# Patient Record
Sex: FEMALE | Race: WHITE | NOT HISPANIC OR LATINO
[De-identification: names, ages, dates, MRNs, and addresses within clinical notes are randomized per-mention and may not be internally consistent; named-entity substitution may affect disease eponyms.]

---

## 2017-04-25 ENCOUNTER — RECORDS - HEALTHEAST (OUTPATIENT)
Dept: ADMINISTRATIVE | Facility: OTHER | Age: 22
End: 2017-04-25

## 2017-05-11 ENCOUNTER — HOSPITAL ENCOUNTER (EMERGENCY)
Facility: CLINIC | Age: 22
Discharge: HOME | End: 2017-05-11
Attending: INTERNAL MEDICINE | Admitting: INTERNAL MEDICINE
Payer: COMMERCIAL

## 2017-05-11 VITALS
SYSTOLIC BLOOD PRESSURE: 104 MMHG | OXYGEN SATURATION: 99 % | TEMPERATURE: 98.3 F | RESPIRATION RATE: 16 BRPM | WEIGHT: 160 LBS | DIASTOLIC BLOOD PRESSURE: 64 MMHG | HEART RATE: 77 BPM

## 2017-05-11 DIAGNOSIS — O21.0 HYPEREMESIS GRAVIDARUM: ICD-10-CM

## 2017-05-11 LAB
ALBUMIN SERPL-MCNC: 3.5 G/DL (ref 3.4–5)
ALBUMIN UR-MCNC: NEGATIVE MG/DL
ALP SERPL-CCNC: 42 U/L (ref 40–150)
ALT SERPL W P-5'-P-CCNC: 18 U/L (ref 0–50)
ANION GAP SERPL CALCULATED.3IONS-SCNC: 7 MMOL/L (ref 3–14)
APPEARANCE UR: ABNORMAL
AST SERPL W P-5'-P-CCNC: 12 U/L (ref 0–45)
BACTERIA #/AREA URNS HPF: ABNORMAL /HPF
BASOPHILS # BLD AUTO: 0 10E9/L (ref 0–0.2)
BASOPHILS NFR BLD AUTO: 0.2 %
BILIRUB SERPL-MCNC: 0.4 MG/DL (ref 0.2–1.3)
BILIRUB UR QL STRIP: NEGATIVE
BUN SERPL-MCNC: 9 MG/DL (ref 7–30)
CALCIUM SERPL-MCNC: 9 MG/DL (ref 8.5–10.1)
CHLORIDE SERPL-SCNC: 105 MMOL/L (ref 94–109)
CO2 SERPL-SCNC: 25 MMOL/L (ref 20–32)
COLOR UR AUTO: YELLOW
CREAT SERPL-MCNC: 0.49 MG/DL (ref 0.52–1.04)
DIFFERENTIAL METHOD BLD: NORMAL
EOSINOPHIL # BLD AUTO: 0.1 10E9/L (ref 0–0.7)
EOSINOPHIL NFR BLD AUTO: 0.7 %
ERYTHROCYTE [DISTWIDTH] IN BLOOD BY AUTOMATED COUNT: 13 % (ref 10–15)
GFR SERPL CREATININE-BSD FRML MDRD: ABNORMAL ML/MIN/1.7M2
GLUCOSE SERPL-MCNC: 75 MG/DL (ref 70–99)
GLUCOSE UR STRIP-MCNC: NEGATIVE MG/DL
HCT VFR BLD AUTO: 37.9 % (ref 35–47)
HGB BLD-MCNC: 13.4 G/DL (ref 11.7–15.7)
HGB UR QL STRIP: NEGATIVE
HYALINE CASTS #/AREA URNS LPF: 1 /LPF (ref 0–2)
IMM GRANULOCYTES # BLD: 0 10E9/L (ref 0–0.4)
IMM GRANULOCYTES NFR BLD: 0.3 %
KETONES UR STRIP-MCNC: 20 MG/DL
LEUKOCYTE ESTERASE UR QL STRIP: NEGATIVE
LYMPHOCYTES # BLD AUTO: 1.5 10E9/L (ref 0.8–5.3)
LYMPHOCYTES NFR BLD AUTO: 16.5 %
MCH RBC QN AUTO: 32 PG (ref 26.5–33)
MCHC RBC AUTO-ENTMCNC: 35.4 G/DL (ref 31.5–36.5)
MCV RBC AUTO: 91 FL (ref 78–100)
MONOCYTES # BLD AUTO: 0.5 10E9/L (ref 0–1.3)
MONOCYTES NFR BLD AUTO: 5.2 %
MUCOUS THREADS #/AREA URNS LPF: PRESENT /LPF
NEUTROPHILS # BLD AUTO: 7 10E9/L (ref 1.6–8.3)
NEUTROPHILS NFR BLD AUTO: 77.1 %
NITRATE UR QL: NEGATIVE
NRBC # BLD AUTO: 0 10*3/UL
NRBC BLD AUTO-RTO: 0 /100
PH UR STRIP: 6 PH (ref 5–7)
PLATELET # BLD AUTO: 287 10E9/L (ref 150–450)
POTASSIUM SERPL-SCNC: 3.7 MMOL/L (ref 3.4–5.3)
PROT SERPL-MCNC: 6.9 G/DL (ref 6.8–8.8)
RBC # BLD AUTO: 4.19 10E12/L (ref 3.8–5.2)
RBC #/AREA URNS AUTO: 1 /HPF (ref 0–2)
SODIUM SERPL-SCNC: 137 MMOL/L (ref 133–144)
SP GR UR STRIP: 1.02 (ref 1–1.03)
SQUAMOUS #/AREA URNS AUTO: 1 /HPF (ref 0–1)
URN SPEC COLLECT METH UR: ABNORMAL
UROBILINOGEN UR STRIP-MCNC: 0 MG/DL (ref 0–2)
WBC # BLD AUTO: 9.1 10E9/L (ref 4–11)
WBC #/AREA URNS AUTO: 1 /HPF (ref 0–2)

## 2017-05-11 PROCEDURE — 81001 URINALYSIS AUTO W/SCOPE: CPT | Performed by: INTERNAL MEDICINE

## 2017-05-11 PROCEDURE — 85025 COMPLETE CBC W/AUTO DIFF WBC: CPT | Performed by: INTERNAL MEDICINE

## 2017-05-11 PROCEDURE — 99284 EMERGENCY DEPT VISIT MOD MDM: CPT | Mod: 25

## 2017-05-11 PROCEDURE — 80053 COMPREHEN METABOLIC PANEL: CPT | Performed by: INTERNAL MEDICINE

## 2017-05-11 PROCEDURE — 96361 HYDRATE IV INFUSION ADD-ON: CPT

## 2017-05-11 PROCEDURE — 25000128 H RX IP 250 OP 636: Performed by: INTERNAL MEDICINE

## 2017-05-11 PROCEDURE — 96374 THER/PROPH/DIAG INJ IV PUSH: CPT

## 2017-05-11 RX ORDER — PRENATAL VIT/IRON FUM/FOLIC AC 27MG-0.8MG
1 TABLET ORAL DAILY
COMMUNITY

## 2017-05-11 RX ORDER — METOCLOPRAMIDE HYDROCHLORIDE 5 MG/ML
5 INJECTION INTRAMUSCULAR; INTRAVENOUS ONCE
Status: COMPLETED | OUTPATIENT
Start: 2017-05-11 | End: 2017-05-11

## 2017-05-11 RX ORDER — SODIUM CHLORIDE 9 MG/ML
1000 INJECTION, SOLUTION INTRAVENOUS CONTINUOUS
Status: DISCONTINUED | OUTPATIENT
Start: 2017-05-11 | End: 2017-05-11 | Stop reason: HOSPADM

## 2017-05-11 RX ORDER — ONDANSETRON 4 MG/1
4 TABLET, ORALLY DISINTEGRATING ORAL EVERY 8 HOURS PRN
Qty: 10 TABLET | Refills: 0 | Status: SHIPPED | OUTPATIENT
Start: 2017-05-11 | End: 2017-05-14

## 2017-05-11 RX ADMIN — SODIUM CHLORIDE 1000 ML: 9 INJECTION, SOLUTION INTRAVENOUS at 08:39

## 2017-05-11 RX ADMIN — METOCLOPRAMIDE 5 MG: 5 INJECTION, SOLUTION INTRAMUSCULAR; INTRAVENOUS at 08:39

## 2017-05-11 ASSESSMENT — ENCOUNTER SYMPTOMS
NAUSEA: 1
DYSURIA: 0
CHILLS: 0
DIFFICULTY URINATING: 0
FEVER: 0
ABDOMINAL PAIN: 0
VOMITING: 1
DIARRHEA: 0

## 2017-05-11 NOTE — ED NOTES
Pt is 11 weeks pregnant and c/o nausea and vomiting for 24 hours. Pt unable to keep anything down. Does have reglan at home and has not taking it for a couple days.

## 2017-05-11 NOTE — DISCHARGE INSTRUCTIONS
Discharge Instructions  Hyperemesis Gravidarum    You were seen today for hyperemesis gravidarum. It is very common to have some nausea and vomiting in early pregnancy (sometimes called  morning sickness,  although it happens at all times of day.) In hyperemesis gravidarum, however, the vomiting is much more severe, so you may become dehydrated and lose weight. We have treated you today and improved your symptoms, but they usually come back. You need to follow-up with your regular doctor or obstetrician within 1-2 days.    Return to the Emergency Department if:    You feel dizzy or light headed when standing up.    You are vomiting and can t keep fluids or medications down.    You urinate less often than usual and have dark yellow urine.     You feel much more ill or develop new symptoms.    What can I do to help myself?    Be sure to drink plenty of cold clear fluids (sports drinks and ginger ale) and suck on popsicles between meals.    Eat as soon as you feel hungry, or even before you feel hungry. Try to keep something on your stomach.     Avoid strong smells, or other things that make you feel nauseated.    Snack often and eat small meals high in protein or carbohydrates such as crackers, bread, nuts, and low-fat yogurt. Avoid spicy, greasy, or acid foods.    Avoid lying down right after you eat.    Take your prenatal vitamins at bedtime with a snack instead of taking them in the morning.    Ginger may make you feel better. Try eating a small piece of ginger, or having ginger-flavored hard candies.    Acupressure wrist bands are also helpful to some people.    Treatment:    Nausea medication.  Your doctor may send you home with a prescription medicine, like Zofran  (ondansetron), Compazine  (prochlorperazine), or Reglan  (metoclopramide).       Your doctor may recommend that you take non-prescription nausea medicines, like Dramamine  (dimenhydrinate).      Vitamins. Make sure your prenatal vitamins have 400  micrograms of Folic acid and have vitamin B6, since this may help your nausea and vomiting.   If you were given a prescription for medicine here today, be sure to read all of the information (including the package insert) that comes with your prescription.  This will include important information about the medicine, its side effects, and any warnings that you need to know about.  The pharmacist who fills the prescription can provide more information and answer questions you may have about the medicine.  If you have questions or concerns that the pharmacist cannot address, please call or return to the Emergency Department.           Remember that you can always come back to the Emergency Department if you are not able to see your regular doctor in the amount of time listed above, if you get any new symptoms, or if there is anything that worries you.

## 2017-05-11 NOTE — ED PROVIDER NOTES
History     Chief Complaint:  Nausea and vomiting    HPI   Tami Malik is a 22 year old female who presents with nausea, vomiting. The patient is currently 11 weeks pregnant by date, , and has had some nausea and vomiting associated early with her pregnancy and has Reglan at home. Over the past two days, she reports that she has had persistent nausea with vomiting, totaling about 11 bouts since onset, and has been unable to tolerate much PO intake. Given her pregnant status and uncontrolled symptoms she presents here now for evaluation. She denies any recent sick contacts, strange/uncooked food. She has not had any fevers, diarrhea, significant or notable abdominal pain, or urinary symptoms.    Allergies:  No known drug allergies     Medications:    Reglan  Prenatal vitamins     Past Medical History:    The patient does not have any past pertinent medical history.     Past Surgical History:    ENT surgery     Family History:    History reviewed. No pertinent family history.      Social History:  Smoking status: Never smoker  Alcohol use: No   Marital Status:       Review of Systems   Constitutional: Negative for chills and fever.   Gastrointestinal: Positive for nausea and vomiting. Negative for abdominal pain and diarrhea.   Genitourinary: Positive for decreased urine volume. Negative for difficulty urinating, dysuria, vaginal bleeding and vaginal pain.   All other systems reviewed and are negative.      Physical Exam   First Vitals:  BP: 123/75  Pulse: 77  Temp: 98.3  F (36.8  C)  Resp: 16  Weight: 72.6 kg (160 lb)  SpO2: 98 %      Physical Exam   Constitutional: She is cooperative.   HENT:   Right Ear: Tympanic membrane normal.   Left Ear: Tympanic membrane normal.   Mouth/Throat: Oropharynx is clear and moist and mucous membranes are normal.   Eyes: Conjunctivae are normal.   Neck: Normal range of motion.   Cardiovascular: Regular rhythm and normal heart sounds.    Pulmonary/Chest: Effort normal  and breath sounds normal.   Abdominal: Soft. Normal appearance and bowel sounds are normal. There is tenderness. There is no rebound and no guarding.   Mild lower abd tenderness   Musculoskeletal: Normal range of motion.   Lymphadenopathy:     She has no cervical adenopathy.   Neurological: She is alert.   Skin: Skin is warm and dry.   Psychiatric: She has a normal mood and affect.       Emergency Department Course     Laboratory:  CBC: WNL (WBC 9.1, HGB 13.4, )    CMP: Creatinine 0.49 (L), o/w WNL   UA: Ketone 20, few bacteria, mucous present, o/w negative    Interventions:  0839 - Reglan 5 mg IV  0839 - NS 1L IV Bolus      Emergency Department Course:  Past medical records, nursing notes, and vitals reviewed.  0820: I performed an exam of the patient and obtained history, as documented above.   An IV was inserted to administer the above interventions.    1105: I rechecked the patient. Findings and plan explained to the Patient. Patient discharged home with instructions regarding supportive care, medications, and reasons to return. The importance of close follow-up was reviewed.      Impression & Plan      Medical Decision Making:  Tami Malik is a 22 year old female about 11 weeks pregnant who presents complaining of intolerable vomiting. She has been struggling with nausea but has been generally been able to control it with Reglan but today was unable to keep her medicine down. She did not have any significant tenderness in the abdomen but given her progressive symptoms this late in pregnancy, I did order laboratory tests and these are reassuring. She has small ketonuria but no evidence of urinary tract infection. After IV fluids and IV Reglan she has been able to take clear liquids without difficulty. I will discharge the patient with Zofran ODT to use as a second line agent in case she is unable to keep Reglan down. Return if again unable to tolerate oral fluids or other problems.    Diagnosis:     ICD-10-CM    1. Hyperemesis gravidarum O21.0        Discharge Medications:   Details   ondansetron (ZOFRAN ODT) 4 MG ODT tab Take 1 tablet (4 mg) by mouth every 8 hours as needed, Disp-10 tablet, R-0, Local Print          Jarad Moreira  5/11/2017   [facility] EMERGENCY DEPARTMENT  I, Jarad Moreira, lisa serving as a scribe at 8:20 AM on 5/11/2017 to document services personally performed by Luna House MD based on my observations and the provider's statements to me.       Luna House MD  05/11/17 2471

## 2017-05-11 NOTE — ED AVS SNAPSHOT
[facility] Emergency Department    [address]    [facility] [address]    Phone:  [phone]    Fax:  [phone]                                       Tami Malik   MRN: [id number]    Department:  [facility] Emergency Department   Date of Visit:  5/11/2017           After Visit Summary Signature Page     I have received my discharge instructions, and my questions have been answered. I have discussed any challenges I see with this plan with the nurse or doctor.    ..........................................................................................................................................  Patient/Patient Representative Signature      ..........................................................................................................................................  Patient Representative Print Name and Relationship to Patient    ..................................................               ................................................  Date                                            Time    ..........................................................................................................................................  Reviewed by Signature/Title    ...................................................              ..............................................  Date                                                            Time

## 2017-05-11 NOTE — ED AVS SNAPSHOT
[facility] Emergency Department    [address] [place] [address]    Phone:  [phone]    Fax:  [phone]                                       Tami Malik   MRN: [id number]    Department:  [facility] Emergency Department   Date of Visit:  5/11/2017           Patient Information     Date Of Birth          1995        Your diagnoses for this visit were:     Hyperemesis gravidarum        You were seen by Luna House MD.        Discharge Instructions       Discharge Instructions  Hyperemesis Gravidarum    You were seen today for hyperemesis gravidarum. It is very common to have some nausea and vomiting in early pregnancy (sometimes called  morning sickness,  although it happens at all times of day.) In hyperemesis gravidarum, however, the vomiting is much more severe, so you may become dehydrated and lose weight. We have treated you today and improved your symptoms, but they usually come back. You need to follow-up with your regular doctor or obstetrician within 1-2 days.    Return to the Emergency Department if:    You feel dizzy or light headed when standing up.    You are vomiting and can t keep fluids or medications down.    You urinate less often than usual and have dark yellow urine.     You feel much more ill or develop new symptoms.    What can I do to help myself?    Be sure to drink plenty of cold clear fluids (sports drinks and ginger ale) and suck on popsicles between meals.    Eat as soon as you feel hungry, or even before you feel hungry. Try to keep something on your stomach.     Avoid strong smells, or other things that make you feel nauseated.    Snack often and eat small meals high in protein or carbohydrates such as crackers, bread, nuts, and low-fat yogurt. Avoid spicy, greasy, or acid foods.    Avoid lying down right after you eat.    Take your prenatal vitamins at bedtime with a snack instead of taking them in the  morning.    Ginger may make you feel better. Try eating a small piece of ginger, or having ginger-flavored hard candies.    Acupressure wrist bands are also helpful to some people.    Treatment:    Nausea medication.  Your doctor may send you home with a prescription medicine, like Zofran  (ondansetron), Compazine  (prochlorperazine), or Reglan  (metoclopramide).       Your doctor may recommend that you take non-prescription nausea medicines, like Dramamine  (dimenhydrinate).      Vitamins. Make sure your prenatal vitamins have 400 micrograms of Folic acid and have vitamin B6, since this may help your nausea and vomiting.   If you were given a prescription for medicine here today, be sure to read all of the information (including the package insert) that comes with your prescription.  This will include important information about the medicine, its side effects, and any warnings that you need to know about.  The pharmacist who fills the prescription can provide more information and answer questions you may have about the medicine.  If you have questions or concerns that the pharmacist cannot address, please call or return to the Emergency Department.           Remember that you can always come back to the Emergency Department if you are not able to see your regular doctor in the amount of time listed above, if you get any new symptoms, or if there is anything that worries you.        24 Hour Appointment Hotline       To make an appointment at any [facility], call [phone] ([phone]). If you don't have a family doctor or clinic, we will help you find one. [facility] are conveniently located to serve the needs of you and your family.             Review of your medicines      START taking        Dose / Directions Last dose taken    ondansetron 4 MG ODT tab   Commonly known as:  ZOFRAN ODT   Dose:  4 mg   Quantity:  10 tablet        Take 1 tablet (4 mg) by mouth every 8 hours as needed   Refills:   0          Our records show that you are taking the medicines listed below. If these are incorrect, please call your family doctor or clinic.        Dose / Directions Last dose taken    prenatal multivitamin  plus iron 27-0.8 MG Tabs per tablet   Dose:  1 tablet        Take 1 tablet by mouth daily   Refills:  0        REGLAN PO        Refills:  0                Prescriptions were sent or printed at these locations (1 Prescription)                   Other Prescriptions                Printed at Department/Unit printer (1 of 1)         ondansetron (ZOFRAN ODT) 4 MG ODT tab                Procedures and tests performed during your visit     CBC with platelets differential    Comprehensive metabolic panel    UA with Microscopic      Orders Needing Specimen Collection     None      Pending Results     No orders found from 5/9/2017 to 5/12/2017.            Pending Culture Results     No orders found from 5/9/2017 to 5/12/2017.            Pending Results Instructions     If you had any lab results that were not finalized at the time of your Discharge, you can call the ED Lab Result RN at [phone]. You will be contacted by this team for any positive Lab results or changes in treatment. The nurses are available 7 days a week from 10A to 6:30P.  You can leave a message 24 hours per day and they will return your call.        Test Results From Your Hospital Stay        5/11/2017  8:51 AM      Component Results     Component Value Ref Range & Units Status    WBC 9.1 4.0 - 11.0 10e9/L Final    RBC Count 4.19 3.8 - 5.2 10e12/L Final    Hemoglobin 13.4 11.7 - 15.7 g/dL Final    Hematocrit 37.9 35.0 - 47.0 % Final    MCV 91 78 - 100 fl Final    MCH 32.0 26.5 - 33.0 pg Final    MCHC 35.4 31.5 - 36.5 g/dL Final    RDW 13.0 10.0 - 15.0 % Final    Platelet Count 287 150 - 450 10e9/L Final    Diff Method Automated Method  Final    % Neutrophils 77.1 % Final    % Lymphocytes 16.5 % Final    % Monocytes 5.2 % Final    % Eosinophils 0.7  % Final    % Basophils 0.2 % Final    % Immature Granulocytes 0.3 % Final    Nucleated RBCs 0 0 /100 Final    Absolute Neutrophil 7.0 1.6 - 8.3 10e9/L Final    Absolute Lymphocytes 1.5 0.8 - 5.3 10e9/L Final    Absolute Monocytes 0.5 0.0 - 1.3 10e9/L Final    Absolute Eosinophils 0.1 0.0 - 0.7 10e9/L Final    Absolute Basophils 0.0 0.0 - 0.2 10e9/L Final    Abs Immature Granulocytes 0.0 0 - 0.4 10e9/L Final    Absolute Nucleated RBC 0.0  Final         5/11/2017  9:08 AM      Component Results     Component Value Ref Range & Units Status    Sodium 137 133 - 144 mmol/L Final    Potassium 3.7 3.4 - 5.3 mmol/L Final    Chloride 105 94 - 109 mmol/L Final    Carbon Dioxide 25 20 - 32 mmol/L Final    Anion Gap 7 3 - 14 mmol/L Final    Glucose 75 70 - 99 mg/dL Final    Urea Nitrogen 9 7 - 30 mg/dL Final    Creatinine 0.49 (L) 0.52 - 1.04 mg/dL Final    GFR Estimate >90  Non  GFR Calc   >60 mL/min/1.7m2 Final    GFR Estimate If Black >90   GFR Calc   >60 mL/min/1.7m2 Final    Calcium 9.0 8.5 - 10.1 mg/dL Final    Bilirubin Total 0.4 0.2 - 1.3 mg/dL Final    Albumin 3.5 3.4 - 5.0 g/dL Final    Protein Total 6.9 6.8 - 8.8 g/dL Final    Alkaline Phosphatase 42 40 - 150 U/L Final    ALT 18 0 - 50 U/L Final    AST 12 0 - 45 U/L Final         5/11/2017 10:37 AM      Component Results     Component Value Ref Range & Units Status    Color Urine Yellow  Final    Appearance Urine Slightly Cloudy  Final    Glucose Urine Negative NEG mg/dL Final    Bilirubin Urine Negative NEG Final    Ketones Urine 20 (A) NEG mg/dL Final    Specific Gravity Urine 1.019 1.003 - 1.035 Final    Blood Urine Negative NEG Final    pH Urine 6.0 5.0 - 7.0 pH Final    Protein Albumin Urine Negative NEG mg/dL Final    Urobilinogen mg/dL 0.0 0.0 - 2.0 mg/dL Final    Nitrite Urine Negative NEG Final    Leukocyte Esterase Urine Negative NEG Final    Source Midstream Urine  Final    WBC Urine 1 0 - 2 /HPF Final    RBC Urine 1 0 - 2  /HPF Final    Bacteria Urine Few (A) NEG /HPF Final    Squamous Epithelial /HPF Urine 1 0 - 1 /HPF Final    Mucous Urine Present (A) NEG /LPF Final    Hyaline Casts 1 0 - 2 /LPF Final                Clinical Quality Measure: Blood Pressure Screening     Your blood pressure was checked while you were in the emergency department today. The last reading we obtained was  BP: 104/64 . Please read the guidelines below about what these numbers mean and what you should do about them.  If your systolic blood pressure (the top number) is less than 120 and your diastolic blood pressure (the bottom number) is less than 80, then your blood pressure is normal. There is nothing more that you need to do about it.  If your systolic blood pressure (the top number) is 120-139 or your diastolic blood pressure (the bottom number) is 80-89, your blood pressure may be higher than it should be. You should have your blood pressure rechecked within a year by a primary care provider.  If your systolic blood pressure (the top number) is 140 or greater or your diastolic blood pressure (the bottom number) is 90 or greater, you may have high blood pressure. High blood pressure is treatable, but if left untreated over time it can put you at risk for heart attack, stroke, or kidney failure. You should have your blood pressure rechecked by a primary care provider within the next 4 weeks.  If your provider in the emergency department today gave you specific instructions to follow-up with your doctor or provider even sooner than that, you should follow that instruction and not wait for up to 4 weeks for your follow-up visit.        Thank you for choosing [facility]       Thank you for choosing [facility] for your care. Our goal is always to provide you with excellent care. Hearing back from our patients is one way we can continue to improve our services. Please take a few minutes to complete the written survey that you may receive in the mail after you  "visit with us. Thank you!        [employer] Information     [employer] lets you send messages to your doctor, view your test results, renew your prescriptions, schedule appointments and more. To sign up, go to [email or web address] . Click on \"Log in\" on the left side of the screen, which will take you to the Welcome page. Then click on \"Sign up Now\" on the right side of the page.     You will be asked to enter the access code listed below, as well as some personal information. Please follow the directions to create your username and password.     Your access code is: [email or web address]  Expires: 2017  8:57 AM     Your access code will  in 90 days. If you need help or a new code, please call your [facility] clinic or [phone].        Care EveryWhere ID     This is your Care EveryWhere ID. This could be used by other organizations to access your [facility] medical records  [id number]        After Visit Summary       This is your record. Keep this with you and show to your community pharmacist(s) and doctor(s) at your next visit.                  "

## 2017-05-17 ENCOUNTER — HOSPITAL ENCOUNTER (EMERGENCY)
Facility: CLINIC | Age: 22
Discharge: HOME | End: 2017-05-17
Attending: INTERNAL MEDICINE | Admitting: INTERNAL MEDICINE
Payer: COMMERCIAL

## 2017-05-17 VITALS
SYSTOLIC BLOOD PRESSURE: 131 MMHG | WEIGHT: 150 LBS | TEMPERATURE: 98 F | OXYGEN SATURATION: 99 % | RESPIRATION RATE: 18 BRPM | DIASTOLIC BLOOD PRESSURE: 88 MMHG | HEART RATE: 69 BPM

## 2017-05-17 DIAGNOSIS — K59.00 CONSTIPATION, UNSPECIFIED CONSTIPATION TYPE: ICD-10-CM

## 2017-05-17 PROCEDURE — 99282 EMERGENCY DEPT VISIT SF MDM: CPT

## 2017-05-17 ASSESSMENT — ENCOUNTER SYMPTOMS
ABDOMINAL PAIN: 1
NAUSEA: 1
VOMITING: 1
FEVER: 0
CONSTIPATION: 1

## 2017-05-17 NOTE — ED AVS SNAPSHOT
[facility]    [address]    [facility] [address]    Phone:  [phone]    Fax:  [phone]                                       Tami Malik   MRN: [id number]    Department:  [facility] Emergency [facility]   Date of Visit:  5/17/2017           Patient Information     Date Of Birth          1995        Your diagnoses for this visit were:     Constipation, unspecified constipation type        You were seen by Luna House MD.        Discharge Instructions       Discharge Instructions  Constipation  Your doctor has diagnosed you with constipation. Constipation can cause severe cramping pain and your physician thinks this is the cause of your abdominal pain today.  People usually recognize that they are constipated because they have difficulty having bowel movements, are not having bowel movements frequently enough, or are not having large enough bowel movements. Sometimes, especially in children or older people, you do not recognize that you are constipated until it becomes severe. The most common cause of constipation is a combination of lack of exercise and not eating enough fruits, vegetables and whole grains. Constipation can also be a side effect of medications, such as narcotics, or may be caused by a disease of the digestive system.    Return to the Emergency Department if:    Your abdominal pain worsens or does not improve after a bowel movement.    You become very weak.    You get an oral temperature above 102oF or as directed by your doctor.    You have blood in your stools (bright red or black, tarry stools).    You keep throwing up or can t drink liquids.    Your see blood when you throw up.    Your stomach gets bloated or bigger.    You have new symptoms or anything that worries you.    What can I do to help myself?    If your doctor gave you a cathartic medication, like magnesium citrate or GoLytely  (polyethylene glycol),  you can expect to have cramps and gas pains after taking it. You can expect to have a number of bowel movements and even diarrhea in the course of clearing your bowels.  You will know your bowels have been cleaned out after you pass clear liquid. The cramps and gas should let up after you have emptied your bowels. You may want to wait until morning to take this type of medication so you aren t up in the night.     Sometimes instead of cathartics, we recommend laxatives like milk of magnesia to move your bowels more slowly, or an enema to help the bowels to move. Read and follow the package directions, or follow your physician s instructions.    Once you have become very constipated, it takes time for your bowels to return to normal and you need to be very careful to prevent becoming constipated again. Take a laxative if you don t move your bowels at least every two days.       Eat foods that have a lot of fiber. Good choices are fruits, vegetables, prune juice, apple juice and high fiber cereal. Limit dairy products such as milk and cheese, since these can make constipation worse.     Drink plenty of water and other fluids.     When you feel the need to go to the bathroom, go to the bathroom. Don t hold it.    Miralax , Metamucil , Colace , Senna or fiber supplements can be used daily.  Miralax  daily is often the best choice for children.    FOLLOW UP WITH YOUR REGULAR DOCTOR IF YOUR CONSTIPATION IS NOT IMPROVING.  Sometimes, chronic constipation requires further testing to determine the cause. If you are over 50 years old, you may need a colonoscopy if you have not had one before.   If you were given a prescription for medicine here today, be sure to read all of the information (including the package insert) that comes with your prescription.  This will include important information about the medicine, its side effects, and any warnings that you need to know about.  The pharmacist who fills the prescription can  provide more information and answer questions you may have about the medicine.  If you have questions or concerns that the pharmacist cannot address, please call or return to the Emergency Department.     Remember that you can always come back to the Emergency Department if you are not able to see your regular doctor in the amount of time listed above, if you get any new symptoms, or if there is anything that worries you.      24 Hour Appointment Hotline       To make an appointment at any [facility], call [phone] ([phone]). If you don't have a family doctor or clinic, we will help you find one. [facility] are conveniently located to serve the needs of you and your family.             Review of your medicines      Our records show that you are taking the medicines listed below. If these are incorrect, please call your family doctor or clinic.        Dose / Directions Last dose taken    prenatal multivitamin  plus iron 27-0.8 MG Tabs per tablet   Dose:  1 tablet        Take 1 tablet by mouth daily   Refills:  0        REGLAN PO        Refills:  0                Orders Needing Specimen Collection     None      Pending Results     No orders found from 5/15/2017 to 5/18/2017.            Pending Culture Results     No orders found from 5/15/2017 to 5/18/2017.            Pending Results Instructions     If you had any lab results that were not finalized at the time of your Discharge, you can call the ED Lab Result RN at [phone]. You will be contacted by this team for any positive Lab results or changes in treatment. The nurses are available 7 days a week from 10A to 6:30P.  You can leave a message 24 hours per day and they will return your call.        Test Results From Your Hospital Stay               Clinical Quality Measure: Blood Pressure Screening     Your blood pressure was checked while you were in the emergency department today. The last reading we obtained was  BP: 131/88 . Please  "read the guidelines below about what these numbers mean and what you should do about them.  If your systolic blood pressure (the top number) is less than 120 and your diastolic blood pressure (the bottom number) is less than 80, then your blood pressure is normal. There is nothing more that you need to do about it.  If your systolic blood pressure (the top number) is 120-139 or your diastolic blood pressure (the bottom number) is 80-89, your blood pressure may be higher than it should be. You should have your blood pressure rechecked within a year by a primary care provider.  If your systolic blood pressure (the top number) is 140 or greater or your diastolic blood pressure (the bottom number) is 90 or greater, you may have high blood pressure. High blood pressure is treatable, but if left untreated over time it can put you at risk for heart attack, stroke, or kidney failure. You should have your blood pressure rechecked by a primary care provider within the next 4 weeks.  If your provider in the emergency department today gave you specific instructions to follow-up with your doctor or provider even sooner than that, you should follow that instruction and not wait for up to 4 weeks for your follow-up visit.        Thank you for choosing [facility]       Thank you for choosing [facility] for your care. Our goal is always to provide you with excellent care. Hearing back from our patients is one way we can continue to improve our services. Please take a few minutes to complete the written survey that you may receive in the mail after you visit with us. Thank you!        [employer] Information     [employer] lets you send messages to your doctor, view your test results, renew your prescriptions, schedule appointments and more. To sign up, go to [email or web address] . Click on \"Log in\" on the left side of the screen, which will take you to the Welcome page. Then click on \"Sign up Now\" on the right side of the page.     You " will be asked to enter the access code listed below, as well as some personal information. Please follow the directions to create your username and password.     Your access code is: [email or web address]  Expires: 2017  8:57 AM     Your access code will  in 90 days. If you need help or a new code, please call your [facility] clinic or [phone].        Care EveryWhere ID     This is your Care EveryWhere ID. This could be used by other organizations to access your [facility] medical records  [id number]        After Visit Summary       This is your record. Keep this with you and show to your community pharmacist(s) and doctor(s) at your next visit.

## 2017-05-17 NOTE — DISCHARGE INSTRUCTIONS
Discharge Instructions  Constipation  Your doctor has diagnosed you with constipation. Constipation can cause severe cramping pain and your physician thinks this is the cause of your abdominal pain today.  People usually recognize that they are constipated because they have difficulty having bowel movements, are not having bowel movements frequently enough, or are not having large enough bowel movements. Sometimes, especially in children or older people, you do not recognize that you are constipated until it becomes severe. The most common cause of constipation is a combination of lack of exercise and not eating enough fruits, vegetables and whole grains. Constipation can also be a side effect of medications, such as narcotics, or may be caused by a disease of the digestive system.    Return to the Emergency Department if:    Your abdominal pain worsens or does not improve after a bowel movement.    You become very weak.    You get an oral temperature above 102oF or as directed by your doctor.    You have blood in your stools (bright red or black, tarry stools).    You keep throwing up or can t drink liquids.    Your see blood when you throw up.    Your stomach gets bloated or bigger.    You have new symptoms or anything that worries you.    What can I do to help myself?    If your doctor gave you a cathartic medication, like magnesium citrate or GoLytely  (polyethylene glycol), you can expect to have cramps and gas pains after taking it. You can expect to have a number of bowel movements and even diarrhea in the course of clearing your bowels.  You will know your bowels have been cleaned out after you pass clear liquid. The cramps and gas should let up after you have emptied your bowels. You may want to wait until morning to take this type of medication so you aren t up in the night.     Sometimes instead of cathartics, we recommend laxatives like milk of magnesia to move your bowels more slowly, or an enema to  help the bowels to move. Read and follow the package directions, or follow your physician s instructions.    Once you have become very constipated, it takes time for your bowels to return to normal and you need to be very careful to prevent becoming constipated again. Take a laxative if you don t move your bowels at least every two days.       Eat foods that have a lot of fiber. Good choices are fruits, vegetables, prune juice, apple juice and high fiber cereal. Limit dairy products such as milk and cheese, since these can make constipation worse.     Drink plenty of water and other fluids.     When you feel the need to go to the bathroom, go to the bathroom. Don t hold it.    Miralax , Metamucil , Colace , Senna or fiber supplements can be used daily.  Miralax  daily is often the best choice for children.    FOLLOW UP WITH YOUR REGULAR DOCTOR IF YOUR CONSTIPATION IS NOT IMPROVING.  Sometimes, chronic constipation requires further testing to determine the cause. If you are over 50 years old, you may need a colonoscopy if you have not had one before.   If you were given a prescription for medicine here today, be sure to read all of the information (including the package insert) that comes with your prescription.  This will include important information about the medicine, its side effects, and any warnings that you need to know about.  The pharmacist who fills the prescription can provide more information and answer questions you may have about the medicine.  If you have questions or concerns that the pharmacist cannot address, please call or return to the Emergency Department.     Remember that you can always come back to the Emergency Department if you are not able to see your regular doctor in the amount of time listed above, if you get any new symptoms, or if there is anything that worries you.

## 2017-05-17 NOTE — ED AVS SNAPSHOT
[facility] Emergency Department    [address]    [facility] [address]    Phone:  [phone]    Fax:  [phone]                                       Tami Malik   MRN: [id number]    Department:  [facility] Emergency Department   Date of Visit:  5/17/2017           After Visit Summary Signature Page     I have received my discharge instructions, and my questions have been answered. I have discussed any challenges I see with this plan with the nurse or doctor.    ..........................................................................................................................................  Patient/Patient Representative Signature      ..........................................................................................................................................  Patient Representative Print Name and Relationship to Patient    ..................................................               ................................................  Date                                            Time    ..........................................................................................................................................  Reviewed by Signature/Title    ...................................................              ..............................................  Date                                                            Time

## 2017-05-17 NOTE — ED PROVIDER NOTES
History     Chief Complaint:  Abdominal Pain      HPI   Tami Malik is a 22 year old female  female approximately 12 weeks pregnant who presents with acute onset of cramping abdominal pain and vomiting at 0230 this morning.  Per chart review, the patient presented on  for evaluation of nausea and vomiting which improved after IV Reglan in the ED and she was sent home with Zofran.  She reports since that visit her nausea and vomiting have improved, though she notes her last bowel movement was about one week ago.  The patient reports yesterday night, , she realized she had not had a bowel movement in a week and therefore tried taking some Senna.  She notes she felt generally uncomfortable prior to going to bed but did not have any severe pain.  She states she woke up at 0230 this morning with severe abdominal pain and vomiting and therefore presents to the ED.  The patient reports her current pain feels like gas pain.  She denies history of constipation in the past.  She denies fevers.  The patient states she has had US for the current pregnancy showing viable IUP.    Allergies:  NKDA     Medications:    Reglan  Prenatal Vitamin    Past Medical History:    History reviewed.  No significant past medical history.     Past Surgical History:    ENT Surgery     Family History:  History reviewed.  No significant family history.     Social History:  Relationship status:   The patient denies smoking.   The patient denies alcohol use.   The patient presents with her .     Review of Systems   Constitutional: Negative for fever.   Gastrointestinal: Positive for abdominal pain, constipation, nausea and vomiting.   All other systems reviewed and are negative.      Physical Exam   First Vitals:  BP: 131/88  Pulse: 69  Heart Rate: 69  Temp: 98  F (36.7  C)  Resp: 18  Weight: 68 kg (150 lb)  SpO2: 99 %      Physical Exam   Constitutional: She is cooperative.   Cardiovascular: Regular rhythm and normal  heart sounds.    Pulmonary/Chest: Effort normal and breath sounds normal.   Abdominal: There is tenderness. There is no rebound and no guarding.   Mild lower abdominal tenderness   Neurological: She is alert.       Emergency Department Course     Interventions:  Fleet enema, 1 enema, rectally given    ED Course:  Nursing notes and past medical history reviewed.   I performed a physical examination of the patient as documented above.  I explained the plan with the patient who consents to this.   The patient underwent the workup as described above.   0416 Recheck.  Patient reports large bowel movement and resolution of her abdominal pain.     Findings and plan explained to the Patient. Patient discharged home with instructions regarding supportive care, medications, and reasons to return. The importance of close follow-up was reviewed.     Impression & Plan      Medical Decision Making:  Tami Malik is a 22 year old female in early pregnancy who presents to the emergency department today complaining of marked cramping abdominal pain and constipation.  Her abdominal exam did not show any findings suggestive of a surgical process and with her compelling history I proceeded to try an enema.  With this she had good results and prompt relief of her abdominal pain.  I do not think further testing is required.  I have discharged the patient with constipation instructions, return if recurrent symptoms or other problems.     Diagnosis:    ICD-10-CM   1. Constipation, unspecified constipation type K59.00         Disposition:   Discharge to home with primary care follow up.       Leonardo FRIAS, am serving as a scribe on 5/17/2017 at 3:50 AM to personally document services performed by Luna House MD, based on my observations and the provider's statements to me.       Luna House MD  05/17/17 0519

## 2017-06-26 ENCOUNTER — COMMUNICATION - HEALTHEAST (OUTPATIENT)
Dept: ADMINISTRATIVE | Facility: CLINIC | Age: 22
End: 2017-06-26

## 2017-07-07 ENCOUNTER — RECORDS - HEALTHEAST (OUTPATIENT)
Dept: ADMINISTRATIVE | Facility: OTHER | Age: 22
End: 2017-07-07

## 2017-07-26 ENCOUNTER — PRENATAL OFFICE VISIT - HEALTHEAST (OUTPATIENT)
Dept: MIDWIFE SERVICES | Facility: CLINIC | Age: 22
End: 2017-07-26

## 2017-07-26 ENCOUNTER — HOSPITAL ENCOUNTER (OUTPATIENT)
Dept: ULTRASOUND IMAGING | Facility: CLINIC | Age: 22
Discharge: HOME | End: 2017-07-26
Attending: ADVANCED PRACTICE MIDWIFE

## 2017-07-26 DIAGNOSIS — Z34.02 ENCOUNTER FOR SUPERVISION OF NORMAL FIRST PREGNANCY IN SECOND TRIMESTER: ICD-10-CM

## 2017-07-26 ASSESSMENT — MIFFLIN-ST. JEOR: SCORE: 1454.59

## 2017-07-27 ENCOUNTER — AMBULATORY - HEALTHEAST (OUTPATIENT)
Dept: OBGYN | Facility: CLINIC | Age: 22
End: 2017-07-27

## 2017-07-27 ENCOUNTER — RECORDS - HEALTHEAST (OUTPATIENT)
Dept: ADMINISTRATIVE | Facility: OTHER | Age: 22
End: 2017-07-27

## 2017-07-31 ENCOUNTER — COMMUNICATION - HEALTHEAST (OUTPATIENT)
Dept: MIDWIFE SERVICES | Facility: CLINIC | Age: 22
End: 2017-07-31

## 2017-08-23 ENCOUNTER — COMMUNICATION - HEALTHEAST (OUTPATIENT)
Dept: MIDWIFE SERVICES | Facility: CLINIC | Age: 22
End: 2017-08-23

## 2017-08-23 ENCOUNTER — PRENATAL OFFICE VISIT - HEALTHEAST (OUTPATIENT)
Dept: MIDWIFE SERVICES | Facility: CLINIC | Age: 22
End: 2017-08-23

## 2017-08-23 DIAGNOSIS — Z34.02 ENCOUNTER FOR SUPERVISION OF NORMAL FIRST PREGNANCY IN SECOND TRIMESTER: ICD-10-CM

## 2017-08-23 ASSESSMENT — MIFFLIN-ST. JEOR: SCORE: 1491.79

## 2017-08-24 ENCOUNTER — COMMUNICATION - HEALTHEAST (OUTPATIENT)
Dept: MIDWIFE SERVICES | Facility: CLINIC | Age: 22
End: 2017-08-24

## 2017-08-24 LAB
HCV AB SERPL QL IA: NEGATIVE
SYPHILIS RPR SCREEN - HISTORICAL: NORMAL

## 2017-09-20 ENCOUNTER — PRENATAL OFFICE VISIT - HEALTHEAST (OUTPATIENT)
Dept: MIDWIFE SERVICES | Facility: CLINIC | Age: 22
End: 2017-09-20

## 2017-09-20 DIAGNOSIS — Z34.02 ENCOUNTER FOR SUPERVISION OF NORMAL FIRST PREGNANCY IN SECOND TRIMESTER: ICD-10-CM

## 2017-09-20 ASSESSMENT — MIFFLIN-ST. JEOR: SCORE: 1534.88

## 2017-10-04 ENCOUNTER — PRENATAL OFFICE VISIT - HEALTHEAST (OUTPATIENT)
Dept: MIDWIFE SERVICES | Facility: CLINIC | Age: 22
End: 2017-10-04

## 2017-10-04 DIAGNOSIS — Z34.02 ENCOUNTER FOR SUPERVISION OF NORMAL FIRST PREGNANCY IN SECOND TRIMESTER: ICD-10-CM

## 2017-10-04 ASSESSMENT — MIFFLIN-ST. JEOR: SCORE: 1580.69

## 2017-10-18 ENCOUNTER — PRENATAL OFFICE VISIT - HEALTHEAST (OUTPATIENT)
Dept: MIDWIFE SERVICES | Facility: CLINIC | Age: 22
End: 2017-10-18

## 2017-10-18 DIAGNOSIS — O99.891 BACK PAIN AFFECTING PREGNANCY IN THIRD TRIMESTER: ICD-10-CM

## 2017-10-18 DIAGNOSIS — O12.03 SWELLING OF LOWER EXTREMITY DURING PREGNANCY IN THIRD TRIMESTER: ICD-10-CM

## 2017-10-18 DIAGNOSIS — M54.9 BACK PAIN AFFECTING PREGNANCY IN THIRD TRIMESTER: ICD-10-CM

## 2017-10-18 DIAGNOSIS — Z34.02 ENCOUNTER FOR SUPERVISION OF NORMAL FIRST PREGNANCY IN SECOND TRIMESTER: ICD-10-CM

## 2017-10-18 DIAGNOSIS — O46.90: ICD-10-CM

## 2017-10-18 ASSESSMENT — MIFFLIN-ST. JEOR: SCORE: 1611.99

## 2017-11-01 ENCOUNTER — PRENATAL OFFICE VISIT - HEALTHEAST (OUTPATIENT)
Dept: MIDWIFE SERVICES | Facility: CLINIC | Age: 22
End: 2017-11-01

## 2017-11-01 DIAGNOSIS — Z34.03 ENCOUNTER FOR SUPERVISION OF NORMAL FIRST PREGNANCY IN THIRD TRIMESTER: ICD-10-CM

## 2017-11-01 DIAGNOSIS — Z34.02 ENCOUNTER FOR SUPERVISION OF NORMAL FIRST PREGNANCY IN SECOND TRIMESTER: ICD-10-CM

## 2017-11-01 ASSESSMENT — MIFFLIN-ST. JEOR: SCORE: 1631.95

## 2017-11-08 ENCOUNTER — PRENATAL OFFICE VISIT - HEALTHEAST (OUTPATIENT)
Dept: MIDWIFE SERVICES | Facility: CLINIC | Age: 22
End: 2017-11-08

## 2017-11-08 DIAGNOSIS — Z34.02 ENCOUNTER FOR SUPERVISION OF NORMAL FIRST PREGNANCY IN SECOND TRIMESTER: ICD-10-CM

## 2017-11-08 ASSESSMENT — MIFFLIN-ST. JEOR: SCORE: 1640.57

## 2017-11-09 ENCOUNTER — AMBULATORY - HEALTHEAST (OUTPATIENT)
Dept: MIDWIFE SERVICES | Facility: CLINIC | Age: 22
End: 2017-11-09

## 2017-11-13 ENCOUNTER — PRENATAL OFFICE VISIT - HEALTHEAST (OUTPATIENT)
Dept: MIDWIFE SERVICES | Facility: CLINIC | Age: 22
End: 2017-11-13

## 2017-11-13 ENCOUNTER — COMMUNICATION - HEALTHEAST (OUTPATIENT)
Dept: TELEHEALTH | Facility: CLINIC | Age: 22
End: 2017-11-13

## 2017-11-13 ENCOUNTER — COMMUNICATION - HEALTHEAST (OUTPATIENT)
Dept: OBGYN | Facility: CLINIC | Age: 22
End: 2017-11-13

## 2017-11-13 DIAGNOSIS — Z34.03 ENCOUNTER FOR SUPERVISION OF NORMAL FIRST PREGNANCY IN THIRD TRIMESTER: ICD-10-CM

## 2017-11-13 DIAGNOSIS — O16.3 ELEVATED BLOOD PRESSURE AFFECTING PREGNANCY IN THIRD TRIMESTER, ANTEPARTUM: ICD-10-CM

## 2017-11-13 LAB
ALT SERPL W P-5'-P-CCNC: 15 U/L (ref 0–45)
AST SERPL W P-5'-P-CCNC: 19 U/L (ref 0–40)
CREAT SERPL-MCNC: 0.57 MG/DL (ref 0.6–1.1)
GFR SERPL CREATININE-BSD FRML MDRD: >60 ML/MIN/1.73M2

## 2017-11-13 ASSESSMENT — MIFFLIN-ST. JEOR: SCORE: 1652.81

## 2017-11-14 ENCOUNTER — COMMUNICATION - HEALTHEAST (OUTPATIENT)
Dept: OBGYN | Facility: CLINIC | Age: 22
End: 2017-11-14

## 2017-11-14 DIAGNOSIS — O13.9 GESTATIONAL HYPERTENSION: ICD-10-CM

## 2017-11-15 ENCOUNTER — AMBULATORY - HEALTHEAST (OUTPATIENT)
Dept: LAB | Facility: CLINIC | Age: 22
End: 2017-11-15

## 2017-11-15 ENCOUNTER — HOSPITAL ENCOUNTER (OUTPATIENT)
Dept: MEDSURG UNIT | Facility: CLINIC | Age: 22
Discharge: HOME | End: 2017-11-16
Attending: ADVANCED PRACTICE MIDWIFE | Admitting: ADVANCED PRACTICE MIDWIFE

## 2017-11-15 ENCOUNTER — PRENATAL OFFICE VISIT - HEALTHEAST (OUTPATIENT)
Dept: MIDWIFE SERVICES | Facility: CLINIC | Age: 22
End: 2017-11-15

## 2017-11-15 ENCOUNTER — HOSPITAL ENCOUNTER (OUTPATIENT)
Dept: ULTRASOUND IMAGING | Facility: CLINIC | Age: 22
Discharge: HOME | End: 2017-11-15
Attending: MIDWIFE

## 2017-11-15 DIAGNOSIS — O16.3 HYPERTENSION AFFECTING PREGNANCY IN THIRD TRIMESTER: ICD-10-CM

## 2017-11-15 DIAGNOSIS — O13.9 GESTATIONAL HYPERTENSION: ICD-10-CM

## 2017-11-15 DIAGNOSIS — Z34.03 ENCOUNTER FOR SUPERVISION OF NORMAL FIRST PREGNANCY IN THIRD TRIMESTER: ICD-10-CM

## 2017-11-15 LAB
ALT SERPL W P-5'-P-CCNC: 16 U/L (ref 0–45)
AST SERPL W P-5'-P-CCNC: 17 U/L (ref 0–40)
CREAT SERPL-MCNC: 0.6 MG/DL (ref 0.6–1.1)
GFR SERPL CREATININE-BSD FRML MDRD: >60 ML/MIN/1.73M2

## 2017-11-15 ASSESSMENT — MIFFLIN-ST. JEOR: SCORE: 1648.73

## 2017-11-20 ENCOUNTER — COMMUNICATION - HEALTHEAST (OUTPATIENT)
Dept: OBGYN | Facility: CLINIC | Age: 22
End: 2017-11-20

## 2017-11-22 ENCOUNTER — OFFICE VISIT - HEALTHEAST (OUTPATIENT)
Dept: MIDWIFE SERVICES | Facility: CLINIC | Age: 22
End: 2017-11-22

## 2017-11-22 DIAGNOSIS — O14.90 PREECLAMPSIA: ICD-10-CM

## 2017-11-22 ASSESSMENT — MIFFLIN-ST. JEOR: SCORE: 1594.3

## 2017-12-12 ENCOUNTER — COMMUNICATION - HEALTHEAST (OUTPATIENT)
Dept: MIDWIFE SERVICES | Facility: CLINIC | Age: 22
End: 2017-12-12

## 2017-12-20 ENCOUNTER — OFFICE VISIT - HEALTHEAST (OUTPATIENT)
Dept: MIDWIFE SERVICES | Facility: CLINIC | Age: 22
End: 2017-12-20

## 2017-12-20 ASSESSMENT — MIFFLIN-ST. JEOR: SCORE: 1563.91

## 2018-03-14 ENCOUNTER — OFFICE VISIT - HEALTHEAST (OUTPATIENT)
Dept: MIDWIFE SERVICES | Facility: CLINIC | Age: 23
End: 2018-03-14

## 2018-03-14 DIAGNOSIS — Z30.42 DEPO-PROVERA CONTRACEPTIVE STATUS: ICD-10-CM

## 2018-05-08 ENCOUNTER — OFFICE VISIT - HEALTHEAST (OUTPATIENT)
Dept: FAMILY MEDICINE | Facility: CLINIC | Age: 23
End: 2018-05-08

## 2018-05-08 DIAGNOSIS — Z00.00 ROUTINE HEALTH MAINTENANCE: ICD-10-CM

## 2018-05-08 DIAGNOSIS — Z12.4 SCREENING FOR MALIGNANT NEOPLASM OF CERVIX: ICD-10-CM

## 2018-05-08 DIAGNOSIS — R45.86 MOOD SWING: ICD-10-CM

## 2018-05-08 ASSESSMENT — MIFFLIN-ST. JEOR: SCORE: 1543.04

## 2018-05-11 LAB
BKR LAB AP ABNORMAL BLEEDING: NO
BKR LAB AP BIRTH CONTROL/HORMONES: ABNORMAL
BKR LAB AP CERVICAL APPEARANCE: ABNORMAL
BKR LAB AP GYN ADEQUACY: ABNORMAL
BKR LAB AP GYN INTERPRETATION: ABNORMAL
BKR LAB AP HPV REFLEX: ABNORMAL
BKR LAB AP LMP: ABNORMAL
BKR LAB AP PATIENT STATUS: ABNORMAL
BKR LAB AP PREVIOUS ABNORMAL: ABNORMAL
BKR LAB AP PREVIOUS NORMAL: ABNORMAL
HIGH RISK?: NO
HPV SOURCE: ABNORMAL
HUMAN PAPILLOMA VIRUS 16 DNA: NEGATIVE
HUMAN PAPILLOMA VIRUS 18 DNA: NEGATIVE
HUMAN PAPILLOMA VIRUS FINAL DIAGNOSIS: ABNORMAL
HUMAN PAPILLOMA VIRUS OTHER HR: POSITIVE
INTERPRETING LAB: ABNORMAL
PATH REPORT.COMMENTS IMP SPEC: ABNORMAL
RESULT FLAG (HE HISTORICAL CONVERSION): ABNORMAL
SPECIMEN DESCRIPTION: ABNORMAL

## 2018-05-15 ENCOUNTER — AMBULATORY - HEALTHEAST (OUTPATIENT)
Dept: FAMILY MEDICINE | Facility: CLINIC | Age: 23
End: 2018-05-15

## 2018-05-15 DIAGNOSIS — R87.610 ASCUS OF CERVIX WITH NEGATIVE HIGH RISK HPV: ICD-10-CM

## 2018-05-24 ENCOUNTER — RECORDS - HEALTHEAST (OUTPATIENT)
Dept: ADMINISTRATIVE | Facility: OTHER | Age: 23
End: 2018-05-24

## 2018-06-12 ENCOUNTER — OFFICE VISIT - HEALTHEAST (OUTPATIENT)
Dept: FAMILY MEDICINE | Facility: CLINIC | Age: 23
End: 2018-06-12

## 2018-06-12 DIAGNOSIS — R45.86 MOOD SWING: ICD-10-CM

## 2018-10-08 ENCOUNTER — OFFICE VISIT - HEALTHEAST (OUTPATIENT)
Dept: FAMILY MEDICINE | Facility: CLINIC | Age: 23
End: 2018-10-08

## 2018-10-08 DIAGNOSIS — J02.9 SORE THROAT: ICD-10-CM

## 2018-10-08 DIAGNOSIS — J02.9 VIRAL PHARYNGITIS: ICD-10-CM

## 2018-10-08 LAB — DEPRECATED S PYO AG THROAT QL EIA: NORMAL

## 2018-10-09 LAB — GROUP A STREP BY PCR: NORMAL

## 2018-10-25 ENCOUNTER — COMMUNICATION - HEALTHEAST (OUTPATIENT)
Dept: FAMILY MEDICINE | Facility: CLINIC | Age: 23
End: 2018-10-25

## 2018-12-04 ENCOUNTER — COMMUNICATION - HEALTHEAST (OUTPATIENT)
Dept: TELEHEALTH | Facility: CLINIC | Age: 23
End: 2018-12-04

## 2018-12-04 ENCOUNTER — COMMUNICATION - HEALTHEAST (OUTPATIENT)
Dept: HEALTH INFORMATION MANAGEMENT | Facility: CLINIC | Age: 23
End: 2018-12-04

## 2019-01-23 ENCOUNTER — AMBULATORY - HEALTHEAST (OUTPATIENT)
Dept: OBGYN | Facility: CLINIC | Age: 24
End: 2019-01-23

## 2019-01-23 ENCOUNTER — COMMUNICATION - HEALTHEAST (OUTPATIENT)
Dept: MIDWIFE SERVICES | Facility: CLINIC | Age: 24
End: 2019-01-23

## 2019-01-23 ENCOUNTER — HOSPITAL ENCOUNTER (OUTPATIENT)
Dept: ULTRASOUND IMAGING | Facility: CLINIC | Age: 24
Discharge: HOME | End: 2019-01-23
Attending: ADVANCED PRACTICE MIDWIFE

## 2019-01-23 ENCOUNTER — PRENATAL OFFICE VISIT - HEALTHEAST (OUTPATIENT)
Dept: MIDWIFE SERVICES | Facility: CLINIC | Age: 24
End: 2019-01-23

## 2019-01-23 DIAGNOSIS — Z34.81 ENCOUNTER FOR SUPERVISION OF OTHER NORMAL PREGNANCY IN FIRST TRIMESTER: ICD-10-CM

## 2019-01-23 DIAGNOSIS — Z32.00 POSSIBLE PREGNANCY, NOT CONFIRMED: ICD-10-CM

## 2019-01-23 DIAGNOSIS — R87.620 ASCUS WITH POSITIVE HIGH RISK HUMAN PAPILLOMAVIRUS OF VAGINA: ICD-10-CM

## 2019-01-23 DIAGNOSIS — R87.811 ASCUS WITH POSITIVE HIGH RISK HUMAN PAPILLOMAVIRUS OF VAGINA: ICD-10-CM

## 2019-01-23 DIAGNOSIS — O09.291 HX OF PREECLAMPSIA, PRIOR PREGNANCY, CURRENTLY PREGNANT, FIRST TRIMESTER: ICD-10-CM

## 2019-01-23 DIAGNOSIS — Z87.59 HISTORY OF POSTPARTUM HEMORRHAGE: ICD-10-CM

## 2019-01-23 LAB
BASOPHILS # BLD AUTO: 0 THOU/UL (ref 0–0.2)
BASOPHILS NFR BLD AUTO: 0 % (ref 0–2)
EOSINOPHIL # BLD AUTO: 0.1 THOU/UL (ref 0–0.4)
EOSINOPHIL NFR BLD AUTO: 1 % (ref 0–6)
ERYTHROCYTE [DISTWIDTH] IN BLOOD BY AUTOMATED COUNT: 13.2 % (ref 11–14.5)
HCT VFR BLD AUTO: 39.1 % (ref 35–47)
HGB BLD-MCNC: 13.4 G/DL (ref 12–16)
HIV 1+2 AB+HIV1 P24 AG SERPL QL IA: NEGATIVE
LYMPHOCYTES # BLD AUTO: 2.1 THOU/UL (ref 0.8–4.4)
LYMPHOCYTES NFR BLD AUTO: 22 % (ref 20–40)
MCH RBC QN AUTO: 30 PG (ref 27–34)
MCHC RBC AUTO-ENTMCNC: 34.3 G/DL (ref 32–36)
MCV RBC AUTO: 88 FL (ref 80–100)
MONOCYTES # BLD AUTO: 0.6 THOU/UL (ref 0–0.9)
MONOCYTES NFR BLD AUTO: 6 % (ref 2–10)
NEUTROPHILS # BLD AUTO: 6.8 THOU/UL (ref 2–7.7)
NEUTROPHILS NFR BLD AUTO: 72 % (ref 50–70)
PLATELET # BLD AUTO: 315 THOU/UL (ref 140–440)
PMV BLD AUTO: 10.1 FL (ref 8.5–12.5)
RBC # BLD AUTO: 4.46 MILL/UL (ref 3.8–5.4)
WBC: 9.5 THOU/UL (ref 4–11)

## 2019-01-23 ASSESSMENT — MIFFLIN-ST. JEOR: SCORE: 1508.12

## 2019-01-24 LAB
ABO/RH(D): NORMAL
ABORH REPEAT: NORMAL
ANTIBODY SCREEN: NEGATIVE
HBV SURFACE AG SERPL QL IA: NEGATIVE
HCV AB SERPL QL IA: NEGATIVE
RUBV IGG SERPL QL IA: POSITIVE
T PALLIDUM AB SER QL: NEGATIVE

## 2019-02-20 ENCOUNTER — PRENATAL OFFICE VISIT - HEALTHEAST (OUTPATIENT)
Dept: MIDWIFE SERVICES | Facility: CLINIC | Age: 24
End: 2019-02-20

## 2019-02-20 DIAGNOSIS — O09.291 HX OF PREECLAMPSIA, PRIOR PREGNANCY, CURRENTLY PREGNANT, FIRST TRIMESTER: ICD-10-CM

## 2019-02-20 DIAGNOSIS — Z34.80 SUPERVISION OF OTHER NORMAL PREGNANCY, ANTEPARTUM: ICD-10-CM

## 2019-02-20 DIAGNOSIS — Z34.82 ENCOUNTER FOR SUPERVISION OF OTHER NORMAL PREGNANCY IN SECOND TRIMESTER: ICD-10-CM

## 2019-02-20 ASSESSMENT — MIFFLIN-ST. JEOR: SCORE: 1521.27

## 2019-02-21 LAB — BACTERIA SPEC CULT: NO GROWTH

## 2019-03-20 ENCOUNTER — AMBULATORY - HEALTHEAST (OUTPATIENT)
Dept: OBGYN | Facility: CLINIC | Age: 24
End: 2019-03-20

## 2019-03-20 ENCOUNTER — HOSPITAL ENCOUNTER (OUTPATIENT)
Dept: ULTRASOUND IMAGING | Facility: CLINIC | Age: 24
Discharge: HOME | End: 2019-03-20
Attending: ADVANCED PRACTICE MIDWIFE

## 2019-03-20 ENCOUNTER — PRENATAL OFFICE VISIT - HEALTHEAST (OUTPATIENT)
Dept: MIDWIFE SERVICES | Facility: CLINIC | Age: 24
End: 2019-03-20

## 2019-03-20 DIAGNOSIS — O21.9 NAUSEA/VOMITING IN PREGNANCY: ICD-10-CM

## 2019-03-20 DIAGNOSIS — Z34.80 SUPERVISION OF OTHER NORMAL PREGNANCY, ANTEPARTUM: ICD-10-CM

## 2019-03-20 DIAGNOSIS — Z34.82 ENCOUNTER FOR SUPERVISION OF OTHER NORMAL PREGNANCY IN SECOND TRIMESTER: ICD-10-CM

## 2019-03-20 ASSESSMENT — MIFFLIN-ST. JEOR: SCORE: 1523.09

## 2019-04-08 ENCOUNTER — HOSPITAL ENCOUNTER (OUTPATIENT)
Dept: ULTRASOUND IMAGING | Facility: CLINIC | Age: 24
Discharge: HOME | End: 2019-04-08
Attending: ADVANCED PRACTICE MIDWIFE

## 2019-04-09 ENCOUNTER — AMBULATORY - HEALTHEAST (OUTPATIENT)
Dept: MIDWIFE SERVICES | Facility: CLINIC | Age: 24
End: 2019-04-09

## 2019-04-17 ENCOUNTER — PRENATAL OFFICE VISIT - HEALTHEAST (OUTPATIENT)
Dept: MIDWIFE SERVICES | Facility: CLINIC | Age: 24
End: 2019-04-17

## 2019-04-17 ENCOUNTER — COMMUNICATION - HEALTHEAST (OUTPATIENT)
Dept: MIDWIFE SERVICES | Facility: CLINIC | Age: 24
End: 2019-04-17

## 2019-04-17 DIAGNOSIS — O09.291 HX OF PREECLAMPSIA, PRIOR PREGNANCY, CURRENTLY PREGNANT, FIRST TRIMESTER: ICD-10-CM

## 2019-04-17 DIAGNOSIS — Z34.80 SUPERVISION OF OTHER NORMAL PREGNANCY, ANTEPARTUM: ICD-10-CM

## 2019-04-17 ASSESSMENT — MIFFLIN-ST. JEOR: SCORE: 1568.9

## 2019-05-15 ENCOUNTER — PRENATAL OFFICE VISIT - HEALTHEAST (OUTPATIENT)
Dept: MIDWIFE SERVICES | Facility: CLINIC | Age: 24
End: 2019-05-15

## 2019-05-15 DIAGNOSIS — Z34.80 SUPERVISION OF OTHER NORMAL PREGNANCY, ANTEPARTUM: ICD-10-CM

## 2019-05-15 DIAGNOSIS — Z34.82 ENCOUNTER FOR SUPERVISION OF OTHER NORMAL PREGNANCY IN SECOND TRIMESTER: ICD-10-CM

## 2019-05-15 DIAGNOSIS — O09.291 HX OF PREECLAMPSIA, PRIOR PREGNANCY, CURRENTLY PREGNANT, FIRST TRIMESTER: ICD-10-CM

## 2019-05-15 LAB
FASTING STATUS PATIENT QL REPORTED: NO
GLUCOSE 1H P 50 G GLC PO SERPL-MCNC: 83 MG/DL (ref 70–139)
HGB BLD-MCNC: 11.6 G/DL (ref 12–16)

## 2019-05-15 ASSESSMENT — MIFFLIN-ST. JEOR: SCORE: 1590.67

## 2019-05-16 LAB — T PALLIDUM AB SER QL: NEGATIVE

## 2019-06-12 ENCOUNTER — PRENATAL OFFICE VISIT - HEALTHEAST (OUTPATIENT)
Dept: MIDWIFE SERVICES | Facility: CLINIC | Age: 24
End: 2019-06-12

## 2019-06-12 DIAGNOSIS — O09.291 HX OF PREECLAMPSIA, PRIOR PREGNANCY, CURRENTLY PREGNANT, FIRST TRIMESTER: ICD-10-CM

## 2019-06-12 DIAGNOSIS — Z34.80 SUPERVISION OF OTHER NORMAL PREGNANCY, ANTEPARTUM: ICD-10-CM

## 2019-06-12 ASSESSMENT — MIFFLIN-ST. JEOR: SCORE: 1629.68

## 2019-06-26 ENCOUNTER — PRENATAL OFFICE VISIT - HEALTHEAST (OUTPATIENT)
Dept: MIDWIFE SERVICES | Facility: CLINIC | Age: 24
End: 2019-06-26

## 2019-06-26 DIAGNOSIS — Z34.80 SUPERVISION OF OTHER NORMAL PREGNANCY, ANTEPARTUM: ICD-10-CM

## 2019-06-26 DIAGNOSIS — O09.291 HX OF PREECLAMPSIA, PRIOR PREGNANCY, CURRENTLY PREGNANT, FIRST TRIMESTER: ICD-10-CM

## 2019-06-26 ASSESSMENT — MIFFLIN-ST. JEOR: SCORE: 1670.96

## 2019-07-10 ENCOUNTER — PRENATAL OFFICE VISIT - HEALTHEAST (OUTPATIENT)
Dept: MIDWIFE SERVICES | Facility: CLINIC | Age: 24
End: 2019-07-10

## 2019-07-10 DIAGNOSIS — Z34.80 SUPERVISION OF OTHER NORMAL PREGNANCY, ANTEPARTUM: ICD-10-CM

## 2019-07-10 DIAGNOSIS — O09.291 HX OF PREECLAMPSIA, PRIOR PREGNANCY, CURRENTLY PREGNANT, FIRST TRIMESTER: ICD-10-CM

## 2019-07-10 DIAGNOSIS — O26.00 EXCESSIVE WEIGHT GAIN AFFECTING PREGNANCY: ICD-10-CM

## 2019-07-10 ASSESSMENT — MIFFLIN-ST. JEOR: SCORE: 1698.63

## 2019-07-24 ENCOUNTER — PRENATAL OFFICE VISIT - HEALTHEAST (OUTPATIENT)
Dept: MIDWIFE SERVICES | Facility: CLINIC | Age: 24
End: 2019-07-24

## 2019-07-24 DIAGNOSIS — O09.291 HX OF PREECLAMPSIA, PRIOR PREGNANCY, CURRENTLY PREGNANT, FIRST TRIMESTER: ICD-10-CM

## 2019-07-24 DIAGNOSIS — Z34.80 SUPERVISION OF OTHER NORMAL PREGNANCY, ANTEPARTUM: ICD-10-CM

## 2019-07-24 DIAGNOSIS — Z34.83 ENCOUNTER FOR SUPERVISION OF OTHER NORMAL PREGNANCY IN THIRD TRIMESTER: ICD-10-CM

## 2019-07-24 DIAGNOSIS — O26.00 EXCESSIVE WEIGHT GAIN AFFECTING PREGNANCY: ICD-10-CM

## 2019-07-24 LAB — HGB BLD-MCNC: 12.6 G/DL (ref 12–16)

## 2019-07-24 ASSESSMENT — MIFFLIN-ST. JEOR: SCORE: 1730.38

## 2019-07-26 LAB
ALLERGIC TO PENICILLIN: NO
GP B STREP DNA SPEC QL NAA+PROBE: NEGATIVE

## 2019-07-31 ENCOUNTER — PRENATAL OFFICE VISIT - HEALTHEAST (OUTPATIENT)
Dept: MIDWIFE SERVICES | Facility: CLINIC | Age: 24
End: 2019-07-31

## 2019-07-31 DIAGNOSIS — O26.00 EXCESSIVE WEIGHT GAIN AFFECTING PREGNANCY: ICD-10-CM

## 2019-07-31 DIAGNOSIS — O09.291 HX OF PREECLAMPSIA, PRIOR PREGNANCY, CURRENTLY PREGNANT, FIRST TRIMESTER: ICD-10-CM

## 2019-07-31 DIAGNOSIS — Z34.80 SUPERVISION OF OTHER NORMAL PREGNANCY, ANTEPARTUM: ICD-10-CM

## 2019-07-31 ASSESSMENT — MIFFLIN-ST. JEOR: SCORE: 1733.55

## 2019-08-07 ENCOUNTER — PRENATAL OFFICE VISIT - HEALTHEAST (OUTPATIENT)
Dept: MIDWIFE SERVICES | Facility: CLINIC | Age: 24
End: 2019-08-07

## 2019-08-07 DIAGNOSIS — Z34.80 SUPERVISION OF OTHER NORMAL PREGNANCY, ANTEPARTUM: ICD-10-CM

## 2019-08-07 DIAGNOSIS — O09.291 HX OF PREECLAMPSIA, PRIOR PREGNANCY, CURRENTLY PREGNANT, FIRST TRIMESTER: ICD-10-CM

## 2019-08-07 DIAGNOSIS — O26.00 EXCESSIVE WEIGHT GAIN AFFECTING PREGNANCY: ICD-10-CM

## 2019-08-07 ASSESSMENT — MIFFLIN-ST. JEOR: SCORE: 1746.71

## 2019-08-14 ENCOUNTER — PRENATAL OFFICE VISIT - HEALTHEAST (OUTPATIENT)
Dept: MIDWIFE SERVICES | Facility: CLINIC | Age: 24
End: 2019-08-14

## 2019-08-14 ENCOUNTER — COMMUNICATION - HEALTHEAST (OUTPATIENT)
Dept: OBGYN | Facility: CLINIC | Age: 24
End: 2019-08-14

## 2019-08-14 DIAGNOSIS — O26.00 EXCESSIVE WEIGHT GAIN AFFECTING PREGNANCY: ICD-10-CM

## 2019-08-14 DIAGNOSIS — O09.291 HX OF PREECLAMPSIA, PRIOR PREGNANCY, CURRENTLY PREGNANT, FIRST TRIMESTER: ICD-10-CM

## 2019-08-14 DIAGNOSIS — R03.0 ELEVATED BP WITHOUT DIAGNOSIS OF HYPERTENSION: ICD-10-CM

## 2019-08-14 DIAGNOSIS — Z34.80 SUPERVISION OF OTHER NORMAL PREGNANCY, ANTEPARTUM: ICD-10-CM

## 2019-08-14 LAB
ALT SERPL W P-5'-P-CCNC: 13 U/L (ref 0–45)
AST SERPL W P-5'-P-CCNC: 17 U/L (ref 0–40)
BASOPHILS # BLD AUTO: 0 THOU/UL (ref 0–0.2)
BASOPHILS NFR BLD AUTO: 0 % (ref 0–2)
CREAT SERPL-MCNC: 0.6 MG/DL (ref 0.6–1.1)
CREAT UR-MCNC: 68.5 MG/DL
EOSINOPHIL # BLD AUTO: 0.1 THOU/UL (ref 0–0.4)
EOSINOPHIL NFR BLD AUTO: 1 % (ref 0–6)
ERYTHROCYTE [DISTWIDTH] IN BLOOD BY AUTOMATED COUNT: 14.6 % (ref 11–14.5)
GFR SERPL CREATININE-BSD FRML MDRD: >60 ML/MIN/1.73M2
HCT VFR BLD AUTO: 40 % (ref 35–47)
HGB BLD-MCNC: 13.2 G/DL (ref 12–16)
LYMPHOCYTES # BLD AUTO: 2.9 THOU/UL (ref 0.8–4.4)
LYMPHOCYTES NFR BLD AUTO: 24 % (ref 20–40)
MCH RBC QN AUTO: 32.1 PG (ref 27–34)
MCHC RBC AUTO-ENTMCNC: 33 G/DL (ref 32–36)
MCV RBC AUTO: 97 FL (ref 80–100)
MONOCYTES # BLD AUTO: 1.2 THOU/UL (ref 0–0.9)
MONOCYTES NFR BLD AUTO: 10 % (ref 2–10)
NEUTROPHILS # BLD AUTO: 7.7 THOU/UL (ref 2–7.7)
NEUTROPHILS NFR BLD AUTO: 64 % (ref 50–70)
PLATELET # BLD AUTO: 200 THOU/UL (ref 140–440)
PMV BLD AUTO: 12 FL (ref 8.5–12.5)
PROTEIN, RANDOM URINE - HISTORICAL: 189 MG/DL
PROTEIN/CREAT RATIO, RANDOM UR: 2.76
RBC # BLD AUTO: 4.11 MILL/UL (ref 3.8–5.4)
URATE SERPL-MCNC: 6 MG/DL (ref 2–7.5)
WBC: 12.1 THOU/UL (ref 4–11)

## 2019-08-14 ASSESSMENT — MIFFLIN-ST. JEOR: SCORE: 1755.78

## 2019-08-15 ENCOUNTER — COMMUNICATION - HEALTHEAST (OUTPATIENT)
Dept: OBGYN | Facility: HOSPITAL | Age: 24
End: 2019-08-15

## 2019-08-18 ENCOUNTER — HOME CARE/HOSPICE - HEALTHEAST (OUTPATIENT)
Dept: HOME HEALTH SERVICES | Facility: HOME HEALTH | Age: 24
End: 2019-08-18

## 2019-08-19 ENCOUNTER — HOME CARE/HOSPICE - HEALTHEAST (OUTPATIENT)
Dept: HOME HEALTH SERVICES | Facility: HOME HEALTH | Age: 24
End: 2019-08-19

## 2019-08-19 ENCOUNTER — RECORDS - HEALTHEAST (OUTPATIENT)
Dept: ADMINISTRATIVE | Facility: OTHER | Age: 24
End: 2019-08-19

## 2019-08-23 ENCOUNTER — COMMUNICATION - HEALTHEAST (OUTPATIENT)
Dept: OBGYN | Facility: CLINIC | Age: 24
End: 2019-08-23

## 2019-08-30 ENCOUNTER — OFFICE VISIT - HEALTHEAST (OUTPATIENT)
Dept: MIDWIFE SERVICES | Facility: CLINIC | Age: 24
End: 2019-08-30

## 2019-08-30 LAB — HGB BLD-MCNC: 11.3 G/DL (ref 12–16)

## 2019-08-30 ASSESSMENT — MIFFLIN-ST. JEOR: SCORE: 1617.66

## 2019-09-09 ENCOUNTER — COMMUNICATION - HEALTHEAST (OUTPATIENT)
Dept: OBGYN | Facility: CLINIC | Age: 24
End: 2019-09-09

## 2019-09-25 ENCOUNTER — COMMUNICATION - HEALTHEAST (OUTPATIENT)
Dept: MIDWIFE SERVICES | Facility: CLINIC | Age: 24
End: 2019-09-25

## 2019-09-25 ENCOUNTER — OFFICE VISIT - HEALTHEAST (OUTPATIENT)
Dept: MIDWIFE SERVICES | Facility: CLINIC | Age: 24
End: 2019-09-25

## 2019-09-25 DIAGNOSIS — O09.291 HX OF PREECLAMPSIA, PRIOR PREGNANCY, CURRENTLY PREGNANT, FIRST TRIMESTER: ICD-10-CM

## 2019-09-25 DIAGNOSIS — Z87.59 HISTORY OF POSTPARTUM HEMORRHAGE: ICD-10-CM

## 2019-09-25 DIAGNOSIS — Z30.09 GENERAL COUNSELING FOR PRESCRIPTION OF ORAL CONTRACEPTIVES: ICD-10-CM

## 2019-09-25 ASSESSMENT — EDINBURGH POSTNATAL DEPRESSION SCALE (EPDS): TOTAL SCORE: 2

## 2019-09-25 ASSESSMENT — MIFFLIN-ST. JEOR: SCORE: 1585.45

## 2020-02-21 ENCOUNTER — OFFICE VISIT - HEALTHEAST (OUTPATIENT)
Dept: FAMILY MEDICINE | Facility: CLINIC | Age: 25
End: 2020-02-21

## 2020-02-21 DIAGNOSIS — Z00.00 ROUTINE HEALTH MAINTENANCE: ICD-10-CM

## 2020-02-21 DIAGNOSIS — F39 UNSPECIFIED MOOD (AFFECTIVE) DISORDER (H): ICD-10-CM

## 2020-02-21 ASSESSMENT — MIFFLIN-ST. JEOR: SCORE: 1663.24

## 2020-04-03 ENCOUNTER — AMBULATORY - HEALTHEAST (OUTPATIENT)
Dept: NURSING | Facility: CLINIC | Age: 25
End: 2020-04-03

## 2020-09-13 ENCOUNTER — VIRTUAL VISIT (OUTPATIENT)
Dept: FAMILY MEDICINE | Facility: OTHER | Age: 25
End: 2020-09-13
Payer: COMMERCIAL

## 2020-09-13 PROCEDURE — 99421 OL DIG E/M SVC 5-10 MIN: CPT | Performed by: PHYSICIAN ASSISTANT

## 2020-09-14 ENCOUNTER — AMBULATORY - HEALTHEAST (OUTPATIENT)
Dept: INTERNAL MEDICINE | Facility: CLINIC | Age: 25
End: 2020-09-14

## 2020-09-14 ENCOUNTER — AMBULATORY - HEALTHEAST (OUTPATIENT)
Dept: FAMILY MEDICINE | Facility: CLINIC | Age: 25
End: 2020-09-14

## 2020-09-14 DIAGNOSIS — Z20.822 SUSPECTED 2019 NOVEL CORONAVIRUS INFECTION: ICD-10-CM

## 2020-09-14 NOTE — PROGRESS NOTES
"Date: 2020 19:49:24  Clinician: Arnav Boles  Clinician NPI: [id number]  Patient: Tami Malik  Patient : 1995  Patient Address: [address] [place] [address]  Patient Phone: [phone]  Visit Protocol: URI  Patient Summary:  Tami is a 25 year old ( : 1995 ) female who initiated a [facility] Visit for COVID-19 (Coronavirus) evaluation and screening. When asked the question \"Please sign me up to receive news, health information and promotions from [facility].\", Tami responded \"No\".    Tami states her symptoms started gradually 3-4 days ago.   Her symptoms consist of facial pain or pressure, rhinitis, a sore throat, a cough, nasal congestion, and a headache.   Symptom details     Nasal secretions: The color of her mucus is clear, green, and yellow.    Cough: Tami coughs a few times an hour and her cough is not more bothersome at night. Phlegm does not come into her throat when she coughs. She does not believe her cough is caused by post-nasal drip.     Sore throat: Tami reports having mild throat pain (1-3 on a 10 point pain scale), does not have exudate on her tonsils, and can swallow liquids. She is not sure if the lymph nodes in her neck are enlarged. A rash has not appeared on the skin since the sore throat started.     Facial pain or pressure: The facial pain or pressure does not feel worse when bending or leaning forward.     Headache: She states the headache is mild (1-3 on a 10 point pain scale).      Tami denies having ear pain, anosmia, fever, vomiting, nausea, wheezing, teeth pain, ageusia, diarrhea, chills, malaise, and myalgias. She also denies taking antibiotic medication in the past month, having recent facial or sinus surgery in the past 60 days, double sickening (worsening symptoms after initial improvement), and having a sinus infection within the past year. She is not experiencing dyspnea.   Precipitating events  Within the past week, Tami has not been exposed " to someone with strep throat. She has not recently been exposed to someone with influenza. Tami has been in close contact with the following high risk individuals: children under the age of 5, adults 65 or older, people with asthma, heart disease or diabetes, and immunocompromised people.   Pertinent COVID-19 (Coronavirus) information  In the past 14 days, Tami has worked in a congregate living setting.   She either works or volunteers as a healthcare worker or a , or works or volunteers in a healthcare facility. She provides direct patient care. 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   Tami has not lived in a congregate living setting in the past 14 days. She does not live with a healthcare worker.   Tami has had a close contact with a laboratory-confirmed COVID-19 patient within 14 days of symptom onset.   Since December 2019, Tami and has not had upper respiratory infection or influenza-like illness. Has not been diagnosed with lab-confirmed COVID-19 test   Pertinent medical history  Tami does not get yeast infections when she takes antibiotics.   Tami needs a return to work/school note.   Weight: 195 lbs   Tami does not smoke or use smokeless tobacco.   She denies pregnancy and denies breastfeeding. She has menstruated in the past month.   Weight: 195 lbs    MEDICATIONS: norethindrone-ethinyl estradiol oral, ALLERGIES: NKDA  Clinician Response:  Dear Tami,   Your symptoms show that you may have coronavirus (COVID-19). This illness can cause fever, cough and trouble breathing. Many people get a mild case and get better on their own. Some people can get very sick.  What should I do?  We would like to test you for this virus.   1. Please call [phone] to schedule your visit. Explain that you were referred by [facility] to have a COVID-19 test. Be ready to share your [facility] visit ID number.  The  "following will serve as your written order for this COVID Test, ordered by me, for the indication of suspected COVID [Z20.828]: The test will be ordered in [employer], our electronic health record, after you are scheduled. It will show as ordered and authorized by Tevin Youngblood MD.  Order: COVID-19 (Coronavirus) PCR [facility]OMATIC [facility].      2. When it's time for your COVID test:  Stay at least 6 feet away from others. (If someone will drive you to your test, stay in the backseat, as far away from the  as you can.)   Cover your mouth and nose with a mask, tissue or washcloth.  Go straight to the testing site. Don't make any stops on the way there or back.      3.Starting now: Stay home and away from others (self-isolate) until:   You've had no fever---and no medicine that reduces fever---for one full day (24 hours). And...   Your other symptoms have gotten better. For example, your cough or breathing has improved. And...   At least 10 days have passed since your symptoms started.       During this time, don't leave the house except for testing or medical care.   Stay in your own room, even for meals. Use your own bathroom if you can.   Stay away from others in your home. No hugging, kissing or shaking hands. No visitors.  Don't go to work, school or anywhere else.    Clean \"high touch\" surfaces often (doorknobs, counters, handles, etc.). Use a household cleaning spray or wipes. You'll find a full list of  on the EPA website: www.epa.gov/pesticide-registration/list-n-disinfectants-use-against-sars-cov-2.   Cover your mouth and nose with a mask, tissue or washcloth to avoid spreading germs.  Wash your hands and face often. Use soap and water.  Caregivers in these groups are at risk for severe illness due to COVID-19:  o People 65 years and older  o People who live in a nursing home or long-term care facility  o People with chronic disease (lung, heart, cancer, diabetes, kidney, liver, " immunologic)  o People who have a weakened immune system, including those who:   Are in cancer treatment  Take medicine that weakens the immune system, such as corticosteroids  Had a bone marrow or organ transplant  Have an immune deficiency  Have poorly controlled HIV or AIDS  Are obese (body mass index of 40 or higher)  Smoke regularly   o Caregivers should wear gloves while washing dishes, handling laundry and cleaning bedrooms and bathrooms.  o Use caution when washing and drying laundry: Don't shake dirty laundry, and use the warmest water setting that you can.  o For more tips, go to www.cdc.gov/coronavirus/2019-ncov/downloads/10Things.pdf.    How can I take care of myself?    Get lots of rest. Drink extra fluids (unless a doctor has told you not to).   Take Tylenol (acetaminophen) for fever or pain. If you have liver or kidney problems, ask your family doctor if it's okay to take Tylenol.   Adults can take either:    650 mg (two 325 mg pills) every 4 to 6 hours, or...   1,000 mg (two 500 mg pills) every 8 hours as needed.    Note: Don't take more than 3,000 mg in one day. Acetaminophen is found in many medicines (both prescribed and over-the-counter medicines). Read all labels to be sure you don't take too much.   For children, check the Tylenol bottle for the right dose. The dose is based on the child's age or weight.    If you have other health problems (like cancer, heart failure, an organ transplant or severe kidney disease): Call your specialty clinic if you don't feel better in the next 2 days.       Know when to call 911. Emergency warning signs include:    Trouble breathing or shortness of breath Pain or pressure in the chest that doesn't go away Feeling confused like you haven't felt before, or not being able to wake up Bluish-colored lips or face.  Where can I get more information?    [employer] -- About COVID-19: [email or web address]   CDC -- What to Do If You're Sick:  www.cdc.gov/coronavirus/2019-ncov/about/steps-when-sick.html   CDC -- Ending Home Isolation: www.cdc.gov/coronavirus/2019-ncov/hcp/disposition-in-home-patients.html   [facility] -- Caring for Someone: www.cdc.gov/coronavirus/2019-ncov/if-you-are-sick/care-for-someone.html   [facility] -- Interim Guidance for Hospital Discharge to Home: www.[email or web address]./diseases/coronavirus/hcp/hospdischarge.pdf   [facility] clinical trials (COVID-19 research studies): [email or web address].[email or web address]-clinical-trials    Below are the COVID-19 hotlines at the [place] Department of [facility] ([facility]). Interpreters are available.    For health questions: Call 651-536-6866 or 1-435.416.7319 (7 a.m. to 7 p.m.) For questions about schools and childcare: Call 857-102-1369 or 1-911.978.4788 (7 a.m. to 7 p.m.)    Diagnosis: Cough  Diagnosis ICD: R05

## 2020-09-17 ENCOUNTER — COMMUNICATION - HEALTHEAST (OUTPATIENT)
Dept: SCHEDULING | Facility: CLINIC | Age: 25
End: 2020-09-17

## 2020-11-03 ENCOUNTER — COMMUNICATION - HEALTHEAST (OUTPATIENT)
Dept: MIDWIFE SERVICES | Facility: CLINIC | Age: 25
End: 2020-11-03

## 2020-11-04 ENCOUNTER — OFFICE VISIT - HEALTHEAST (OUTPATIENT)
Dept: MIDWIFE SERVICES | Facility: CLINIC | Age: 25
End: 2020-11-04

## 2020-11-04 DIAGNOSIS — O21.9 NAUSEA AND VOMITING IN PREGNANCY: ICD-10-CM

## 2020-11-04 DIAGNOSIS — O36.80X0 PREGNANCY OF UNKNOWN ANATOMIC LOCATION: ICD-10-CM

## 2020-11-20 ENCOUNTER — PRENATAL OFFICE VISIT - HEALTHEAST (OUTPATIENT)
Dept: MIDWIFE SERVICES | Facility: CLINIC | Age: 25
End: 2020-11-20

## 2020-11-20 ENCOUNTER — HOSPITAL ENCOUNTER (OUTPATIENT)
Dept: ULTRASOUND IMAGING | Facility: CLINIC | Age: 25
Discharge: HOME | End: 2020-11-20

## 2020-11-20 ENCOUNTER — AMBULATORY - HEALTHEAST (OUTPATIENT)
Dept: MIDWIFE SERVICES | Facility: CLINIC | Age: 25
End: 2020-11-20

## 2020-11-20 DIAGNOSIS — Z34.81 ENCOUNTER FOR SUPERVISION OF OTHER NORMAL PREGNANCY IN FIRST TRIMESTER: ICD-10-CM

## 2020-11-20 DIAGNOSIS — O36.80X0 PREGNANCY OF UNKNOWN ANATOMIC LOCATION: ICD-10-CM

## 2020-11-20 DIAGNOSIS — O21.9 NAUSEA AND VOMITING IN PREGNANCY: ICD-10-CM

## 2020-11-20 LAB
BASOPHILS # BLD AUTO: 0 THOU/UL (ref 0–0.2)
BASOPHILS NFR BLD AUTO: 1 % (ref 0–2)
EOSINOPHIL # BLD AUTO: 0.2 THOU/UL (ref 0–0.4)
EOSINOPHIL NFR BLD AUTO: 2 % (ref 0–6)
ERYTHROCYTE [DISTWIDTH] IN BLOOD BY AUTOMATED COUNT: 13.1 % (ref 11–14.5)
HCT VFR BLD AUTO: 42.9 % (ref 35–47)
HGB BLD-MCNC: 14.3 G/DL (ref 12–16)
HIV 1+2 AB+HIV1 P24 AG SERPL QL IA: NEGATIVE
IMM GRANULOCYTES # BLD: 0 THOU/UL
IMM GRANULOCYTES NFR BLD: 0 %
LYMPHOCYTES # BLD AUTO: 2.3 THOU/UL (ref 0.8–4.4)
LYMPHOCYTES NFR BLD AUTO: 27 % (ref 20–40)
MCH RBC QN AUTO: 29.9 PG (ref 27–34)
MCHC RBC AUTO-ENTMCNC: 33.3 G/DL (ref 32–36)
MCV RBC AUTO: 90 FL (ref 80–100)
MONOCYTES # BLD AUTO: 0.5 THOU/UL (ref 0–0.9)
MONOCYTES NFR BLD AUTO: 6 % (ref 2–10)
NEUTROPHILS # BLD AUTO: 5.4 THOU/UL (ref 2–7.7)
NEUTROPHILS NFR BLD AUTO: 64 % (ref 50–70)
PLATELET # BLD AUTO: 395 THOU/UL (ref 140–440)
PMV BLD AUTO: 10.5 FL (ref 8.5–12.5)
RBC # BLD AUTO: 4.78 MILL/UL (ref 3.8–5.4)
WBC: 8.5 THOU/UL (ref 4–11)

## 2020-11-20 ASSESSMENT — EDINBURGH POSTNATAL DEPRESSION SCALE (EPDS): TOTAL SCORE: 3

## 2020-11-20 ASSESSMENT — MIFFLIN-ST. JEOR: SCORE: 1598.38

## 2020-11-21 LAB
ABO/RH(D): NORMAL
ABORH REPEAT: NORMAL
ANTIBODY SCREEN: NEGATIVE
BACTERIA SPEC CULT: NO GROWTH
HBV SURFACE AG SERPL QL IA: NEGATIVE
T PALLIDUM AB SER QL: NEGATIVE

## 2020-11-22 ENCOUNTER — COMMUNICATION - HEALTHEAST (OUTPATIENT)
Dept: OBGYN | Facility: CLINIC | Age: 25
End: 2020-11-22

## 2020-11-23 LAB
HCV AB SERPL QL IA: NEGATIVE
RUBV IGG SERPL QL IA: POSITIVE

## 2020-11-30 ENCOUNTER — COMMUNICATION - HEALTHEAST (OUTPATIENT)
Dept: MIDWIFE SERVICES | Facility: CLINIC | Age: 25
End: 2020-11-30

## 2020-12-18 ENCOUNTER — PRENATAL OFFICE VISIT - HEALTHEAST (OUTPATIENT)
Dept: MIDWIFE SERVICES | Facility: CLINIC | Age: 25
End: 2020-12-18

## 2020-12-18 DIAGNOSIS — O21.9 NAUSEA AND VOMITING IN PREGNANCY: ICD-10-CM

## 2020-12-18 DIAGNOSIS — Z34.81 ENCOUNTER FOR SUPERVISION OF OTHER NORMAL PREGNANCY IN FIRST TRIMESTER: ICD-10-CM

## 2020-12-18 DIAGNOSIS — Z34.82 ENCOUNTER FOR SUPERVISION OF OTHER NORMAL PREGNANCY IN SECOND TRIMESTER: ICD-10-CM

## 2020-12-18 ASSESSMENT — MIFFLIN-ST. JEOR: SCORE: 1600.64

## 2021-01-01 ENCOUNTER — OFFICE VISIT - HEALTHEAST (OUTPATIENT)
Dept: FAMILY MEDICINE | Facility: CLINIC | Age: 26
End: 2021-01-01

## 2021-01-01 DIAGNOSIS — Z20.822 CLOSE EXPOSURE TO 2019 NOVEL CORONAVIRUS: ICD-10-CM

## 2021-01-03 ENCOUNTER — AMBULATORY - HEALTHEAST (OUTPATIENT)
Dept: FAMILY MEDICINE | Facility: CLINIC | Age: 26
End: 2021-01-03

## 2021-01-03 DIAGNOSIS — Z20.822 CLOSE EXPOSURE TO 2019 NOVEL CORONAVIRUS: ICD-10-CM

## 2021-01-04 ENCOUNTER — COMMUNICATION - HEALTHEAST (OUTPATIENT)
Dept: SCHEDULING | Facility: CLINIC | Age: 26
End: 2021-01-04

## 2021-01-04 LAB
SARS-COV-2 PCR COMMENT: NORMAL
SARS-COV-2 RNA SPEC QL NAA+PROBE: NEGATIVE
SARS-COV-2 VIRUS SPECIMEN SOURCE: NORMAL

## 2021-01-14 ENCOUNTER — AMBULATORY - HEALTHEAST (OUTPATIENT)
Dept: OBGYN | Facility: CLINIC | Age: 26
End: 2021-01-14

## 2021-01-14 ENCOUNTER — HOSPITAL ENCOUNTER (OUTPATIENT)
Dept: ULTRASOUND IMAGING | Facility: CLINIC | Age: 26
Discharge: HOME | End: 2021-01-14
Attending: ADVANCED PRACTICE MIDWIFE

## 2021-01-14 DIAGNOSIS — Z34.81 ENCOUNTER FOR SUPERVISION OF OTHER NORMAL PREGNANCY IN FIRST TRIMESTER: ICD-10-CM

## 2021-01-19 ENCOUNTER — PRENATAL OFFICE VISIT - HEALTHEAST (OUTPATIENT)
Dept: MIDWIFE SERVICES | Facility: CLINIC | Age: 26
End: 2021-01-19

## 2021-01-19 ENCOUNTER — COMMUNICATION - HEALTHEAST (OUTPATIENT)
Dept: MIDWIFE SERVICES | Facility: CLINIC | Age: 26
End: 2021-01-19

## 2021-01-19 DIAGNOSIS — O21.9 NAUSEA AND VOMITING IN PREGNANCY: ICD-10-CM

## 2021-01-19 DIAGNOSIS — Z34.82 ENCOUNTER FOR SUPERVISION OF OTHER NORMAL PREGNANCY IN SECOND TRIMESTER: ICD-10-CM

## 2021-01-19 DIAGNOSIS — O09.291 HX OF PREECLAMPSIA, PRIOR PREGNANCY, CURRENTLY PREGNANT, FIRST TRIMESTER: ICD-10-CM

## 2021-01-19 DIAGNOSIS — Z87.59 HISTORY OF POSTPARTUM HEMORRHAGE: ICD-10-CM

## 2021-01-19 ASSESSMENT — MIFFLIN-ST. JEOR: SCORE: 1618.79

## 2021-01-25 ENCOUNTER — AMBULATORY - HEALTHEAST (OUTPATIENT)
Dept: FAMILY MEDICINE | Facility: CLINIC | Age: 26
End: 2021-01-25

## 2021-01-25 ENCOUNTER — OFFICE VISIT - HEALTHEAST (OUTPATIENT)
Dept: FAMILY MEDICINE | Facility: CLINIC | Age: 26
End: 2021-01-25

## 2021-01-25 DIAGNOSIS — Z20.822 SUSPECTED COVID-19 VIRUS INFECTION: ICD-10-CM

## 2021-01-26 ENCOUNTER — COMMUNICATION - HEALTHEAST (OUTPATIENT)
Dept: SCHEDULING | Facility: CLINIC | Age: 26
End: 2021-01-26

## 2021-02-16 ENCOUNTER — AMBULATORY - HEALTHEAST (OUTPATIENT)
Dept: LAB | Facility: CLINIC | Age: 26
End: 2021-02-16

## 2021-02-16 ENCOUNTER — PRENATAL OFFICE VISIT - HEALTHEAST (OUTPATIENT)
Dept: MIDWIFE SERVICES | Facility: CLINIC | Age: 26
End: 2021-02-16

## 2021-02-16 DIAGNOSIS — N83.201 CYST OF RIGHT OVARY: ICD-10-CM

## 2021-02-16 DIAGNOSIS — O09.291 HX OF PREECLAMPSIA, PRIOR PREGNANCY, CURRENTLY PREGNANT, FIRST TRIMESTER: ICD-10-CM

## 2021-02-16 LAB
ALT SERPL W P-5'-P-CCNC: 10 U/L (ref 0–45)
AST SERPL W P-5'-P-CCNC: 10 U/L (ref 0–40)
HGB BLD-MCNC: 11.4 G/DL (ref 12–16)
PLATELET # BLD AUTO: 359 THOU/UL (ref 140–440)

## 2021-02-16 ASSESSMENT — MIFFLIN-ST. JEOR: SCORE: 1640.11

## 2021-02-17 ENCOUNTER — COMMUNICATION - HEALTHEAST (OUTPATIENT)
Dept: MIDWIFE SERVICES | Facility: CLINIC | Age: 26
End: 2021-02-17

## 2021-02-18 ENCOUNTER — COMMUNICATION - HEALTHEAST (OUTPATIENT)
Dept: ADMINISTRATIVE | Facility: CLINIC | Age: 26
End: 2021-02-18

## 2021-03-16 ENCOUNTER — PRENATAL OFFICE VISIT - HEALTHEAST (OUTPATIENT)
Dept: MIDWIFE SERVICES | Facility: CLINIC | Age: 26
End: 2021-03-16

## 2021-03-16 DIAGNOSIS — O09.629 SUPERVISION OF HIGH-RISK PREGNANCY OF YOUNG MULTIGRAVIDA: ICD-10-CM

## 2021-03-16 DIAGNOSIS — O09.291 HX OF PREECLAMPSIA, PRIOR PREGNANCY, CURRENTLY PREGNANT, FIRST TRIMESTER: ICD-10-CM

## 2021-03-16 LAB
CREAT UR-MCNC: 10.5 MG/DL
FASTING STATUS PATIENT QL REPORTED: NORMAL
GLUCOSE 1H P 50 G GLC PO SERPL-MCNC: 114 MG/DL (ref 70–139)
HGB BLD-MCNC: 11.7 G/DL (ref 12–16)
PROTEIN, RANDOM URINE - HISTORICAL: <7 MG/DL
PROTEIN/CREAT RATIO, RANDOM UR: NORMAL

## 2021-03-16 ASSESSMENT — MIFFLIN-ST. JEOR: SCORE: 1679.57

## 2021-03-17 LAB — T PALLIDUM AB SER QL: NEGATIVE

## 2021-04-13 ENCOUNTER — PRENATAL OFFICE VISIT - HEALTHEAST (OUTPATIENT)
Dept: MIDWIFE SERVICES | Facility: CLINIC | Age: 26
End: 2021-04-13

## 2021-04-13 DIAGNOSIS — Z34.82 ENCOUNTER FOR SUPERVISION OF OTHER NORMAL PREGNANCY IN SECOND TRIMESTER: ICD-10-CM

## 2021-04-13 DIAGNOSIS — O09.291 HX OF PREECLAMPSIA, PRIOR PREGNANCY, CURRENTLY PREGNANT, FIRST TRIMESTER: ICD-10-CM

## 2021-04-13 ASSESSMENT — MIFFLIN-ST. JEOR: SCORE: 1681.38

## 2021-04-15 ENCOUNTER — COMMUNICATION - HEALTHEAST (OUTPATIENT)
Dept: MIDWIFE SERVICES | Facility: CLINIC | Age: 26
End: 2021-04-15

## 2021-04-27 ENCOUNTER — PRENATAL OFFICE VISIT - HEALTHEAST (OUTPATIENT)
Dept: MIDWIFE SERVICES | Facility: CLINIC | Age: 26
End: 2021-04-27

## 2021-04-27 DIAGNOSIS — Z87.59 HISTORY OF POSTPARTUM HEMORRHAGE: ICD-10-CM

## 2021-04-27 DIAGNOSIS — O26.03 EXCESSIVE WEIGHT GAIN DURING PREGNANCY IN THIRD TRIMESTER: ICD-10-CM

## 2021-04-27 DIAGNOSIS — O09.291 HX OF PREECLAMPSIA, PRIOR PREGNANCY, CURRENTLY PREGNANT, FIRST TRIMESTER: ICD-10-CM

## 2021-04-27 DIAGNOSIS — Z34.82 ENCOUNTER FOR SUPERVISION OF OTHER NORMAL PREGNANCY IN SECOND TRIMESTER: ICD-10-CM

## 2021-04-27 ASSESSMENT — EDINBURGH POSTNATAL DEPRESSION SCALE (EPDS): TOTAL SCORE: 3

## 2021-04-27 ASSESSMENT — MIFFLIN-ST. JEOR: SCORE: 1690.91

## 2021-05-11 ENCOUNTER — PRENATAL OFFICE VISIT - HEALTHEAST (OUTPATIENT)
Dept: MIDWIFE SERVICES | Facility: CLINIC | Age: 26
End: 2021-05-11

## 2021-05-11 DIAGNOSIS — Z34.82 ENCOUNTER FOR SUPERVISION OF OTHER NORMAL PREGNANCY IN SECOND TRIMESTER: ICD-10-CM

## 2021-05-11 LAB — HGB BLD-MCNC: 12 G/DL (ref 12–16)

## 2021-05-11 ASSESSMENT — MIFFLIN-ST. JEOR: SCORE: 1696.81

## 2021-05-12 LAB
ALLERGIC TO PENICILLIN: NO
GP B STREP DNA SPEC QL NAA+PROBE: NEGATIVE

## 2021-05-18 ENCOUNTER — PRENATAL OFFICE VISIT - HEALTHEAST (OUTPATIENT)
Dept: MIDWIFE SERVICES | Facility: CLINIC | Age: 26
End: 2021-05-18

## 2021-05-18 ASSESSMENT — MIFFLIN-ST. JEOR: SCORE: 1700.44

## 2021-05-25 ENCOUNTER — PRENATAL OFFICE VISIT - HEALTHEAST (OUTPATIENT)
Dept: MIDWIFE SERVICES | Facility: CLINIC | Age: 26
End: 2021-05-25

## 2021-05-25 DIAGNOSIS — O26.03 EXCESSIVE WEIGHT GAIN DURING PREGNANCY IN THIRD TRIMESTER: ICD-10-CM

## 2021-05-25 DIAGNOSIS — O09.291 HX OF PREECLAMPSIA, PRIOR PREGNANCY, CURRENTLY PREGNANT, FIRST TRIMESTER: ICD-10-CM

## 2021-05-25 DIAGNOSIS — Z34.83 ENCOUNTER FOR SUPERVISION OF OTHER NORMAL PREGNANCY IN THIRD TRIMESTER: ICD-10-CM

## 2021-05-25 ASSESSMENT — MIFFLIN-ST. JEOR: SCORE: 1709.51

## 2021-05-27 VITALS
HEIGHT: 66 IN | HEART RATE: 84 BPM | SYSTOLIC BLOOD PRESSURE: 110 MMHG | DIASTOLIC BLOOD PRESSURE: 70 MMHG | WEIGHT: 212 LBS | BODY MASS INDEX: 34.07 KG/M2

## 2021-05-27 VITALS
DIASTOLIC BLOOD PRESSURE: 66 MMHG | WEIGHT: 211.2 LBS | BODY MASS INDEX: 33.94 KG/M2 | HEART RATE: 68 BPM | SYSTOLIC BLOOD PRESSURE: 108 MMHG | HEIGHT: 66 IN

## 2021-05-27 NOTE — PATIENT INSTRUCTIONS - HE
[facility] Nurse Midwives - Contact information:  Appointment line and to get a hold of CNM in clinic Monday-Friday 8 am - 5 pm:  [phone].  There are some clinics with early start times (1st appointment 7:40 am) and others with evening hours (last appointment 6:20 pm).  Most are typically open from 8 am to 5 pm.    CNM on call answering service: [phone].  Specify your hospital of choice and leave a brief message for CNM;  will then page CNM who is on call at your specified hospital and you should receive a call back with 15 minutes.  Be sure that your ringer is audible and that you can accept blocked calls so that we can get back in touch with you! This number should be reserved for urgent needs if during the day, before 8 am, after 5 pm, weekends, holidays.    Contact the on-call CNM with warning signs, such as:    vaginal bleeding     Vaginal discharge and itching or pain and burning during urination    Leg/calf pain or swelling on one side    severe abdominal pain    nausea and vomiting more than 4-5 times a day, or if you are unable to keep anything down    fever more than 100.4 degrees F.          You are invited to  Meet the [facility] Nurse-Midwives  A way to tour the hospital Labor and Delivery unit and meet the midwives that attend births since you may not have the opportunity to meet them during your prenatal care.  Some sessions are informal meet and greet type social hours, others address a specific concern or topic.    2019 7-8pm  [facility]    2019 7-8pm  [facility]    Thursday, August 15, 2019 7-8pm  [facility]    2019 7-8 pm  [facility]    Please call [phone] to register      UNDERSTANDING  LABOR    Going into labor before your 37th week of pregnancy is called  labor.   labor can cause your baby to be born too soon. This can lead to a number of health problems that may affect your baby. From 28-35 weeks, Patients are advised to be evaluated at [facility] since they have a  Intensive Care Unit (NICU).  -Before labor, the cervix is thick and closed.  -In  labor, the cervix begins to efface (thin) and dilate (open) over a short period of time    Symptoms of  Labor  If you believe you re having  labor, contact the midwives right away. But contractions alone don t mean you re in  labor. What matters more are changes in your cervix (the lower end of the uterus). Symptoms of  labor include:    five or more contractions per hour    Strong & frequent contractions    Constant menstrual-like cramping    Low-back pain    Mucous or bloody vaginal discharge    Bleeding or spotting in the second or third trimester    Evaluating  Labor  Your midwife will try to find out whether you re in  labor or whether you re just having contractions.She may watch you for a few hours. The following tests may be done:    Pelvic exam to see if your cervix has effaced (thinned) and dilated (opened)    Uterine activity monitoring to detect contractions    Fetal monitoring to check the health of your baby    Ultrasound to check your baby s size and position    Caring for Yourself At Home  If you have contractions  but your cervix is still thick and closed, the midwife may ask you to do the following at home:    Drink plenty of water.    Do fewer activities.    Rest in bed on your side.    Avoid intercourse and nipple stimulation.    When to Call Your Midwife    Five or more contractions per hour    Bag of water breaks    Bleeding or spotting     If You Need Hospital Care   labor often requires that you have hospital care and complete bed rest. You may have an IV (intravenous) line to get fluids. And you may be given pills or  an injection to help prevent contractions. Finally, you may receive medication (corticosteroids) that helps your baby s lungs mature more quickly.    Are You At Risk?  Any pregnant woman can have  labor. It may start for no reason. But these risk factors can increase your chances:    Past  labor or past early birth    Smoking and drug or alcohol use during pregnancy    Multiple fetuses (twins or more)    Problems with the shape of the uterus    Bleeding during the pregnancy    The Dangers of  Birth  A baby born too soon may have health problems. This is because the baby didn t have enough time to mature. The baby then is at risk of:    Not breastfeeding well    Having immature lungs    Bleeding in the brain    Dying    Reaching Term  Your goal is to get as close to term as you can before giving birth. The closer you get to term, the higher your chance of having a healthy baby. Work with your healthcare provider. Together, you can take steps that may keep you from giving birth too early.        Testing for Gestational Diabetes in Pregnancy  [facility] are committed to providing safe care during your pregnancy. We follow the recommendations of the American Diabetes Association and the American College of Obstetricians and Gynecologists to test all pregnant women for gestational diabetes. Testing early in pregnancy (if you have risk factors) and testing all women between 26-28 weeks follows local and national guidelines for care during pregnancy. Clients who feel that they cannot consent to such testing, may choose to transfer their care to our consultant obstetricians.  What is the test?  Eat normally on the day of the test; a diet rich in protein, whole grains, and vegetables would be best. Avoid simple sugars, white flour products and juices prior to testing.  You will be asked to drink a 10 oz glucose beverage (50 gm, about the same as a can of root beer).  The product is not  carbonated as it has to be consumed within 5 minutes. During the next hour, you are seen for a prenatal visit, but are asked to limit your activity around the clinic. Feel free to bring a book or your computer.   Any level less than 140 for this  glucose challenge test  is considered normal. If measured at 140 to 185, the diagnostic test, a  3 hour glucose tolerance test  will be conducted. If 2 or more readings are abnormal, the diagnosis of gestational diabetes is confirmed and a referral to a diabetes educator will be made. If the level is 185 or above, this confirms the diagnosis and a referral will be made without administering the 3 hour test.  A fasting blood sugar may be performed sometime in the first trimester if you have risk factors for the development of gestational diabetes such as a previous diagnosis or birth of a large baby or a close family relative with diabetes.  What is gestational diabetes?  Your body converts what you eat into glucose. In response to rising blood sugar levels it secretes insulin in order to be able to utilize that glucose. During pregnancy as the placenta grows and matures, it secretes hormones that are necessary to assure baby s growth and development, however, they also reduce the action of insulin in the mother. In some cases too much insulin is blocked (this is called  insulin resistance ) and blood sugar in the mother rises above a normal level. Pregnant women who have never had diabetes before but who have high blood sugar (glucose) levels during pregnancy are said to have gestational diabetes. Gestational diabetes affects about 4-7% of all pregnancies.  What if I have gestational diabetes?  If either of the tests for gestational diabetes show that you have this condition, a referral will be made to visit with the diabetes educator. The educator will help you to make wise food choices, count carbohydrates, monitor your blood sugar and record your levels in a journal. We  ask that you bring this diary with you at each prenatal visit. You may meet with the educator several times during the remainder of your pregnancy.  How gestational diabetes can affect your baby  Some mothers may wonder why testing for GDM is delayed until the early part of the 3rd trimester for most women. Gestational diabetes has an impact on the baby at the time of rapid body growth. When the mother s blood has elevated sugar levels, extra glucose crosses the placenta causing the baby to have excess weight gain ( macrosomia ). Some babies are too big to be born vaginally so their mother must have  births. Babies of mothers with uncontrolled gestational diabetes may have difficult births that can cause trauma to the mother and in rare circumstances, babies may suffer fractures or oxygen deprivation during birth. They may have difficulty maintaining their own blood sugar after birth and they may also have trouble adapting to life outside. Recent research indicates that babies of mothers with uncontrolled or undiagnosed gestational diabetes are at risk for obesity and type 2 diabetes. Women who develop gestational diabetes are more likely to develop type 2 diabetes within 15 years after their pregnancy.  Additional Information  The American College of Nurse Midwives (ACNM) provides an information sheet describing diabetes screening in pregnancy: http://www.womensdocs.com/ramez/pdf/Second_Trimester/Gestational_Diabetes.pdf    You can visit the American Diabetes Association website http://www.diabetes.org for additional information and to purchase their book,  Gestational Diabetes: What to Expect .    A brochure from the American College of Obstetrics and Gynecology is available at:   http://www.acog.org/~/media/For%20Patients/lwc847.pdf?dmc=1&ns=[id number]  Testing for gestational diabetes is a critical part of your prenatal journey. Thank you for taking good care of yourself and your baby.    HEALTHY  PREGNANCY CARE: 22-26 WEEKS PREGNANT    You are finishing your second trimester. Your baby is developing rapidly. At this stage, babies have a sense of balance, can respond to touch, and are recognizing parent voices.  Your baby will be moving around more now.  You may notice Orondo-Parra contractions now, which are painless and prepare the uterus for the delivery.    Nutrition: During this time, you may find that your nausea and fatigue are gone. Overall, you may feel better and have more energy than you did in your first trimester. Be sure you are getting enough calcium and iron in your diet. Your prenatal vitamins cannot supply all of the nutrients you need, so continue to eat 3-4 servings of dairy foods and 2-3 servings of meat/fish/poultry/nuts every day. Foods high in iron include: red meats, eggs, dark green vegetables, dark yellow vegetables, nuts, kidney beans and chickpeas. Some cereals are fortified with iron, so look at the food labels for 100% of the daily requirement for iron.     Discuss your work situation with your midwife or physician as needed. If you stand for long periods of time, you may need to make changes and take breaks.    [place] for childbirth and parenting classes, including an infant CPR class. Breastfeeding classes are recommended too.    Childbirth and Parenting Education:   [place] parenting center: http://Social Media NetworksDavid Grant USAF Medical CenterYbrain/   [phone]  [place]: (education, yoga & wellness) www.[employer]  Enlightened Mama: www.CerRxenedSlide.[place] [employer]   Childbirth collective: (Parent topic nights)  www.childbirthcollective.org/  Hypnobabies:  www.hypnobabiestwincities.com/  Hypnobirthing:  Http://hypnobirthing.com/    Book Recommendations:   Makayla Xavi's Birthing From Within--first few chapters include a new-age tone, you may prefer to skip it and keep going, because there is good stuff later.  This book recommendation covers emotional preparation, but does cover coping with pain, and use of  "both pharmacological and nonpharmacological methods.    Dr. Garcia' The Pregnancy Book and The Birth Book--the pregnancy book goes month-by month    Womanly Art of Breastfeeding by La Leche League International   Bestfeeding by Lalita Gibbs--great pictures    Mothering Your Nursing Toddler, by Vidhi Enriquez.   Addresses dealing with so many of the challenging behaviors of a nursing toddler.  How Weaning Happens, by La Leche League.  Discusses weaning at all ages, from medically necessary weaning of an infant, all the way up to age 5 (or older), with why/why not, and strategies.  Very empowering book both for deciding to wean and deciding not to.    American College of Nurse-Midwives (ACNM) http://www.midwife.org/; look at the informational handouts at http://www.midwife.org/Share-With-Women     www.mymidwife.org    Mother to Baby (Medication and Herbal guidance in pregnancy): http://www.mothertobaby.org  Toll-Free Hotline: 347.706.5133  LactMed (Medication use while breastfeeding): http://toxnet.nlm.nih.gov/newtoxnet/lactmed.htm    Women's Health.gov:  http://www.womenshealth.gov/a-z-topics/index.html    American pregnancy association - http://americanpregnancy.org    Centering Pregnancy (group prenatal care option): http://centeringhealthcare.org    Information about doulas:  Childbirth collective: http://www.childbirthcollective.org/  Doulas of [place] (CATHERINE):  www.catherine.org  [facility]  project: http://twincitiesdoulaproject.com/     Early Childhood and Family Education (ECFE):  ECFE offers parents hands-on learning experiences that will nourish a lifetime of teachable moments.  http://ecfe.info/ecfe-home/    March of Dimes www.Cuponomia.Sc[employer]-X     FDA - Nutrition  www.mypyramid.gov  Under \"For Consumers,\" click on \"pregnant and breastfeeding women.\"      Centers for Disease Control and Prevention (CDC) - Vaccines : http://www.cdc.gov/vaccines/       When researching information on the web, " question the validity of websites.  The [employer] .gov, .edu and.org tend to be more reliable information.  If there are a lot of advertisements, be cautious of the information provided. Stay away from blogs and chat rooms please!    How can you care for yourself at home?   You can refer to the Starting Out Right book or find it online at http://www.[facility]altheast.org/images/stories/maternity/[facility]-Starting-Out-Right.pdf or http://www.healtheast.org/images/stories/flipbooks/[facility]-starting-out-right/[facility]-starting-out-right.html#p=8     You can sign up for a weekly parenting e-mail that gives support, tips and advice from health care professionals that starts with pregnancy and continues through the toddler years. To register, go to www.[employer].org/baby at any time during your pregnancy.      Baby Feeding in the Hospital: Information, Support and Resources    As you prepare for the birth of your child, you will want to consider options for feeding your baby including breast-feeding and/or baby formula. The American Academy of Pediatrics recommends exclusive breast-feeding for the first six months (although any amount of breast-feeding is beneficial).  However, we also understand that breast-feeding is a personal choice and not for everyone. Whether or not you choose to breast-feed, your decision will be respected by our staff.    There are numerous benefits of breast-feeding; here are a few to consider:    Provides antibodies to protect your baby from infections and diseases    The cost: formula can cost over $1,500 per year    Convenience, no warming up or sterilizing bottles and supplies    The physical contact with breastfeeding can make babies feel secure, warm and comforted    What ever my feeding choice, what can I expect after I deliver my baby?    Your baby will usually be placed skin-to-skin immediately following birth. The skin to skin contact between you and your baby will be a special and  memorable time. The bonding and attachment comforts your baby and has a positive effect on baby s brain development.     Having your baby  room in  with you also helps you start to learn your baby s body rhythms and sleep cycle.      You will also begin to learn your baby s cues (signals) that he or she is ready to feed.    When do I start to feed my baby?  As soon as possible after your baby s birth, you will be encouraged to begin feeding.  In the first couple of weeks, your baby will eat often.  Breastfeeding babies usually eat at least 8 times in 24 hours.  Babies fed formula usually eat at least 7 times in 24 hours.      Breast-feeding tips:    Get comfortable and use pillows for support.    Have your baby at the level of your breast, facing you,  tummy to tummy .      Touch your nipple to your baby s lips so you baby s mouth opens wide (rooting reflex).  Aim the nipple toward the roof of your baby s mouth. When your baby opens his or her mouth, pull your baby toward your breast to help your baby  latch on  to your nipple and much of the areola area.    Hand expressing your breast milk can assist with latching your baby to your breast, if needed.    Ask for help, breastfeeding may seem awkward or uncomfortable at first, this is normal. There are numerous resources available at [facility] and beyond.     If your goal is to exclusively breastfeed, avoid using any formula or artificial nipples (including bottles and pacifiers) while you are your baby are learning to breastfeed unless there is a medical reason.       Mixing breastfeeding and formula can interfere with how you begin building your milk supply.  It can impact how you and your baby  learn  to breastfeeding together and alter the natural growth of  good  bacteria in your baby s stomach.    Delay a pacifier or a bottle in the first few weeks until breastfeeding is well established. This is often around 3 weeks of age.    Ask your nurse  to show you how to hand express.   Breast milk can be kept in the refrigerator or freezer for later use.        Touring the Maternity Care Center  To schedule a tour at either [place] or [facility], please do so online using the following links:  [facility] - https://www.Biomedix vascular solution/registerlist.asp?s=6&m=303&vs=5&p=2&[email or web address]&ps=1&group=37&it=1&ttj=199  [facility] - https://www.Biomedix vascular solution/registerlist.asp?s=6&m=303&vs=5&p=2&rdluv=299&ps=1&group=38&it=1&bpp=727    Hospital and Clinic  Resources:  -Schedule an appointment with a [facility] CNM who is also a Lactation Consultant by calling [phone]     -Schedule a clinic appointment with a [facility] CNM with dedicated clinic hours for breastfeeding assistance by calling [phone]. Breastfeeding clinic visits are at [facility] on Wednesdays, [facility] on Tuesdays and [facility] on Thursdays.     [facility] Lactation Clinics located at [facility], [facility] and [facility]  Call: [phone].    Inpatient support    Outpatient appointments    Telephone consultation    Breast-feeding classes available through [employer]      -Attend a baby weigh in at [facility].  Lactation consultants are available to answer questions  [place]: Tuesdays 1:00 - 2:00  [facility]: Mondays 1:00 - 2:00  www.[employer]    -Attend one of the New Mama groups at [facility] in [facility].  [facility] also offers one-on-one in home and in office lactation consults.   www.[facility]    -Attend a LeLeche League meeting.  Multiple groups in several locations throughout the [facility]. The meetings are no-cost and always informative breastfeeding education session through [employer] La Leche League  Www.lllofmndas.org/  Medication use while breastfeeding: http://toxnet.nlm.nih.gov/newtoxnet/lactmed.htm     Online  Resources:    [facility].org/baby sign up for free online weekly e-mail    [facility]ast.org/maternity    Breastfeedingmadesimple.com    [employer]Mergeli.[employer] (La Leche League)    Normalfed.com    Womenshealth.gov/breastfeeding    Workandpump.com    Breast-feeding Supplies & Pumps:  Talk to your insurance provider or WIC (Women, Infants and Children) to learn more about options available to you. Recent health insurance changes may include additional coverage for supplies and pumps.    Public Health:  Women, Infants and Children Nutrition program (WIC): provides breast-feeding support and education in addition to formal feeding moms. 569-SGD-5995 or http://www.health.[facility].mn.us/divs/fh/wic    Family Health Home Visiting: [facility] Nurse home visits are available. Talk to your provider to see if you qualify. Most [facility] have a program available.    Additional Resources:  La Leche League is an international, nonprofit, nonsectarian organization offering information, education, and support to mothers who want to breast-feed their babies. Local groups offer phone help and monthly meetings. Visit [employer] or [employer] and us the  Find local support  drop down menu or click on the  Resources  tab.    [place] Breastfeeding Resources: 7-549-591-BABY (5404) toll free    National Breastfeeding Help Line trained breastfeeding peer counselors can help answer common breast-feeding questions by phone. Monday-Friday: English/Djiboutian  4-920- [phone] toll free, 1-610.784.7723 (TTY)    [facility] Connection: [phone] (0813)

## 2021-05-27 NOTE — PROGRESS NOTES
Tami is here with Donovan and her daughter Christiano today.  She is feeling much better and not having any more nausea and vomiting explain some low back pain which is improved with stretching, baths, and light exercise.  She does do yoga twice a week.  This pain generally occurs later in the day.  Recommended warm bath or shower and stretching prior to bed and massage from her partner.  Normal fetal anatomy screen and follow-up ultrasound for anatomy not seen on prior scan.  She is feeling active fetal movement.  Discussed signs and symptoms of  labor.  Will plan to do GCT, hemoglobin, and RPR at next visit.

## 2021-05-28 NOTE — PROGRESS NOTES
Tami is here alone today.  Feeling well.  Having some lower back pain.  Does yoga, stretches and uses massage to help lessen it.  Also going to the gym 3d/week.  Has pregnancy support belt but lost it!  Will look for it.  Recommended its use.  Feeling active FM.  Discussed recognizing patterns of FM over the next 4-6 weeks.  Will likely attempt natural birth or use NO.  Still taking ASA.  GDM, Hgb, and RPR today.

## 2021-05-28 NOTE — PATIENT INSTRUCTIONS - HE
[facility] Nurse Midwives - Contact information:  Appointment line and to get a hold of CNM in clinic Monday-Friday 8 am - 5 pm:  [phone].  There are some clinics with early start times (1st appointment 7:40 am) and others with evening hours (last appointment 6:20 pm).  Most are typically open from 8 am to 5 pm.    CNM on call answering service: [phone].  Specify your hospital of choice and leave a brief message for CNM;  will then page CNM who is on call at your specified hospital and you should receive a call back with 15 minutes.  Be sure that your ringer is audible and that you can accept blocked calls so that we can get back in touch with you! This number should be reserved for urgent needs if during the day, before 8 am, after 5 pm, weekends, holidays.    Contact the on-call CNM with warning signs, such as:    vaginal bleeding     Vaginal discharge and itching or pain and burning during urination    Leg/calf pain or swelling on one side    severe abdominal pain    nausea and vomiting more than 4-5 times a day, or if you are unable to keep anything down    fever more than 100.4 degrees F.      You are invited to  Meet the [facility] Nurse-Midwives  A way to tour the hospital Labor and Delivery unit and meet the midwives that attend births since you may not have the opportunity to meet them during your prenatal care.  Some sessions are informal meet and greet type social hours, others address a specific concern or topic.    Tuesday, Februrary 12, 2019 7-8pm  [facility]    Wednesday, April 17, 2019 7-8pm  [facility]    Thursday, August 15, 2019 7-8pm  [facility]    Wednesday November 13, 2019 7-8 pm  [facility]    Please call [phone] to register        Touring the Maternity Care Center  To schedule a tour at either [place] or [facility], please do so online using the  following links:  [place] - [email or web address]  [place] - [email or web address]       DAILY FETAL MOVEMENT COUNTING    One of the best ways to keep track of a healthy baby is to notice its movements. Healthy babies are very active. However, some perfectly normal babies may sleep quietly as long as 60 minutes without moving. Babies who are having problems are sluggish and move less. Counting these movements can provide your nurse-midwife with a warning of developing problems.    You should begin this counting at the around your 7th to 8th month of your pregnancy (28-32 weeks) if you are ever concerned that there is less movement than normal for your baby.     INSTRUCTIONS    1.  If able, drink 2 glasses of fluid and lie down on one side.  Put your hand on your abdomen.  2. Count 10 separate times that the baby moves. A movement may be a kick, turn, or flip of the baby.  3.  Record the time you feel the 10th movement. When you count the 10th movement, stop counting.  4.  If one hour passes with less than 10 movements, drink a large glass of fruit juice. Then count for the another hour. If you do not reach 10 movements, call the midwives    REMEMBER      The baby may move all 10 times in an hour or less.    The baby may take up to five hours to move 10 times.    The important thing is to know what is normal for your baby so you can tell your nurse-midwife when something different is happening.    CALL THE NURSE-MIDWIFE IF:      You do not feel 10 movements in five hours.    You have not felt the baby move all day.    It is taking longer and longer each day to get the 10th movement.      Pregnancy: Your Third Trimester Changes  How You Are Changing  Your body is preparing for the birth of your baby. Some of the most common changes  are listed below. If you have any questions or concerns, ask your midwife.    You ll gain more weight from fluids, extra blood, and fat deposits. Your baby will gain an average of an ounce per day (a half a pound a week)!    Your breasts will grow as your body gets ready to feed the baby. They may be more tender. You may also notice a slight yellow or white discharge from the nipples.    Discharge from your vagina may increase. This is normal.    You might see some skin color changes on your forehead, cheeks, or nose. Most of these will go away after you deliver.  How Your Baby Is Growing    Month 7  Your baby is about 14 inches long. If born prematurely (too early), your baby would likely survive with special care.   Month 8  Your baby is building up body fat. Baby kicks strongly, but is getting too big to move around much.   Month 9  Your baby weighs nearly 7 pounds and is about 18-20 inches long. In other words, any day now...       UNDERSTANDING  LABOR    Going into labor before your 37th week of pregnancy is called  labor.  labor can cause your baby to be born too soon. This can lead to a number of health problems that may affect your baby. From 28-35 weeks, Patients are advised to be evaluated at [facility] since they have a  Intensive Care Unit (NICU).  -Before labor, the cervix is thick and closed.  -In  labor, the cervix begins to efface (thin) and dilate (open) over a short period of time    Are You At Risk?  Any pregnant woman can have  labor. It may start for no reason. But these risk factors can increase your chances:    Past  labor or past early birth    Smoking and drug or alcohol use during pregnancy    Multiple fetuses (twins or more)    Problems with the shape of the uterus    Bleeding during the pregnancy    The Dangers of  Birth  A baby born too soon may have health problems. This is because the baby didn t have enough  time to mature. The baby then is at risk of:    Not breastfeeding well    Having immature lungs    Bleeding in the brain    Dying    Reaching Term  Your goal is to get as close to term as you can before giving birth. The closer you get to term, the higher your chance of having a healthy baby. Work with your healthcare provider. Together, you can take steps that may keep you from giving birth too early.    Symptoms of  Labor  If you believe you re having  labor, contact the midwives right away. But contractions alone don t mean you re in  labor. What matters more are changes in your cervix (the lower end of the uterus).   Symptoms of  labor include:    five or more contractions per hour    Strong & frequent contractions    Constant menstrual-like cramping    Low-back pain        Mucous or bloody vaginal discharge    Bleeding or spotting in the second or third trimester    Evaluating  Labor  Your midwife will try to find out whether you re in  labor or whether you re just having contractions.She may watch you for a few hours. The following tests may be done:    Pelvic exam to see if your cervix has effaced (thinned) and dilated (opened)    Uterine activity monitoring to detect contractions    Fetal monitoring to check the health of your baby    Ultrasound to check your baby s size and position    Caring for Yourself At Home  If you have contractions  but your cervix is still thick and closed, the midwife may ask you to do the following at home:    Drink plenty of water.    Do fewer activities.    Rest in bed on your side.    Avoid intercourse and nipple stimulation.    When to Call Your Midwife    Five or more contractions per hour    Bag of water breaks    Bleeding or spotting     If You Need Hospital Care   labor often requires that you have hospital care and complete bed rest. You may have an IV (intravenous) line to get fluids. And you may be given pills or  an injection to help prevent contractions. Finally, you may receive medication (corticosteroids) that helps your baby s lungs mature more quickly.    Syphilis Screening:   The [place] Department of Health ([facility]) provided new recommendations for screening during pregnancy. If you are pregnant, [facility] recommends testing three times during your pregnancy: at your first visit, 28 weeks, and after delivery.   Syphilis is:     Caused by a bacteria spread by sexual contact    Rising among [place] women of child-bearing age  Syphilis can:     Be passed on to infants during pregnancy or during delivery and can be life threatening    Be cured. There are ways to protect yourself and your babies.        HEALTHY PREGNANCY CARE: 26-30 WEEKS PREGNANT    You are now in your last trimester of pregnancy. Your baby is growing rapidly, can open and close her eyelids, sometimes get hiccups, and you'll probably feel her moving around more often. Your baby has breathing movements and is gaining about one ounce each day. You may notice heartburn and leg cramps. Your back may ache as your body gets used to your baby's size and length.    Discuss your work situation with your midwife or physician as needed. If you stand for long periods of time, you may need to make changes and take breaks.    Pre-register after 30 weeks online at the hospital where your baby will be born [email or web address]      Be aware of your baby's activity level. You may be asked to do daily fetal movement counts. Contact your midwife or physician about any decreased movement.    You may have been tested for gestational diabetes today. If you are RH negative, you may have had an additional test and a Rhogam injection.    Consider receiving a Tdap vaccination to protect your baby from Pertussis/whooping cough.    Baby Feeding in the Hospital: Information, Support and Resources    As you prepare for the birth of your child, you will want  to consider options for feeding your baby including breast-feeding and/or baby formula. The American Academy of Pediatrics recommends exclusive breast-feeding for the first six months (although any amount of breast-feeding is beneficial).  However, we also understand that breast-feeding is a personal choice and not for everyone. Whether or not you choose to breast-feed, your decision will be respected by our staff.    There are numerous benefits of breast-feeding; here are a few to consider:    Provides antibodies to protect your baby from infections and diseases    The cost: formula can cost over $1,500 per year    Convenience, no warming up or sterilizing bottles and supplies    The physical contact with breastfeeding can make babies feel secure, warm and comforted    What ever my feeding choice, what can I expect after I deliver my baby?    Your baby will usually be placed skin-to-skin immediately following birth. The skin to skin contact between you and your baby will be a special and memorable time. The bonding and attachment comforts your baby and has a positive effect on baby s brain development.     Having your baby  room in  with you also helps you start to learn your baby s body rhythms and sleep cycle.      You will also begin to learn your baby s cues (signals) that he or she is ready to feed.    When do I start to feed my baby?  As soon as possible after your baby s birth, you will be encouraged to begin feeding.  In the first couple of weeks, your baby will eat often.  Breastfeeding babies usually eat at least 8 times in 24 hours.  Babies fed formula usually eat at least 7 times in 24 hours.      Breast-feeding tips:    Get comfortable and use pillows for support.    Have your baby at the level of your breast, facing you,  tummy to tummy .      Touch your nipple to your baby s lips so you baby s mouth opens wide (rooting reflex).  Aim the nipple toward the roof of your baby s mouth. When your baby  opens his or her mouth, pull your baby toward your breast to help your baby  latch on  to your nipple and much of the areola area.    Hand expressing your breast milk can assist with latching your baby to your breast, if needed.    Ask for help, breastfeeding may seem awkward or uncomfortable at first, this is normal. There are numerous resources available at [facility] Clinics and beyond.     If your goal is to exclusively breastfeed, avoid using any formula or artificial nipples (including bottles and pacifiers) while you are your baby are learning to breastfeed unless there is a medical reason.       Mixing breastfeeding and formula can interfere with how you begin building your milk supply.  It can impact how you and your baby  learn  to breastfeeding together and alter the natural growth of  good  bacteria in your baby s stomach.    Delay a pacifier or a bottle in the first few weeks until breastfeeding is well established. This is often around 3 weeks of age.    Ask your nurse to show you how to hand express.   Breast milk can be kept in the refrigerator or freezer for later use.    Hospital and Clinic  Resources:  -Schedule an appointment with a [facility] CNM who is also a Lactation Consultant by calling [phone]     -Schedule a clinic appointment with a [facility] CNM with dedicated clinic hours for breastfeeding assistance by calling [phone]. Breastfeeding clinic visits are at [facility] on Wednesdays, [facility] on Tuesdays and [facility] on Thursdays.     [facility] Lactation Clinics located at [facility], [facility] and [facility]  Call: [phone].    Inpatient support    Outpatient appointments    Telephone consultation    Breast-feeding classes available through [employer].com        -Attend a baby weigh in at [facility].  Lactation consultants are available to answer questions  [place]: Tuesdays 1:00 - 2:00  [place]  [facility]: Mondays 1:00 - 2:00  www.[facility] CenterBroad[facility] Authority of [place](BAI)[employer]Metrics [employer].Affirmed Networks    -Attend one of the [facility] groups at [facility] in [facility].  [facility] also offers one-on-one in home and in office lactation consults.   www.[facility].[employer] Networks    -Attend a Brandon Lehman meeting.  Multiple groups in several locations throughout the [facility]. The meetings are no-cost and always informative breastfeeding education session through [employer] La Leche League  Www.madina.org/  Medication use while breastfeeding: http://toxnet.nlm.nih.gov/newtoxnet/lactmed.htm     Online Resources:    [facility].org/baby sign up for free online weekly e-mail    [facility]ast.org/maternity    Breastfeedingmadesimple.com    [employer].mobiDEOS (La Leche League)    Normalfed.com    Womenshealth.gov/breastfeeding    Workandpump.com    Breast-feeding Supplies & Pumps:  Talk to your insurance provider or WIC (Women, Infants and Children) to learn more about options available to you. Recent health insurance changes may include additional coverage for supplies and pumps.    Public Health:  Women, Infants and Children Nutrition program (WIC): provides breast-feeding support and education in addition to formal feeding moms. 643-TCB-5647 or http://www.health.[facility].[place].us/divs/fh/wic    Family Health Home Visiting: Public Health Nurse home visits are available. Talk to your provider to see if you qualify. Most [facility] have a program available.    Additional Resources:  La Leche League is an international, nonprofit, nonsectarian organization offering information, education, and support to mothers who want to breast-feed their babies. Local groups offer phone help and monthly meetings. Visit [employer] or [employer].mobiDEOS and us the  Find local support  drop down menu or click on the  Resources  tab.    [place] Breastfeeding Resources: 7-834-175-BABY (6933) toll free    National Breastfeeding Help Line trained breastfeeding peer counselors can help  answer common breast-feeding questions by phone. Monday-Friday: English/Greek  0-903- [phone] toll free, 1-272.572.6753 (TTY)    [facility] Connection: 130-171-[facility] ([phone])      Resources:    Childbirth and Parenting Education:   [place] parenting center: http://SellvanaJohn Douglas French CenterRepunch/   (391) 856-HCAC  [place]: (education, yoga & wellness) www.Vendor Registry  Enlightened Mama: www.enlightenedmama.Curious Hat   Childbirth collective: (Parent topic nights)  www.childbirthcollective.org/  Hypnobabies:  www.hypnobabiestAppsciocities.Curious Hat/  Hypnobirthing:  Http://hypnobirthing.com/    Book Recommendations:   Makayla Xavi's Birthing From Within--first few chapters include a new-age tone, you may prefer to skip it and keep going, because there is good stuff later.  This book recommendation covers emotional preparation, but does cover coping with pain, and use of both pharmacological and nonpharmacological methods.    Dr. Garcia' The Pregnancy Book and The Birth Book--the pregnancy book goes month-by month    Womanly Art of Breastfeeding by La Leche League International   Bestfeeding by Lalita Gibbs--great pictures    Mothering Your Nursing Toddler, by Vidhi Enriquez.   Addresses dealing with so many of the challenging behaviors of a nursing toddler.  How Weaning Happens, by La Leche League.  Discusses weaning at all ages, from medically necessary weaning of an infant, all the way up to age 5 (or older), with why/why not, and strategies.  Very empowering book both for deciding to wean and deciding not to.    American College of Nurse-Midwives (ACNM) http://www.midwife.org/; look at the informational handouts at http://www.midwife.org/Share-With-Women     www.mymidwife.org    Mother to Baby (Medication and Herbal guidance in pregnancy): http://www.mothertobaby.org  Toll-Free Hotline: 251.617.3700  LactMed (Medication use while breastfeeding): http://toxnet.nlm.nih.gov/newtoxnet/lactmed.htm    Women's  "Health.gov:  http://www.womenshealth.gov/a-z-topics/index.html    American pregnancy association - http://americanpregnancy.org    Centering Pregnancy (group prenatal care option): http://centeringhealthcare.org    Information about doulas:  Childbirth collective: http://www.childbirthcollective.org/  Doulas of [place] (CATHERINE):  www.catherine.org  [facility]  project: http://Talbot Holdingscitiesdoulaproject.LetsWombat/     Early Childhood and Family Education (ECFE):  ECFE offers parents hands-on learning experiences that will nourish a lifetime of teachable moments.  http://ecfe.info/ecfe-home/    March of Dimes www.Property Partner     FDA - Nutrition  www.mypyramid.gov  Under \"For Consumers,\" click on \"pregnant and breastfeeding women.\"      Centers for Disease Control and Prevention (CDC) - Vaccines : http://www.cdc.gov/vaccines/       When researching information on the web, question the validity of websites.  The domains .gov, .edu and.org tend to be more reliable information.  If there are a lot of advertisements, be cautious of the information provided. Stay away from blogs and chat rooms please!   "

## 2021-05-29 NOTE — PROGRESS NOTES
Tami is here with Colleen today.  Feeling fairly well.  Noticing pain in her right lower back that shoots down her leg, likely sciatic pain.  Has been using her pregnancy support belt and stretching which has been somewhat helpful.  Continues to go to the gym.  Questions on if she needs to limit any activities at the gym.  Explained that if it feels good and she is able to do it without discomfort, she should continue to do this.  Also maintain good hydration throughout workouts.  Occasionally she will take her blood pressure at home and at work and it has been normal.  Continues to take aspirin low-dose daily.  Completed 32-week checklist.  Feeling active fetal movement.

## 2021-05-29 NOTE — PATIENT INSTRUCTIONS - HE
[facility] Nurse Midwives - Contact information:  Appointment line and to get a hold of CNM in clinic Monday-Friday 8 am - 5 pm:  [phone].  There are some clinics with early start times (1st appointment 7:40 am) and others with evening hours (last appointment 6:20 pm).  Most are typically open from 8 am to 5 pm.    CNM on call answering service: [phone].  Specify your hospital of choice and leave a brief message for CNM;  will then page CNM who is on call at your specified hospital and you should receive a call back with 15 minutes.  Be sure that your ringer is audible and that you can accept blocked calls so that we can get back in touch with you! This number should be reserved for urgent needs if during the day, before 8 am, after 5 pm, weekends, holidays.    Contact the on-call CNM with warning signs, such as:    vaginal bleeding     Vaginal discharge and itching or pain and burning during urination    Leg/calf pain or swelling on one side    severe abdominal pain    nausea and vomiting more than 4-5 times a day, or if you are unable to keep anything down    fever more than 100.4 degrees F.        You are invited to  Meet the [facility] Nurse-Midwives  A way to tour the hospital Labor and Delivery unit and meet the midwives that attend births since you may not have the opportunity to meet them during your prenatal care.  Some sessions are informal meet and greet type social hours, others address a specific concern or topic.    Tuesday, Februrary 12, 2019 7-8pm  [facility]    Wednesday, April 17, 2019 7-8pm  [facility]    Thursday, August 15, 2019 7-8pm  [facility]    Wednesday November 13, 2019 7-8 pm  [facility]    Please call [phone] to register          Touring the Maternity Care Center  To schedule a tour at either [place] or [facility], please do so online using  "the following links:  [facility] - [email or web address]  [facility] - [email or web address]      Pre-registration for Hospital Stay:  Sometime betweeen 30-37 weeks, it is recommended that you \"pre-register\" for your upcoming hospital stay on our website:    [email or web address]    Breastfeeding and Birth Control  How do I decide what birth control method is best for me while I am breastfeeding?  Choosing a method of birth control is very personal. First, answer the following questions:      Do you want to have more children?    How much spacing between births do you want for your children?    Do you smoke or have you had any health problems, such as liver disease or a blood clot?  Talk about the answers to each of these questions with your health care provider to help you choose the best method for you.    Can I use breastfeeding as my birth control?  Using breastfeeding as your birth control (the lactational amenorrhea method) can be a good way to keep from getting pregnant in the first months after the baby is born. Each time your baby nurses, your body releases a hormone called prolactin, which stops your body from making the hormones that cause you to ovulate (release an egg). If you are not ovulating, you cannot get pregnant.    The lactational amenorrhea method works only if:    you have not started your period yet.    you are breastfeeding only and not giving your baby any other food or drink.    you are breastfeeding at least every 4 hours during the day and every 6 hours at night.    your baby is less than 6 months old.  When any 1 of these 4 things is not happening, you no longer have good protection from getting pregnant, and you should use another form of birth control.    What birth control " methods are safe for me to use while I breastfeed?    Methods without hormones  Methods without hormones do not affect you, your baby, or your breastfeeding.  Methods without hormones that are the most effective    The copper intrauterine contraceptive device (IUD) (ParaGard) is a small, T-shaped device that is in- serted into your uterus (womb) through the vagina and cervix. The copper IUD lasts for 10 years.    Sterilization (getting your tubes tied or your partner having a vasectomy) is very effective, but it is per- manent. You should choose sterilization only if you do not want to have more children.  A method without hormones that is effective    The lactational amenorrhea method described above is effective for the first 6 months.  Methods without hormones that are less effective    Natural family planning is monitoring your body for signs of ovulation and not having sex when you think you are ovulating. This method is reliable only if you are having regular periods every month.    Barrier methods (condoms, diaphragms, sponges, and spermicides) are used at the time you have sex. These methods are effective only if you use them correctly every time.    Methods with hormones  Birth control methods that use hormones can be used while you are breastfeeding. They may have a small effect on lowering the amount of milk you make. All hormones will get into your breast milk in very small amounts, but there is no known harm to your baby from this small amount of hormone in breast milk.only methodsmethods use only 1 hormone, called progestin. You can start them right after your baby is born or wait 4 to 6 weeks to make sure your milk supply is good.     Progestin-only pills ( minipills ): If you like to take pills every day, you can use the minipill. In order forpill to work well, you have to take 1 at the same time each day. When you stop breastfeeding, you should start pills that have both estrogen and progestin  because they are better at keeping you from get- ting pregnant.     Progestin IUD (Mirena): The progestin IUD is shaped and inserted into the uterus like the copper IUD. It works for up to 5 years. Both IUDs are usually inserted 4 to 6 weeks after the baby is born.    Progestin implant (Nexplanon): The progestin implant is a small matchstick-sized flexible gigi. It is placed into the fatty tissue in the back of your arm. It works for up to 3 years.    Progestin shot (Depo-Provera): The progestin shot is given every 3 months.    estrogen and progestin methods  These methods use 2 hormones, called estrogen and progestin.     These methods increase your risk of a blood clot, which is already higher than normal after you have a baby. You should not use them until your baby is at least 6 weeks old. The combined methods are not recommended as the first choice for women who are breastfeeding. If a combined method is the one that you feel will be best for you to prevent getting pregnant, these methods are okay to use while breastfeeding.     Combined birth control pills: You take a pill each day.    Vaginal ring (NuvaRing): The ring is worn in the vagina for 3 weeks then left out for 1 week before youin a new ring.    Patch (Ortho Evra): The patch is placed on your skin and changed every week for 3 weeks then left off forweek before putting a new patch on a different area of your skin.    Pediatric Care Providers at [facility]:    Choosing the right provider is one of the most important decisions you ll make about your health care. We can help you find the right one. Remember, you re looking for a provider you can trust and work with to improve your health and well-being, so take time to think about what you need. Depending on how complicated your health care needs are, you may need to see more than one type of provider.    Primary Care Providers: You ll see a primary care provider first for most health issues. They ll work  with you to get your recommended screenings, help you manage chronic conditions, and refer you to other types of providers if you need them. Your primary care provider may be called a family physician or doctor, internist, general practitioner, nurse practitioner, or physician s assistant. Your child or teenager s provider may be called a pediatrician.  Specialists: You ll see a specialist for certain services or to treat specific conditions. Specialists include cardiologists, oncologists, psychologists, allergists, podiatrists, and orthopedists. You may need a referral from your primary care provider before you go to a specialist in order for your health plan to pay for your visit.\pardHere are some tips for finding a provider where you live:  If you already have a provider you like and want to keep working with, call their office and ask if they accept your coverage.  Call your insurance company or state Medicaid and CHIP program. Look at their website or check your member handbook to find providers in your network who take your health coverage.  Ask your friends or family if they have providers they like and use these tools to compare health care providers in your area.    Family Medicine at Health system: https://www.[facility].org/clinics/family-medicine.html    Many of our families enjoy all seeing the same doctor, who comes to know the whole family very well. We base our practice on the knowledge of the patient in the context of family and community.  WHY CHOOSE A FAMILY MEDICINE PHYSICIAN?  Ability of the whole family to see the same doctor  Focus on the whole person, including physical and emotional health  A personal relationship with their doctor that is nurtured over time  Respect for individual and family beliefs and values  No need to change primary care providers when a certain age is reached  Coordination of care when other health care services are needed    Pediatrics at Health system:    Https://www.[email or web address]/[email or web address].html    Through a teaching affiliation with the [facility], [facility] staff keeps current on new developments in the field of pediatrics.     Everything we do centers around caring for children. We place special emphasis on wellness and prevention.pediatric care team includes a team of pediatricians and certified nurse practitioners who provide care to pediatric and adolescent patients ages 0 to 18, and some up to the age of 26. We offer preventive health maintenance for healthy children as well the diagnosis and treatment of common and chronic illnesses and injuries. In addition, we also offer several pediatric specialists who focus on adolescent health issues and developmental and behavioral issues.    Circumcision  What is circumcision?  At birth, baby boys have loose skin that covers the head of the penis. This skin is called the foreskin. When all or part of the foreskin of the penis is cut off, this is called circumcision.  Why is circumcision done?  Circumcision is done for many reasons including Pentecostal, cultural, looks, and health. Some Pentecostal groups circumcise all boys as a desmond-based practice. Many people in the [place] choose to circumcise their baby boys because they believe it is culturally normal. It is not a common practice in [place], [place], or [place].  Some parents choose circumcision so that their son will have a penis that looks like his father s if the father was also circumcised. Other people choose circumcision because they believe it is  or will protect the boy or man from infection or cancer later in life.  Does circumcision protect against infection or cancer?  Circumcision does seem to protect against some types of infection or cancer. Cancer of the penis is one type of cancer that circumcision may prevent. However, cancer of the penis is very rare. One hundred thousand circumcisions would need to  be done to prevent one case of cancer of the penis. Circumcision may also decrease the chance of some sexually transmitted infections, such as HIV and human papilloma virus (HPV). See the next page for more information on the risks and benefits of circumcision.  What happens during a circumcision?  Babies born in the hospital are usually circumcised before they go home. Health care providers also perform circumcisions in their offices and clinics within a few weeks after birth.  Latter day circumcisions are most often done at home or in a Anabaptism.  Before the circumcision is performed, some providers give an injection (shot) of a small amount of anesthetic (numbing medicine) at the base of the penis to block the pain or put an anesthetic cream on the penis to numb the area that will be cut.  There are 2 different ways to do a circumcision. In one type, a clamp placed around the head of the penis cuts off the blood supply to the foreskin, and the foreskin above the clamp is cut off. The clamp is left on the penis until the area heals and it falls off a few days later. In another type of circumcision, the foreskin is cut off with scissors or a scalpel.  After the circumcision, petroleum jelly and sometimes gauze may be put over the area of the penis where the skin was removed. This protects the end of the penis while it heals.  Can I keep my son s penis  if it is circumcised?  Regular washing with soap and water will keep any penis clean. Circumcision does not make the penis . Uncircumcised boys do need to be taught to clean beneath their foreskin, just like they need to be taught to wash their hands or brush their teeth.  How do I decide if I should have my son circumcised?  The American Academy of Pediatrics (AAP) says that  circumcision may have health benefits. They do not recommend circumcision for all boys as a routine procedure. The AAP recommends that you talk to your health care provider  to decide if circumcision is the right choice for your family. You may also wish to discuss the question with your family or .  What are the risks and benefits of circumcision?  We do not have a lot of good scientific information about the health risks or benefits of circumcision.  Possible Risks:  Very few baby boys (less than 1 in 100) will have a problem after circumcision, such as bleeding or mild infection of the penis. These problems are usually not serious and are easy to treat.  Less common problems are:    Removal of too much or too little foreskin  Some rare problems are:    Narrowing of the opening of the penis, which can cause problems with urination    Removal of part of the penis or death of some of the other skin on the penis   Infection that spreads to other parts of the body  People used to think babies did not really feel pain. Now we know that they do. Many baby boys appear to feel a lot of pain during circumcision if anesthesia is not used.  We do not know if circumcision affects sexual function or sensation.  Possible Benefits:    Less risk for some kinds of cancers, like cancer of the penis    Fewer urinary tract (bladder or kidney) infections for babies    Less risk for some sexually transmitted infections, such as HIV, herpes, and HPV     May protect female sexual partners from some sexually transmitted infections    For More Information  American Academy of Family Physicians: Circumcision  http://familydoctor.org/familydoctor/en/pregnancy-newborns/caring-for-newborns/infant- care/circumcision.html  MedlinePlus: Circumcision (includes a slide show on the procedure)  www.nlm.nih.gov/medlineplus/circumcision.html  American Academy of Pediatrics: Policy statement on circumcision  Http://pediatrics.aappublications.org/content/130/3/e756.abstract      Childbirth and Parenting Education:   [facility] center: http://[employer]/   [phone]  [place]: (education,  yoga & wellness) www.[employer]Evals  Enlightened Mama: www.enlightenedmama.com   Childbirth collective: (Parent topic nights)  www.childbirthcollective.org/  Hypnobabies:  www.hypnobabiestwincities.com/  Hypnobirthing:  Http://hypnobirthing.com/    Book Recommendations:   Makayla Limestone's Birthing From Within--first few chapters include a new-age tone, you may prefer to skip it and keep going, because there is good stuff later.  This book recommendation covers emotional preparation, but does cover coping with pain, and use of both pharmacological and nonpharmacological methods.    Dr. Garcia' The Pregnancy Book and The Birth Book--the pregnancy book goes month-by month    Womanly Art of Breastfeeding by La Leche League International   Bestfeeding by Lalita Gibbs--great pictures    Mothering Your Nursing Toddler, by Vidhi Enriquez.   Addresses dealing with so many of the challenging behaviors of a nursing toddler.  How Weaning Happens, by La Leche League.  Discusses weaning at all ages, from medically necessary weaning of an infant, all the way up to age 5 (or older), with why/why not, and strategies.  Very empowering book both for deciding to wean and deciding not to.    American College of Nurse-Midwives (ACNM) http://www.midwife.org/; look at the informational handouts at http://www.midwife.org/Share-With-Women     www.mymidwife.org    Mother to Baby (Medication and Herbal guidance in pregnancy): http://www.mothertobaby.org  Toll-Free Hotline: 419.847.7608  LactMed (Medication use while breastfeeding): http://toxnet.nlm.nih.gov/newtoxnet/lactmed.htm    Women's Health.gov:  http://www.womenshealth.gov/a-z-topics/index.html    American pregnancy association - http://americanpregnancy.org    Centering Pregnancy (group prenatal care option): http://centeringhealthcare.org    Information about doulas:  Childbirth collective: http://www.childbirthcollective.org/  Doulas of [place] (CATHERINE):  www.catherine.org  [facility]  " project: http://Zoodaktiesdoulaproject.com/     Early Childhood and Family Education (ECFE):  ECFE offers parents hands-on learning experiences that will nourish a lifetime of teachable moments.  http://ecfe.info/ecfe-home/    March of Dimes www.Grocery Shopping Network     FDA - Nutrition  www.mypyramid.gov  Under \"For Consumers,\" click on \"pregnant and breastfeeding women.\"      Centers for Disease Control and Prevention (CDC) - Vaccines : http://www.cdc.gov/vaccines/       When researching information on the web, question the validity of websites.  [employer] .gov, .edu and.org tend to be more reliable information.  If there are a lot of advertisements, be cautious of the information provided. Stay away from blogs and chat rooms please!   "

## 2021-05-30 NOTE — PATIENT INSTRUCTIONS - HE
[facility] Nurse Midwives - Contact information:  Appointment line and to get a hold of CNM in clinic Monday-Friday 8 am - 5 pm:  [phone].  There are some clinics with early start times (1st appointment 7:40 am) and others with evening hours (last appointment 6:20 pm).  Most are typically open from 8 am to 5 pm.    CNM on call answering service: [phone].  Specify your hospital of choice and leave a brief message for CNM;  will then page CNM who is on call at your specified hospital and you should receive a call back with 15 minutes.  Be sure that your ringer is audible and that you can accept blocked calls so that we can get back in touch with you! This number should be reserved for urgent needs if during the day, before 8 am, after 5 pm, weekends, holidays.    Contact the on-call CNM with warning signs, such as:    vaginal bleeding     Vaginal discharge and itching or pain and burning during urination    Leg/calf pain or swelling on one side    severe abdominal pain    nausea and vomiting more than 4-5 times a day, or if you are unable to keep anything down    fever more than 100.4 degrees F.          You are invited to  Meet the [facility] Nurse-Midwives  A way to tour the hospital Labor and Delivery unit and meet the midwives that attend births since you may not have the opportunity to meet them during your prenatal care.  Some sessions are informal meet and greet type social hours, others address a specific concern or topic.    Tuesday, Februrary 12, 2019 7-8pm  [facility]    Wednesday, April 17, 2019 7-8pm  [facility], [facility]    Thursday, August 15, 2019 7-8pm  [facility]    Wednesday November 13, 2019 7-8 pm  [facility], [facility]    Please call [phone] to register        Touring the Maternity Care Center  To schedule a tour at either [facility] or [facility], please do so online using  the following links:  [facility] - [email or web address]  [facility] - [email or web address]      Car Seat Clinics:  https://[place]ps.[place].gov/divisions/ots/child-passenger-safety/Pages/car-seat-checks.aspx  [facility]    Free Car Seat Distribution Facilities     By Appt. Address Contact Information (For appointment)      \Yes [employer]\[address]\[place],\cell Jerrica Hester)-\[email or web address]      Yes [facility]\ [facility]\[place],\cell Maddie De Dios)[phone]\[email or web address]      Yes [facility]\[address] [facility]\[place],\cell Kay Henning)[phone]\[email or web address]      Immunizations:  http://www.cdc.gov/vaccines/schedules/easy-to-read/child.html      Postpartum Depression  The first weeks of caring for a  baby are more than a full-time job. Although it is often a happy time, your feelings and moods may not be what you expected. Many women experience  baby blues.  Here are some tips to help you understand when feelings of sadness are normal, and when you should call your health care provider.    What are the baby blues?  As many as 3 of every 4 women will have short periods of mood swings, crying, or feeling cranky or restless during the first weeks after birth. These feelings can be worse when you are tired or anxious. Women who have the baby blues often say they feel like crying but don t know why. Baby blues usually happen in the first or second week postpartum (after you give birth) and last less than a week. If you are not sleeping, becoming more upset, don t feel like you can take care of your baby, or your sadness lasts 2 weeks or more, call your health care provider.    What is postpartum  depression?  About one in every 5 women will develop depression during the first few months postpartum that may be mild, moderate, or severe. Women who have postpartum depression may have some of these symptoms:    Feeling guilty     Not able to enjoy your baby and feeling like you are not bonding with your baby      Not able to sleep, even when the baby is sleeping    Sleeping too much and feeling too tired to get out of bed    Feeling overwhelmed and not able to do what you need to during the day    Not able to concentrate    Don t feel like eating    Feeling like you are not normal or not yourself anymore    Not able to make decisions    Feeling like a failure as a mother    Feeling lonely or all alone    Thinking your baby might be better off without you  If you have any of these symptoms, call your health care provider!    Which symptoms of postpartum depression are dangerous?  Sometimes a woman with postpartum depression will have thoughts of harming herself or her baby. If you find yourself thinking about hurting yourself or your baby, call your health care provider immediately.    MOTHERHOOD: THE EARLY DAYS  You prepare for the birth of your baby for many months during pregnancy, and then the first months at home after your baby is born can be a quiet, gentle time of getting to know this new person who has come to live in your home. But for most women it is not all quiet or sweet. And for some women it is a very hard time.  What Can I Expect in the First Few Months After the Baby Comes?  New mothers and their families face many challenges in the first few months:    Your body and your hormones have to get back to normal.    You and the baby will be learning to breastfeed.    Babies only sleep a few hours at a time. The entire family will have a hard time getting enough sleep.    You and your family need to learn how to parent this new family member.    If you have a partner, you have to figure out how to  stay together as a couple and maybe even start to have sex again.    You may have to figure out how to keep from getting pregnant again right away.    You may need to return to work and find day care.    How Long Will it Take for My Body to Get Back to Normal?  Some changes will occur quickly. Others will not occur as quickly.    Your uterus, cervix, and vagina will all shrink to their nonpregnant size in about 2 weeks. Your vagina may be tender and dry for a few months--especially if you are breastfeeding.    If you had stitches or hemorrhoids, your   bottom   will be sore for 2 weeks or more.    For some women who have problems urinating, it can take several months for you to be able to hold your urine when you cough or sneeze or suddenly  something heavy.    Your breast milk will   come in   2 to 3 days after the birth of your baby. It will take 6 to 8 weeks for you and the baby to get the hang of breastfeeding and find a pattern. During these first weeks, you can have engorged breasts at times and often leak milk.    Your stomach and intestines all have to fall back into place. You may have a lot of gas for a few weeks.    You may be constipated--especially if you are breastfeeding.    Your stretched stomach muscles can recover in a few weeks, but for some women it takes longer--6 months or a year--to recover.    If you had a  delivery, you may have pain or numbness around the incision for 6 months or more.    Losing the weight you gained during pregnancy will probably take 6 months to a year. Have patience! It took 40 weeks to get here. Give yourself 40 weeks to get back.    What Can I Expect When My Hormones Change?  About 75% of all women will get the   blues.   This usually starts about 3 days after the birth of your baby. You may cry easily and feel very, very tired. A few women become very depressed. If you had a  delivery or your new baby was sick, you are at a higher risk for  depression.  Call your health care provider right away if you cannot care for yourself or your baby, if you feel very nervous or worried, if you cannot stop crying, or if you are having thoughts of hurting yourself or your baby.    Taking Care of Yourself  While you are still pregnant:    Talk with your partner and your family about the time ahead. Arrange for someone to help you during the first weeks at home if you can.    Talk with your health care provider about birth control options and make a plan before the baby comes.    If you are worried about how to parent a , take parenting classes. You will learn a lot about how babies act and you will make some friends who are going through the same thing at the same time. Most [facility] have these classes.    Arrange for someone to help with baby care if you can.  After the baby comes:    Ask for help. Let other people do the cooking and cleaning and run the house. Focus on yourself and your baby.    Sleep whenever you can. Try not to be tempted to   get some things done   when the baby sleeps. This is your time to sleep, too.    Drink lots of water. You will need at least 6 big glasses of water everyday to avoid constipation and make enough breast milk. Every time you sit down to breastfeed, have a big glass of water with you to drink while you are nursing.    Eat lots of vegetables and fruit. You will need lots of vitamins and fiber to help your body get back to normal. This will also help you avoid constipation.    Go outside and walk. Babies can go outside even if it is very cold. Fresh air and sunshine will do you both good.    Take sitz baths. Put about 6 inches of warm water in your bathtub and sit in there for 15 minutes 2 to 3 times a day. This will help your   bottom   heal more quickly. It will also give you 15 minutes of private time!    Talk to other mothers. Join a new parents group. Call Sayra and go to [facility] meetings if you are  breastfeeding.     With your partner:    Keep talking. Share the experience.    Spend time alone. Even a 30-minute walk can be a date.    Start a birth control method. You can get pregnant before you even have a period. It is very important to use birth control if you do not want to get pregnant again right away.    When you have sex, use a lubricant. A lot of lubricant! Take it slow.  The first few months after a baby comes can be a lot like floating in a jar of honey--very sweet and munson, but very sticky, too. Take time to enjoy the good parts. Remind yourself that this time will pass. Bon voyage!    FOR MORE INFORMATION  For questions about depression during and after pregnancy:  http://www.womenshealth.gov/publications/our-publications/fact-sheet/depression-pregnancy.html   After birth: The first 6 weeks:  http://[email or web address]/Post-Birth-and-Recovery   Breastfeeding resources:  http://www.[employer].org/health-info/getting-breastfeeding-off-to-a-good-start/    HEALTHY PREGNANCY CARE: 37 to 41 WEEKS PREGNANT    Talk with your midwife or physician about when to call with signs of labor    Regular uterine contractions that are getting closer together and/or stronger    If you think your water has broken or is leaking    Bleeding from the vagina like a period (bloody vaginal discharge is normal)    If you are not feeling your baby move    Make plans for transportation and  as needed for when you are going to the hospital.    Your midwife or physician may offer to check your cervix for changes.     Ask your health care provider about vaccinations you may need following delivery. By now, you should have received a Tdap immunization to protect against pertussis or whooping cough. Fathers and family members who will be in close contact with the baby should also receive a Tdap shot at least two weeks before the expected birth of the baby if they have not had a Td (tetanus) shot for at least  two years.    If you are past your due date, discuss the next steps leading to delivery with your midwife or physician. If you don't start labor on your own by 41 or 42 weeks, your midwife or physician may recommend giving you medicines to ripen your cervix and start labor.    Preparing for your baby: Tell your midwife or physician how you plan to feed your baby (breast or bottle), who you have chosen to do pediatric care for your baby, and if you have a boy, whether you have chosen to have him circumcised. You will need a car seat correctly installed in your vehicle to bring your baby home. As you start to set up the nursery at home for your baby, make sure the crib is safe. The mattress needs to fit snugly against the edges of the crib. If you can fit a soda can between the bars, they are too far apart and can allow the baby's head to caught between them.    Learn about infant care and feeding, including information about infant CPR. We recommend that you put your baby to sleep on his or her back to reduce the chance of Sudden Infant Death Syndrome (SIDS). To maintain a healthy environment in which your child can grow, it's best to keep your home smoke-free. By preparing ahead, your transition into parenthood will go smoothly for you and your baby.    Your midwife or physician will want to see you for a checkup 2 to 6 weeks after delivery.    If you have questions about any symptoms you are experiencing or any other concerns, call your provider or their clinic staff at [facility] at Phone: [phone]. If it's after clinic hours, physician patients should call the [facility] at [phone] ([phone]); midwife patients should call their answering service at [phone].    How can you care for yourself at home?   You can refer to the Starting Out Right book or find it online at http://www.[facility].org/images/stories/maternity/[facility]-Starting-Out-Right.pdf or  http://www.[facility].org/images/stories/flipbooks/[facility]-starting-out-right/[facility]-starting-out-right.html#p=8     You can sign up for a weekly parenting e-mail that gives support, tips and advice from health care professionals that starts with pregnancy and continues through the toddler years. To register, go to [email or web address].[email or web address].[email or web address]/baby at any time during your pregnancy.        Making Plans for Feeding My Baby    By this point, you probably have read a lot about feeding your baby.  Breastfeed or formula? Each mother s decision is her own and [facility] respects you and your choices. We ve gathered information on both breastfeeding and formula feeding to help with your decision. Talking with your physician or nurse-midwife can also help in your decision.  However you plan to feed your baby, [facility] encourage rooming in with your baby, skin-to-skin contact and feeding your baby based on his or her cues.    Skin-to-skin contact  Being close to mom helps your baby adjust to life outside of the womb.  It helps your baby regulate their body temperature, heart rate, and breathing.  Your baby will usually be placed skin-to-skin immediately following birth or as soon as possible, if medical intervention is needed.    Rooming-In  Having your baby stay with you in your room is called  rooming-in .  Keeping your baby in your room helps you to learn how to care for your baby by getting to know your baby s cues, body rhythms and sleep cycle.       Cue-based feeding  Cues (signals) are baby s way of telling you what he or she wants.  When you learn your infant s cues, you know how to care for and feed your baby.   Feeding cues are the licking and smacking of lips, bringing their fist to their mouth, and a reflex called  rooting - where baby turns and opens his or her mouth, searching for the breast or bottle.  Crying is a late feeding cue.  Babies can feed frequently, often at least 8 times in 24  hours.  Breastfeeding facts  Breast milk is the best source of nutrition for your baby and is available at birth. In the first couple of days, your milk volume is already starting to increase, though it may not be noticeable. Breastfeed frequently to increase your milk supply. Within three to five days, you will begin to notice larger milk volumes. An increase in breast size, heaviness and firmness are often described as the milk  coming in.  Frequent breastfeeding can help breasts from getting overly firm and painful. You will know the baby is getting enough milk if your baby is having wet and dirty diapers and gaining weight.     If your goal is to exclusively breastfeed, it is important to not use any formula or artificial nipples (including bottles and pacifiers) while your baby is learning to breastfeed.  While it may seem like an  easy  option to give your baby a bottle, formula should only be given if there is a medical reason for your baby to have it.    Positioning and attachment   Get comfortable.  Use pillows as needed to support your arms and baby.  Hold baby close at the level of your breast, facing you in a tummy to tummy position.  Skin to skin helps with this.  Position the baby with his or her nose by the nipple.  There should be a straight line from baby s ear to shoulder to hips.  Tickle your baby s lips or wait for baby to open mouth wide, bring baby to breast by leading with the chin.  Aim the nipple at the roof of baby s mouth.  A rapid sucking pattern is followed by longer, drawing pattern with occasional swallows heard.  When baby is correctly latched, your nipple and much of the areola are pulled well into baby s mouth.      Returning to work or school  Focus on a good start to breastfeeding.  Many women continue to provide breastmilk for their baby when they return to work or school.  Making plans about where to pump and store milk can make the transition go well.  Talk with other mothers  who have also returned to work or school for tips and support.  Your employer s Human Resource department may be a resource as well.     Returning to work or school: (continued)     babies can mean fewer  sick  days for you.    A quality breast pump will also save time and add comfort.  Check with your insurance prior to giving birth for breast pump coverage.  Many insurance companies include a pump within your benefits.    Wait until your baby is at least three weeks old to introduce a bottle for the first time.  Have someone besides you give the bottle.    Breastfeed when you are with your baby. Reserve your bottles of breast milk for when you are away.     Your breasts will need to be  emptied  either by your baby or a pump.  Plan to pump at least twice in an eight hour day.    If you cannot pump at work, continue breastfeeding at home. Any amount of breast milk is worth giving to your baby.    Formula feeding facts  If you are planning to use formula to feed your baby, you will want to make some preparations ahead of time. Talk to your doctor or nurse-midwife about what type of formula to use. Some are iron-fortified, meaning they have extra iron in them. You will want to purchase formula and bottles before your baby is born to be sure you are ready after you return from the hospital. [facility] do not provide formula samples to take home.    Be sure to follow formula mixing directions closely. Regular milk in the dairy case at the grocery store should not be given to babies under 1 year old. Baby formula is sold in several forms including:    Ready-to-use. This is the most expensive, but no mixing is necessary.    Concentrated liquid. This is less expensive than ready-to-use and you mix with water.    Powder. This is the least expensive. You mix one level scoop of powdered formula with two ounces of water and stir well.    Most babies need 2.5 ounces of formula per pound of body weight  each day. This means an 8-pound baby may drink about 20 ounces of formula a day; however, this is just an estimate. The most important thing is to pay attention to your baby s cues.  If your baby is always fussy, needs more iron or has certain food allergies, your physician may suggest you change your baby s formula to a different kind.     How do I warm my baby s bottles?  You may feed your baby a bottle without warming it first. It is OK for the breast milk or formula to be cool or room temperature. If your baby seems to prefer it warmed, you can put the filled bottle in a container of warm water and let it stand for a few minutes. Check the temperature of the liquid on your skin before feeding it to your baby; to be sure it isn t too hot. Do not heat bottles in the microwave. Microwaves heat food and liquids unevenly, and this can cause hot spots that can burn your baby.    How do I clean and sterilize bottles?  Sterilize bottles and nipples before you use them for the first time. You can do this by putting them in boiling water for 5 minutes. After that first time, you can wash them in hot and soapy water. Rinse them carefully to be sure there is no soap left on them. You can also wash them in the .    [facility] [phone] ([phone])    Share with Women\Suzy I in Labor?  What is labor? Labor is the work that your body does to birth your baby. Your uterus (the womb) contracts(tightens). The contractions(labor pains) push your baby down onto your cervix(the opening ofyour uterus). Thispressure causesyour cervix to open. When your cervix iscompletely open (10 centimeters dilated), you will push your baby through your vagina and out into the world.  What do contractions feel like? When contractionsfirst start, theyusually feellike cramps duringyour period. Sometimesyoufeelpain in your back. Mostoften,contractions feel like muscles pulling painfully in your lower belly. At first, the contractions will  probably be 15 to 20 minutes apart.They maybe irregular and will not feel too painful. As labor goes on, the contractionsget stronger,closer together, more consistent, and more painful.  How do I time the contractions? When the contractionsseem to be coming regularly, youshould start to time them.You time your contractions by counting the number of minutes from the start of one contraction to the start of the next contraction.  What should I do during early labor when the contractions start? If it is night andyoucan sleep, do so. If it happensduring the day, there are some things you can do to take care of yourself at home: Walk. If the painsyou are having are reallabor, walking will makethecontractionscome closer together and they will be stronger,but you will be able to cope with them better if you are standing or moving around. If the contractions are early labor ones that come andgo (sometimes called false labor), walkingcan make them go away. Take a shower or bath. This will help you relax. Eat. Labor is a big event.Your body needs a lot of energy to be effective.Eat whatever you feel like eating. Drink water. Not drinking enough water can cause contractions to not be as effectiveas theyshould be.You need to be well hydrated (drinking enoughwater) to help your body work well during labor. Take a na p. If youfeel tired, lay down on your side and get all the rest you can. It helps to be rested when you go intoactive labor. Do something you enjoy. Spend time with family. Watch a movie. Distraction will help you relax. Get a massage. If your labor is in your back, a strong massage on your lower back may feel very good. Getting a foot massage or having a partner rub your feet can also be very relaxing. Don t panic. You can do this. Your body was made for this. You are strong!  When should I call my health care provider if I think I am in labor? Your contractions have been 5 minutes or less apart for at least an  hour. Your contractions are becoming so painful youcannot walk or talk during one. You think your amniotic sac (bag of fox) breaks. You may have a big gush of amniotic fluid (water) or just fluid that runs down your legs when you walk or move or change position.  Are there other reasons to call my health care provider? If you are concerned about anything, don t hesitate to call your health care provider.You should definitely call your health care provider or go to the hospital if:  It is 3 weeks or more before your due date, and you are having contractions.  You have vaginal bleeding that is more than your period, soaks your underwear, or runs down your legs.  You have sudden severe pain that does not go away with rest.  Your baby has not movedfor several hours.  You are leaking greenish fluid.    For More Information: http://onlinelibrary.lan.com/doi/10.1111/jmwh.63575/epdf     US Department of Health and Human Services: Signs oflabor,labor stages, and types of birth  http://womenshealth.gov/pregnancy/childbirth-beyond/labor-birth.html#a      Childbirth and Parenting Education:   [place] parenting center: http://ABBYY Language Services/   ([phone]-BABY  [place]: (education, yoga & wellness) www.Vee24  Enlightened Mama: www.enlightenedCoupz.SnackFeed   Childbirth collective: (Parent topic nights)  www.childbirthcollective.org/  Hypnobabies:  www.hypnobabiestwincities.com/  Hypnobirthing:  Http://hypnobirthing.com/    Book Recommendations:   Makayla [place]'s Birthing From Within--first few chapters include a new-age tone, you may prefer to skip it and keep going, because there is good stuff later.  This book recommendation covers emotional preparation, but does cover coping with pain, and use of both pharmacological and nonpharmacological methods.    Dr. Garcia' The Pregnancy Book and The Birth Book--the pregnancy book goes month-by month    Womanly Art of Breastfeeding by La Leche League International   Bestfeeding by  "Lalita Gibbs--great pictures    Mothering Your Nursing Toddler, by Vidhi Enriquez.   Addresses dealing with so many of the challenging behaviors of a nursing toddler.  How Weaning Happens, by La Leche League.  Discusses weaning at all ages, from medically necessary weaning of an infant, all the way up to age 5 (or older), with why/why not, and strategies.  Very empowering book both for deciding to wean and deciding not to.    American College of Nurse-Midwives (ACNM) http://www.midwife.org/; look at the informational handouts at http://www.midwife.org/Share-With-Women     www.mymidwife.org    Mother to Baby (Medication and Herbal guidance in pregnancy): http://www.mothertobaby.org  Toll-Free Hotline: 199.337.8522  LactMed (Medication use while breastfeeding): http://toxnet.nlm.nih.gov/newtoxnet/lactmed.htm    Women's Health.gov:  http://www.womenshealth.gov/a-z-topics/index.html    American pregnancy association - http://americanpregnancy.org    Centering Pregnancy (group prenatal care option): http://centeringhealthcare.org    Information about doulas:  Childbirth collective: http://www.childbirthcollective.org/  Doulas of [place] (CATHERINE):  www.catherine.org  [facility]  project: http://twincitiesdoulaproject.com/     Early Childhood and Family Education (ECFE):  ECFE offers parents hands-on learning experiences that will nourish a lifetime of teachable moments.  http://ecfe.info/ecfe-home/    March of Dimes www.marchGreenPoint PartnersdiAirway Therapeutics.com     FDA - Nutrition  www.mypyramid.gov  Under \"For Consumers,\" click on \"pregnant and breastfeeding women.\"      Centers for Disease Control and Prevention (CDC) - Vaccines : http://www.cdc.gov/vaccines/       When researching information on the web, question the validity of websites.  The domains .gov, .edu and.org tend to be more reliable information.  If there are a lot of advertisements, be cautious of the information provided. Stay away from blogs and chat rooms please!   "

## 2021-05-30 NOTE — PROGRESS NOTES
Tami is here with Colleen today.  Feeling fairly well.  Still having sciatic pain, but no better and no worse.  Continues to exercise almost daily at the gym.  Taking Tums for heartburn.  Notes some ankle swelling throughout the day, but wears compression stocking which is helpful.  Is feeling active fetal movement and no signs or symptoms of preeclampsia or  labor.

## 2021-05-30 NOTE — PROGRESS NOTES
Tami is here alone today.  She is feeling well, however fatigued.  She continues to go to the gym and swim.  She has had increased back pain over the weekend, but she attributes it to doing more activity.  Stretching and resting seems to help that.  Discussed when to call and come into the hospital in relation to labor.  Continues to take low-dose aspirin daily due to history of preeclampsia.  Denies any signs or symptoms of high blood pressure.  Occasionally takes her blood pressure at work, and it has never been above the threshold for hypertension.  Normal BP today.  Feeling active fetal movement.  GBS, hemoglobin, SVE today.

## 2021-05-30 NOTE — PATIENT INSTRUCTIONS - HE
[facility] Nurse Midwives - Contact information:  Appointment line and to get a hold of CNM in clinic Monday-Friday 8 am - 5 pm:  [phone].  There are some clinics with early start times (1st appointment 7:40 am) and others with evening hours (last appointment 6:20 pm).  Most are typically open from 8 am to 5 pm.    CNM on call answering service: [phone].  Specify your hospital of choice and leave a brief message for CNM;  will then page CNM who is on call at your specified hospital and you should receive a call back with 15 minutes.  Be sure that your ringer is audible and that you can accept blocked calls so that we can get back in touch with you! This number should be reserved for urgent needs if during the day, before 8 am, after 5 pm, weekends, holidays.    Contact the on-call CNM with warning signs, such as:    vaginal bleeding     Vaginal discharge and itching or pain and burning during urination    Leg/calf pain or swelling on one side    severe abdominal pain    nausea and vomiting more than 4-5 times a day, or if you are unable to keep anything down    fever more than 100.4 degrees F.        You are invited to  Meet the [facility] Nurse-Midwives  A way to tour the hospital Labor and Delivery unit and meet the midwives that attend births since you may not have the opportunity to meet them during your prenatal care.  Some sessions are informal meet and greet type social hours, others address a specific concern or topic.    Tuesday, Februrary 12, 2019 7-8pm  [facility]    Wednesday, April 17, 2019 7-8pm  [facility], [facility]    Thursday, August 15, 2019 7-8pm  [facility]    Wednesday November 13, 2019 7-8 pm  [facility], [facility]    Please call [phone] to register          Touring the Maternity Care Center  To schedule a tour at either [place] or [facility], please do so online using  "the following links:  [facility] - [email or web address]  [facility] - [email or web address]      Pre-registration for Hospital Stay:  Sometime betweeen 30-37 weeks, it is recommended that you \"pre-register\" for your upcoming hospital stay on our website:    [email or web address]    Breastfeeding and Birth Control  How do I decide what birth control method is best for me while I am breastfeeding?  Choosing a method of birth control is very personal. First, answer the following questions:      Do you want to have more children?    How much spacing between births do you want for your children?    Do you smoke or have you had any health problems, such as liver disease or a blood clot?  Talk about the answers to each of these questions with your health care provider to help you choose the best method for you.    Can I use breastfeeding as my birth control?  Using breastfeeding as your birth control (the lactational amenorrhea method) can be a good way to keep from getting pregnant in the first months after the baby is born. Each time your baby nurses, your body releases a hormone called prolactin, which stops your body from making the hormones that cause you to ovulate (release an egg). If you are not ovulating, you cannot get pregnant.    The lactational amenorrhea method works only if:    you have not started your period yet.    you are breastfeeding only and not giving your baby any other food or drink.    you are breastfeeding at least every 4 hours during the day and every 6 hours at night.    your baby is less than 6 months old.  When any 1 of these 4 things is not happening, you no longer have good protection from getting pregnant, and you should use another form of birth control.    What birth control " methods are safe for me to use while I breastfeed?    Methods without hormones  Methods without hormones do not affect you, your baby, or your breastfeeding.  Methods without hormones that are the most effective    The copper intrauterine contraceptive device (IUD) (ParaGard) is a small, T-shaped device that is in- serted into your uterus (womb) through the vagina and cervix. The copper IUD lasts for 10 years.    Sterilization (getting your tubes tied or your partner having a vasectomy) is very effective, but it is per- manent. You should choose sterilization only if you do not want to have more children.  A method without hormones that is effective    The lactational amenorrhea method described above is effective for the first 6 months.  Methods without hormones that are less effective    Natural family planning is monitoring your body for signs of ovulation and not having sex when you think you are ovulating. This method is reliable only if you are having regular periods every month.    Barrier methods (condoms, diaphragms, sponges, and spermicides) are used at the time you have sex. These methods are effective only if you use them correctly every time.    Methods with hormones  Birth control methods that use hormones can be used while you are breastfeeding. They may have a small effect on lowering the amount of milk you make. All hormones will get into your breast milk in very small amounts, but there is no known harm to your baby from this small amount of hormone in breast milk.only methodsmethods use only 1 hormone, called progestin. You can start them right after your baby is born or wait 4 to 6 weeks to make sure your milk supply is good.     Progestin-only pills ( minipills ): If you like to take pills every day, you can use the minipill. In order forpill to work well, you have to take 1 at the same time each day. When you stop breastfeeding, you should start pills that have both estrogen and progestin  because they are better at keeping you from get- ting pregnant.     Progestin IUD (Mirena): The progestin IUD is shaped and inserted into the uterus like the copper IUD. It works for up to 5 years. Both IUDs are usually inserted 4 to 6 weeks after the baby is born.    Progestin implant (Nexplanon): The progestin implant is a small matchstick-sized flexible gigi. It is placed into the fatty tissue in the back of your arm. It works for up to 3 years.    Progestin shot (Depo-Provera): The progestin shot is given every 3 months.    estrogen and progestin methods  These methods use 2 hormones, called estrogen and progestin.     These methods increase your risk of a blood clot, which is already higher than normal after you have a baby. You should not use them until your baby is at least 6 weeks old. The combined methods are not recommended as the first choice for women who are breastfeeding. If a combined method is the one that you feel will be best for you to prevent getting pregnant, these methods are okay to use while breastfeeding.     Combined birth control pills: You take a pill each day.    Vaginal ring (NuvaRing): The ring is worn in the vagina for 3 weeks then left out for 1 week before youin a new ring.    Patch (Ortho Evra): The patch is placed on your skin and changed every week for 3 weeks then left off forweek before putting a new patch on a different area of your skin.    Pediatric Care Providers at [facility]:    Choosing the right provider is one of the most important decisions you ll make about your health care. We can help you find the right one. Remember, you re looking for a provider you can trust and work with to improve your health and well-being, so take time to think about what you need. Depending on how complicated your health care needs are, you may need to see more than one type of provider.    Primary Care Providers: You ll see a primary care provider first for most health issues. They ll work  with you to get your recommended screenings, help you manage chronic conditions, and refer you to other types of providers if you need them. Your primary care provider may be called a family physician or doctor, internist, general practitioner, nurse practitioner, or physician s assistant. Your child or teenager s provider may be called a pediatrician.  Specialists: You ll see a specialist for certain services or to treat specific conditions. Specialists include cardiologists, oncologists, psychologists, allergists, podiatrists, and orthopedists. You may need a referral from your primary care provider before you go to a specialist in order for your health plan to pay for your visit.\pardHere are some tips for finding a provider where you live:  If you already have a provider you like and want to keep working with, call their office and ask if they accept your coverage.  Call your insurance company or state Medicaid and CHIP program. Look at their website or check your member handbook to find providers in your network who take your health coverage.  Ask your friends or family if they have providers they like and use these tools to compare health care providers in your area.    Family Medicine at [facility]: https://www.[facility].org/clinics/family-medicine.html    Many of our families enjoy all seeing the same doctor, who comes to know the whole family very well. We base our practice on the knowledge of the patient in the context of family and community.  WHY CHOOSE A FAMILY MEDICINE PHYSICIAN?  Ability of the whole family to see the same doctor  Focus on the whole person, including physical and emotional health  A personal relationship with their doctor that is nurtured over time  Respect for individual and family beliefs and values  No need to change primary care providers when a certain age is reached  Coordination of care when other health care services are needed    Pediatrics at [facility]:    [email or web address]/[email or web address]/[email or web address].html    Through a teaching affiliation with the [facility], [facility] staff keeps current on new developments in the field of pediatrics.     Everything we do centers around caring for children. We place special emphasis on wellness and prevention.pediatric care team includes a team of pediatricians and certified nurse practitioners who provide care to pediatric and adolescent patients ages 0 to 18, and some up to the age of 26. We offer preventive health maintenance for healthy children as well the diagnosis and treatment of common and chronic illnesses and injuries. In addition, we also offer several pediatric specialists who focus on adolescent health issues and developmental and behavioral issues.    Circumcision  What is circumcision?  At birth, baby boys have loose skin that covers the head of the penis. This skin is called the foreskin. When all or part of the foreskin of the penis is cut off, this is called circumcision.  Why is circumcision done?  Circumcision is done for many reasons including Advent, cultural, looks, and health. Some Advent groups circumcise all boys as a desmond-based practice. Many people in the [place] choose to circumcise their baby boys because they believe it is culturally normal. It is not a common practice in [place], [place], or [place].  Some parents choose circumcision so that their son will have a penis that looks like his father s if the father was also circumcised. Other people choose circumcision because they believe it is  or will protect the boy or man from infection or cancer later in life.  Does circumcision protect against infection or cancer?  Circumcision does seem to protect against some types of infection or cancer. Cancer of the penis is one type of cancer that circumcision may prevent. However, cancer of the penis is very rare. One hundred thousand circumcisions would need to  be done to prevent one case of cancer of the penis. Circumcision may also decrease the chance of some sexually transmitted infections, such as HIV and human papilloma virus (HPV). See the next page for more information on the risks and benefits of circumcision.  What happens during a circumcision?  Babies born in the hospital are usually circumcised before they go home. Health care providers also perform circumcisions in their offices and clinics within a few weeks after birth.  Adventism circumcisions are most often done at home or in a Evangelical.  Before the circumcision is performed, some providers give an injection (shot) of a small amount of anesthetic (numbing medicine) at the base of the penis to block the pain or put an anesthetic cream on the penis to numb the area that will be cut.  There are 2 different ways to do a circumcision. In one type, a clamp placed around the head of the penis cuts off the blood supply to the foreskin, and the foreskin above the clamp is cut off. The clamp is left on the penis until the area heals and it falls off a few days later. In another type of circumcision, the foreskin is cut off with scissors or a scalpel.  After the circumcision, petroleum jelly and sometimes gauze may be put over the area of the penis where the skin was removed. This protects the end of the penis while it heals.  Can I keep my son s penis  if it is circumcised?  Regular washing with soap and water will keep any penis clean. Circumcision does not make the penis . Uncircumcised boys do need to be taught to clean beneath their foreskin, just like they need to be taught to wash their hands or brush their teeth.  How do I decide if I should have my son circumcised?  The American Academy of Pediatrics (AAP) says that  circumcision may have health benefits. They do not recommend circumcision for all boys as a routine procedure. The AAP recommends that you talk to your health care provider  to decide if circumcision is the right choice for your family. You may also wish to discuss the question with your family or .  What are the risks and benefits of circumcision?  We do not have a lot of good scientific information about the health risks or benefits of circumcision.  Possible Risks:  Very few baby boys (less than 1 in 100) will have a problem after circumcision, such as bleeding or mild infection of the penis. These problems are usually not serious and are easy to treat.  Less common problems are:    Removal of too much or too little foreskin  Some rare problems are:    Narrowing of the opening of the penis, which can cause problems with urination    Removal of part of the penis or death of some of the other skin on the penis   Infection that spreads to other parts of the body  People used to think babies did not really feel pain. Now we know that they do. Many baby boys appear to feel a lot of pain during circumcision if anesthesia is not used.  We do not know if circumcision affects sexual function or sensation.  Possible Benefits:    Less risk for some kinds of cancers, like cancer of the penis    Fewer urinary tract (bladder or kidney) infections for babies    Less risk for some sexually transmitted infections, such as HIV, herpes, and HPV     May protect female sexual partners from some sexually transmitted infections    For More Information  American Academy of Family Physicians: Circumcision  http://familydoctor.org/familydoctor/en/pregnancy-newborns/caring-for-newborns/infant- care/circumcision.html  MedlinePlus: Circumcision (includes a slide show on the procedure)  www.nlm.nih.gov/medlineplus/circumcision.html  American Academy of Pediatrics: Policy statement on circumcision  Http://pediatrics.aappublications.org/content/130/3/e756.abstract      Childbirth and Parenting Education:   [facility] center: http://PearFunds/   [phone]  [place]: (education,  yoga & wellness) www.Wowsai  Enlightened Mama: www.enlightenedmama.com   Childbirth collective: (Parent topic nights)  www.childbirthcollective.org/  Hypnobabies:  www.hypnobabiestwincities.com/  Hypnobirthing:  Http://hypnobirthing.com/    Book Recommendations:   Makayla [place]'s Birthing From Within--first few chapters include a new-age tone, you may prefer to skip it and keep going, because there is good stuff later.  This book recommendation covers emotional preparation, but does cover coping with pain, and use of both pharmacological and nonpharmacological methods.    Dr. Garcia' The Pregnancy Book and The Birth Book--the pregnancy book goes month-by month    Womanly Art of Breastfeeding by La Leche League International   Bestfeeding by Lalita Gibbs--great pictures    Mothering Your Nursing Toddler, by Vidhi Enriquez.   Addresses dealing with so many of the challenging behaviors of a nursing toddler.  How Weaning Happens, by La Leche League.  Discusses weaning at all ages, from medically necessary weaning of an infant, all the way up to age 5 (or older), with why/why not, and strategies.  Very empowering book both for deciding to wean and deciding not to.    American College of Nurse-Midwives (ACNM) http://www.midwife.org/; look at the informational handouts at http://www.midwife.org/Share-With-Women     www.mymidwife.org    Mother to Baby (Medication and Herbal guidance in pregnancy): http://www.mothertobaby.org  Toll-Free Hotline: 993.272.2147  LactMed (Medication use while breastfeeding): http://toxnet.nlm.nih.gov/newtoxnet/lactmed.htm    Women's Health.gov:  http://www.womenshealth.gov/a-z-topics/index.html    American pregnancy association - http://americanpregnancy.org    Centering Pregnancy (group prenatal care option): http://centeringhealthcare.org    Information about doulas:  Childbirth collective: http://www.childbirthcollective.org/  Doulas of [place] (CATHERINE):  www.catherine.org  [facility]  " project: http://Peachtiesdoulaproject.com/     Early Childhood and Family Education (ECFE):  ECFE offers parents hands-on learning experiences that will nourish a lifetime of teachable moments.  http://ecfe.info/ecfe-home/    March of Dimes www.LightningBuy     FDA - Nutrition  www.mypyramid.gov  Under \"For Consumers,\" click on \"pregnant and breastfeeding women.\"      Centers for Disease Control and Prevention (CDC) - Vaccines : http://www.cdc.gov/vaccines/       When researching information on the web, question the validity of websites.  The Code Blue .gov, .edu and.org tend to be more reliable information.  If there are a lot of advertisements, be cautious of the information provided. Stay away from blogs and chat rooms please!   "

## 2021-05-31 VITALS — WEIGHT: 161.3 LBS | BODY MASS INDEX: 26.87 KG/M2 | HEIGHT: 65 IN

## 2021-05-31 VITALS — HEIGHT: 65 IN | WEIGHT: 169.5 LBS | BODY MASS INDEX: 28.24 KG/M2

## 2021-05-31 VITALS — WEIGHT: 202.3 LBS | HEIGHT: 65 IN | BODY MASS INDEX: 33.7 KG/M2

## 2021-05-31 VITALS — BODY MASS INDEX: 34.16 KG/M2 | WEIGHT: 205 LBS | HEIGHT: 65 IN

## 2021-05-31 VITALS — WEIGHT: 200.4 LBS | BODY MASS INDEX: 33.39 KG/M2 | HEIGHT: 65 IN

## 2021-05-31 VITALS — BODY MASS INDEX: 32.65 KG/M2 | WEIGHT: 196 LBS | HEIGHT: 65 IN

## 2021-05-31 VITALS — WEIGHT: 204.1 LBS | BODY MASS INDEX: 34.01 KG/M2 | HEIGHT: 65 IN

## 2021-05-31 VITALS — BODY MASS INDEX: 29.82 KG/M2 | WEIGHT: 179 LBS | HEIGHT: 65 IN

## 2021-05-31 VITALS — HEIGHT: 65 IN | WEIGHT: 185.4 LBS | BODY MASS INDEX: 30.89 KG/M2

## 2021-05-31 VITALS — HEIGHT: 65 IN | WEIGHT: 192.1 LBS | BODY MASS INDEX: 32.01 KG/M2

## 2021-05-31 VITALS — HEIGHT: 65 IN | BODY MASS INDEX: 31.5 KG/M2 | WEIGHT: 189.1 LBS

## 2021-05-31 NOTE — PROGRESS NOTES
Tami is here with Colleen today.  Feeling well, however fatigued.  This is the most pregnant she has ever been!  Noted that contractions are little bit more painful, but not consistent.  Did take blood pressure at work today and was elevated to 147/97.  Denies current headache, visual changes, or right upper quadrant pain.  Did have very brief headache yesterday and earlier today, however resolved without medication.  Blood pressure in clinic today is within normal limits.  Agrees to HELLP labs today given elevated pressure earlier today.  Would recommend blood pressure check either Friday or Monday again.  Reviewed signs and symptoms of preeclampsia in detail and advised to call if they should occur.  Feeling active fetal movement.

## 2021-05-31 NOTE — PROGRESS NOTES
Assessment:          Normal postpartum recovery  S/P normal spontaneous vaginal delivery  S/p 2nd degree  EPDS 4  Grief    Plan:        1.  Reviewed routine postpartum care  2.  Reviewed outpatient lactation resources as well as help schedule lactation visit with Rebeka Malloy for next week.  3.  Discussed postpartum contraception, recommended sexual abstinence and pelvic rest, patient interested in progestin only oral contraceptive pill.  4.  We discussed grief process as well as transitional process to having 2 children in the way in which the relationship may change as the family grows and I recommended that she provide space and Le for herself as she is learning how to be a mother to do parents as well as growing and changing her relationship with her oldest child.    RTC 4 weeks for 6 week postpartum visit.       Subjective:       Tami Malik is a 24 y.o. female who presents for a postpartum follow up visit. She is 2 weeks postpartum following a normal spontaneous vaginal delivery with 2nd degree. The amount and color of the lochia is appropriate for the duration of recovery. Tami feels well and postpartum course has been stable.  Patient reports that baby is breast-feeding well overall, however having some difficulty with latch on right breast, open to suggestions as well as visit with lactation, she has also been pumping to provide breast milk from the right breast.  Patient does share some grief as a friend recently  but feels well supported by family and friends.  Also reports that is noticing that the adjustment to having 2 children has been difficult.  Reports normal bowel and bladder, has been taking stool softener which is helping.  Reports that pain is well controlled with medications and is not bleeding heavily.  No history of depression or anxiety.  No emotional concerns today.  Open to rechecking hemoglobin today due to postpartum hemorrhage, level is appropriate at 11.3, is taking iron  "supplementation daily and feeling better however does notice if on her feet are very active throughout the day will feel a little bit drained.        Review of Systems  General:  Denies problem  Eyes: Denies problem  Ears/Nose/Throat: Denies problem  Cardiovascular: Denies problem  Respiratory:  Denies problem  Gastrointestinal:  Denies problem, Genitourinary: Denies problem  Musculoskeletal:  Denies problem  Skin: Denies problem  Neurologic: Denies problem  Psychiatric: Denies problem  Endocrine: Denies problem  Heme/Lymphatic: Denies problem   Allergic/Immunologic: Denies problem          Objective:         Vitals:    08/30/19 0901   BP: 122/66   Pulse: 76   Weight: 199 lb (90.3 kg)   Height: 5' 4\" (1.626 m)         Physical Exam:  General Appearance: Alert, cooperative, no distress, appears stated age  Respiratory: Normal respiratory effort  Cardiovascular: No peripheral edema. Regular rate and rhythm, no murmurs   Musculoskeletal: No digital cyanosis. Normal gait and station. Moves all limbs freely.    MARNIE Elliott, CNM  [facility]        "

## 2021-05-31 NOTE — TELEPHONE ENCOUNTER
Telephone encounter: I left a voicemail for patient letting her know that I was checking in to see how she is doing a week following her birth and to invite her to make a 2-week postpartum appointment.  I provided both the clinic and the on-call phone numbers and asked her to call the clinic number if she would like to make an appointment.    MARNIE Hilario,JAQUAN

## 2021-05-31 NOTE — TELEPHONE ENCOUNTER
Reviewed all HELLP lab results, cervical exam and vital signs from today's visit.  Discussed results with IHOB. Patient does not meet criteria for dx of pre-eclampsia because she had only one elevated blood pressure and repeat was normal.  AZ/CR was the only abnormal lab result at 2.76.  The IHOB did not recommend IOL at this time as her myers score today was 5. Recommended that patient have close follow-up on Friday for a blood pressure check and repeat labs.  Discussed lab results and recommendations with patient.  She continues to deny any sxs of preeclampsia.  Reviewed warning sxs in detail and when to call with the patient.  Patient agrees to call tomorrow to make a follow-up appointment for BP check and repeat labs for Friday.  Message sent to scheduling team to assure patient gets scheduled for follow-up.  All questions answered and patient is in agreement with plan.

## 2021-05-31 NOTE — TELEPHONE ENCOUNTER
PHONE CALL TO ON-CALL CNM:  Spouse Donovan call on Tami's behalf.  Reports she has been marielena for 2 hours (less than 10 min).  Contractions have now increased steeply in intensity and have moved from 6-7 min apart to 3 min apart.  The couple says they are already on their way to the hospital.    Notified [facility] of impending laboring patient arrival.

## 2021-05-31 NOTE — PROGRESS NOTES
Tami is here with Colleen.  Feeling fatigued today.  Has had a long day at work and did not sleep well last night.  Denies any signs or symptoms of preeclampsia.  Has been feeling some episodes of contractions, but nothing that is regular or progressing.  Asking about when we would recommend induction of labor.  Discussed eligibility for elective induction after 39 weeks with favorable cervix, or offered to anybody at 41 weeks of pregnancy.  Feeling active fetal movement.  Desires SVE today.

## 2021-05-31 NOTE — PATIENT INSTRUCTIONS - HE
[facility] - Contact information:  Appointment line and to get a hold of CNM in clinic Monday-Friday 8 am - 5 pm:  [phone].  There are some clinics with early start times (1st appointment 7:40 am) and others with evening hours (last appointment 6:20 pm).  Most are typically open from 8 am to 5 pm.    CNM on call answering service: [phone].  Specify your hospital of choice and leave a brief message for CNM;  will then page CNM who is on call at your specified hospital and you should receive a call back with 15 minutes.  Be sure that your ringer is audible and that you can accept blocked calls so that we can get back in touch with you! This number should be reserved for urgent needs if during the day, before 8 am, after 5 pm, weekends, holidays.    Contact the on-call CNM with warning signs, such as:    vaginal bleeding     Vaginal discharge and itching or pain and burning during urination    Leg/calf pain or swelling on one side    severe abdominal pain    nausea and vomiting more than 4-5 times a day, or if you are unable to keep anything down    fever more than 100.4 degrees F.         You are invited to  Meet the [facility] Nurse[facility]Midwives  A way to tour the hospital Labor and Delivery unit and meet the midwives that attend births since you may not have the opportunity to meet them during your prenatal care.  Some sessions are informal meet and greet type social hours, others address a specific concern or topic.    Thursday, Februrary 22, 2018 7-8pm  [facility]  Social Hour    Thursday, April 19, 2018 7-8pm  [facility]  Exercise, nutrition and weight gain    Tuesday, June 28, 2018 7-8pm  [facility]  Postpartum depression/anxiety    Thursday August 23, 2018 7-8 pm  [facility], [facility]  Physiology of birth and breastfeeding, Pediatrician presentation    Thursday October 18,  2018  7-8pm,  [facility], [facility]  Social Hour    Please call [phone] to register        Touring the Maternity Care Center  To schedule a tour at either [facility] or [facility], please do so online using the following links:  [facility] - [email or web address]  [facility] - [email or web address]    Childbirth and Parenting Education:   [facility]: http://Kuwo Science and TechnologyWyckoff Heights Medical CenterGaatu/   [phone]  [place]: (education, yoga & wellness) www.awe.sm  Enlightened Mama: www.Krugleenedmama.CurTran   Childbirth collective: (Parent topic nights)  www.childbirthcollective.org/  Hypnobabies:  www.hypnobabiestwincities.com/  Hypnobirthing:  Http://hypnobirthing.com/    Breastfeeding Information:  An excellent 15-minute video on preparing for a good breastfeeding experience, including how to ensure you have a good milk supply and a comfortable latch, can be found here:  https://Tbricks.CurTran/      Book Recommendations:   Makayla Xavi's Birthing From [facility]--first few chapters include a new-age tone, you may prefer to skip it and keep going, because there is good stuff later.  This book recommendation covers emotional preparation, but does cover coping with pain, and use of both pharmacological and nonpharmacological methods.    Dr. Garcia' The Pregnancy Book and The Birth Book--the pregnancy book goes month-by month    Womanly Art of Breastfeeding by La Leche League International   Bestfeeding by Lalita Gibbs--great pictures    Mothering Your Nursing Toddler, by Vidhi Enriquez.   Addresses dealing with so many of the challenging behaviors of a nursing toddler.  How Weaning Happens, by La Leche League.  Discusses weaning at all ages, from medically necessary weaning of an infant, all the way up to age 5 (or  "older), with why/why not, and strategies.  Very empowering book both for deciding to wean and deciding not to.    American College of Nurse-Midwives (ACNM) http://www.midwife.org/; look at the informational handouts at http://www.midwife.org/Share-With-Women     www.mymidwife.org    Mother to Baby (Medication and Herbal guidance in pregnancy): http://www.mothertobaby.org  Toll-Free Hotline: 199.447.7584  LactMed (Medication use while breastfeeding): http://toxnet.nlm.nih.gov/newtoxnet/lactmed.htm    Women's Health.gov:  http://www.womenshealth.gov/a-z-topics/index.html    American pregnancy association - http://americanpregnancy.org    Centering Pregnancy (group prenatal care option): http://centeringhealthcare.org    Information about doulas:  Childbirth collective: http://www.childbirthcollective.org/  Doulas of [place] (CATHERINE):  www.catherine.org  Trendy Mondays [facility]  project: http://Contents Firstcitiesdoulaproject.com/     Early Childhood and Family Education (ECFE):  ECFE offers parents hands-on learning experiences that will nourish a lifetime of teachable moments.  http://ecfe.info/ecfe-home/     [place] www.CS Products.Nanigans     FDA - Nutrition  www.mypyramid.gov  Under \"For Consumers,\" click on \"pregnant and breastfeeding women.\"      Centers for Disease Control and Prevention (CDC) - Vaccines : http://www.cdc.gov/vaccines/       When researching information on the web, question the validity of websites.  The domains .gov, .edu and.org tend to be more reliable information.  If there are a lot of advertisements, be cautious of the information provided. Stay away from blogs and chat rooms please!      Screening Program  Http://www.health.[facility].mn.us/newbornscreening/  [place] newborns are tested soon after birth for more than 50 hidden, rare disorders, including hearing loss and critical congenital heart disease (CCHD). This site provides resources and information for families and providers.    HEALTHY " PREGNANCY CARE: 37 to 41 WEEKS PREGNANT    Talk with your midwife or physician about when to call with signs of labor    Regular uterine contractions that are getting closer together and/or stronger    If you think your water has broken or is leaking    Bleeding from the vagina like a period (bloody vaginal discharge is normal)    If you are not feeling your baby move    Make plans for transportation and  as needed for when you are going to the hospital.    Your midwife or physician may offer to check your cervix for changes.     Ask your health care provider about vaccinations you may need following delivery. By now, you should have received a Tdap immunization to protect against pertussis or whooping cough. Fathers and family members who will be in close contact with the baby should also receive a Tdap shot at least two weeks before the expected birth of the baby if they have not had a Td (tetanus) shot for at least two years.    If you are past your due date, discuss the next steps leading to delivery with your midwife or physician. If you don't start labor on your own by 41 or 42 weeks, your midwife or physician may recommend giving you medicines to ripen your cervix and start labor.  Induction of labor: http://onlinelibrary.lan.com/store/10.1016/j.jmwh.2008.04.018/asset/j.jmwh.2008.04.018.pdf?v=1&t=ktcq1lxh&r=[email or web address]    Preparing for your baby: Tell your midwife or physician how you plan to feed your baby (breast or bottle), who you have chosen to do pediatric care for your baby, and if you have a boy, whether you have chosen to have him circumcised. You will need a car seat correctly installed in your vehicle to bring your baby home. As you start to set up the nursery at home for your baby, make sure the crib is safe. The mattress needs to fit snugly against the edges of the crib. If you can fit a soda can between the bars, they are too far apart and can allow the  baby's head to caught between them.    Learn about infant care and feeding, including information about infant CPR. We recommend that you put your baby to sleep on his or her back to reduce the chance of Sudden Infant Death Syndrome (SIDS). To maintain a healthy environment in which your child can grow, it's best to keep your home smoke-free. By preparing ahead, your transition into parenthood will go smoothly for you and your baby.    Your midwife or physician will want to see you for a checkup 2 to 6 weeks after delivery.    If you have questions about any symptoms you are experiencing or any other concerns, call your provider or their clinic staff at [facility] at Phone: [phone]. If it's after clinic hours, physician patients should call the [facility] at [phone]); midwife patients should call their answering service at [phone].    How can you care for yourself at home?   You can refer to the Starting Out Right book or find it online at http://www.healtheast.org/images/stories/maternity/[facility]-Starting-Out-Right.pdf or http://www.health[facility].org/images/stories/flipbooks/[facility]-starting-out-right/[facility]-starting-out-right.html#p=8     You can sign up for a weekly parenting e-mail that gives support, tips and advice from health care professionals that starts with pregnancy and continues through the toddler years. To register, go to www.health[facility].org/baby at any time during your pregnancy.        Making Plans for Feeding My Baby    By this point, you probably have read a lot about feeding your baby.  Breastfeed or formula? Each mother s decision is her own and [facility] respects you and your choices. We ve gathered information on both breastfeeding and formula feeding to help with your decision. Talking with your physician or nurse-midwife can also help in your decision.  However you plan to feed your baby, [facility] encourage  rooming in with your baby, skin-to-skin contact and feeding your baby based on his or her cues.    Skin-to-skin contact  Being close to mom helps your baby adjust to life outside of the womb.  It helps your baby regulate their body temperature, heart rate, and breathing.  Your baby will usually be placed skin-to-skin immediately following birth or as soon as possible, if medical intervention is needed.    Rooming-In  Having your baby stay with you in your room is called  rooming-in .  Keeping your baby in your room helps you to learn how to care for your baby by getting to know your baby s cues, body rhythms and sleep cycle.       Cue-based feeding  Cues (signals) are baby s way of telling you what he or she wants.  When you learn your infant s cues, you know how to care for and feed your baby.   Feeding cues are the licking and smacking of lips, bringing their fist to their mouth, and a reflex called  rooting - where baby turns and opens his or her mouth, searching for the breast or bottle.  Crying is a late feeding cue.  Babies can feed frequently, often at least 8 times in 24 hours.    Breastfeeding facts  Breast milk is the best source of nutrition for your baby and is available at birth. In the first couple of days, your milk volume is already starting to increase, though it may not be noticeable. Breastfeed frequently to increase your milk supply. Within three to five days, you will begin to notice larger milk volumes. An increase in breast size, heaviness and firmness are often described as the milk  coming in.  Frequent breastfeeding can help breasts from getting overly firm and painful. You will know the baby is getting enough milk if your baby is having wet and dirty diapers and gaining weight.     If your goal is to exclusively breastfeed, it is important to not use any formula or artificial nipples (including bottles and pacifiers) while your baby is learning to breastfeed.  While it may seem like an   easy  option to give your baby a bottle, formula should only be given if there is a medical reason for your baby to have it.      Positioning and attachment   Get comfortable.  Use pillows as needed to support your arms and baby.  Hold baby close at the level of your breast, facing you in a tummy to tummy position.  Skin to skin helps with this.  Position the baby with his or her nose by the nipple.  There should be a straight line from baby s ear to shoulder to hips.  Tickle your baby s lips or wait for baby to open mouth wide, bring baby to breast by leading with the chin.  Aim the nipple at the roof of baby s mouth.  A rapid sucking pattern is followed by longer, drawing pattern with occasional swallows heard.  When baby is correctly latched, your nipple and much of the areola are pulled well into baby s mouth.      Returning to work or school  Focus on a good start to breastfeeding.  Many women continue to provide breastmilk for their baby when they return to work or school.  Making plans about where to pump and store milk can make the transition go well.  Talk with other mothers who have also returned to work or school for tips and support.  Your employer s Human Resource department may be a resource as well.     Returning to work or school: (continued)     babies can mean fewer  sick  days for you.    A quality breast pump will also save time and add comfort.  Check with your insurance prior to giving birth for breast pump coverage.  Many insurance companies include a pump within your benefits.    Wait until your baby is at least three weeks old to introduce a bottle for the first time.  Have someone besides you give the bottle.    Breastfeed when you are with your baby. Reserve your bottles of breast milk for when you are away.     Your breasts will need to be  emptied  either by your baby or a pump.  Plan to pump at least twice in an eight hour day.    If you cannot pump at work, continue  breastfeeding at home. Any amount of breast milk is worth giving to your baby.    Formula feeding facts  If you are planning to use formula to feed your baby, you will want to make some preparations ahead of time. Talk to your doctor or nurse-midwife about what type of formula to use. Some are iron-fortified, meaning they have extra iron in them. You will want to purchase formula and bottles before your baby is born to be sure you are ready after you return from the hospital. The [facility] do not provide formula samples to take home.    Be sure to follow formula mixing directions closely. Regular milk in the dairy case at the grocery store should not be given to babies under 1 year old. Baby formula is sold in several forms including:    Ready-to-use. This is the most expensive, but no mixing is necessary.    Concentrated liquid. This is less expensive than ready-to-use and you mix with water.    Powder. This is the least expensive. You mix one level scoop of powdered formula with two ounces of water and stir well.    Most babies need 2.5 ounces of formula per pound of body weight each day. This means an 8-pound baby may drink about 20 ounces of formula a day; however, this is just an estimate. The most important thing is to pay attention to your baby s cues.  If your baby is always fussy, needs more iron or has certain food allergies, your physician may suggest you change your baby s formula to a different kind.     How do I warm my baby s bottles?  You may feed your baby a bottle without warming it first. It is OK for the breast milk or formula to be cool or room temperature. If your baby seems to prefer it warmed, you can put the filled bottle in a container of warm water and let it stand for a few minutes. Check the temperature of the liquid on your skin before feeding it to your baby; to be sure it isn t too hot. Do not heat bottles in the microwave. Microwaves heat food and liquids unevenly, and  this can cause hot spots that can burn your baby.    How do I clean and sterilize bottles?  Sterilize bottles and nipples before you use them for the first time. You can do this by putting them in boiling water for 5 minutes. After that first time, you can wash them in hot and soapy water. Rinse them carefully to be sure there is no soap left on them. You can also wash them in the .    [facility] [phone]

## 2021-05-31 NOTE — PATIENT INSTRUCTIONS - HE
[facility] Nurse Midwives - Contact information:  Appointment line and to get a hold of CNM in clinic Monday-Friday 8 am - 5 pm:  [phone].  There are some clinics with early start times (1st appointment 7:40 am) and others with evening hours (last appointment 6:20 pm).  Most are typically open from 8 am to 5 pm.    CNM on call answering service: [phone].  Specify your hospital of choice and leave a brief message for CNM;  will then page CNM who is on call at your specified hospital and you should receive a call back with 15 minutes.  Be sure that your ringer is audible and that you can accept blocked calls so that we can get back in touch with you! This number should be reserved for urgent needs if during the day, before 8 am, after 5 pm, weekends, holidays.    Contact the on-call CNM with warning signs, such as:    vaginal bleeding     Vaginal discharge and itching or pain and burning during urination    Leg/calf pain or swelling on one side    severe abdominal pain    nausea and vomiting more than 4-5 times a day, or if you are unable to keep anything down    fever more than 100.4 degrees F.         You are invited to  Meet the [facility] Nurse-Midwives  A way to tour the hospital Labor and Delivery unit and meet the midwives that attend births since you may not have the opportunity to meet them during your prenatal care.  Some sessions are informal meet and greet type social hours, others address a specific concern or topic.    Thursday, Februrary 22, 2018 7-8pm  [facility]  Social Hour    Thursday, April 19, 2018 7-8pm  [facility], [facility]  Exercise, nutrition and weight gain    Tuesday, June 28, 2018 7-8pm  [facility]  Postpartum depression/anxiety    Thursday August 23, 2018 7-8 pm  [facility], [facility]  Physiology of birth and breastfeeding, Pediatrician presentation    Thursday October 18,  2018  7-8pm,  [facility], [facility]  Social Hour    Please call [phone] to register        Touring the Maternity Care Center  To schedule a tour at either [facility] or [facility], please do so online using the following links:  [facility] - [email or web address].[employer]/[email or web address]&[email or web address]&[email or web address]&[email or web address]&it=1&wdm=001  [facility] - https://www.[employer]/registerlist.asp?s=[email or web address]&[email or web address]&[email or web address]&[email or web address]&[email or web address]&[email or web address]&[email or web address]&it=1&jtl=754    Childbirth and Parenting Education:   [facility]: http://[facility] CenterLifeBlinx/   [phone]  [place]: (education, yoga & wellness) www.Share Some Style  Enlightened Mama: www.[facility].Smalldeals   Childbirth collective: (Parent topic nights)  www.childbirthcollective.org/  Hypnobabies:  www.hypnobabiestwincities.com/  Hypnobirthing:  Http://hypnobirthing.com/    Book Recommendations:   Makayla Xavi's Birthing From Within--first few chapters include a new-age tone, you may prefer to skip it and keep going, because there is good stuff later.  This book recommendation covers emotional preparation, but does cover coping with pain, and use of both pharmacological and nonpharmacological methods.    Dr. Garcia' The Pregnancy Book and The Birth Book--the pregnancy book goes month-by month    Womanly Art of Breastfeeding by [employer] Leche [employer]   Bestfeeding by Lalita Gibbs--great pictures    Mothering Your Nursing Toddler, by Vidhi Enriquez.   Addresses dealing with so many of the challenging behaviors of a nursing toddler.  How Weaning Happens, by La Leche League.  Discusses weaning at all ages, from medically necessary weaning of an infant, all the way up to age 5 (or older), with why/why not, and strategies.  Very empowering book both for deciding to wean and deciding not to.    American College of Nurse-Midwives (ACNM) http://www.midwife.org/; look at the informational handouts at  "http://www.midwife.org/Share-With-Women     www.mymidwife.org    Mother to Baby (Medication and Herbal guidance in pregnancy): http://www.mothertobaby.org  Toll-Free Hotline: 743.232.5528  LactMed (Medication use while breastfeeding): http://toxnet.nlm.nih.gov/newtoxnet/lactmed.htm    Women's Health.gov:  http://www.womenshealth.gov/a-z-topics/index.html    American pregnancy association - http://americanpregnancy.org    Centering Pregnancy (group prenatal care option): http://centeringhealthcare.org    Information about doulas:  Childbirth collective: http://www.childbirthcollective.org/  Doulas of [place] (CATHERINE):  www.catherine.org  [facility]  project: http://The African Storecitiesdoulaproject.com/     Early Childhood and Family Education (ECFE):  ECFE offers parents hands-on learning experiences that will nourish a lifetime of teachable moments.  http://ecfe.info/ecfe-home/     Dim www.marchofThe Pickwick Projectmes.com     FDA - Nutrition  www.mypyramid.gov  Under \"For Consumers,\" click on \"pregnant and breastfeeding women.\"      Centers for Disease Control and Prevention (CDC) - Vaccines : http://www.cdc.gov/vaccines/       When researching information on the web, question the validity of websites.  The [employer] .gov, .edu and.org tend to be more reliable information.  If there are a lot of advertisements, be cautious of the information provided. Stay away from blogs and chat rooms please!     [place] Screening Program  Http://www.health.Pending sale to [facility].mn.us/newbornscreening/  [place] newborns are tested soon after birth for more than 50 hidden, rare disorders, including hearing loss and critical congenital heart disease (CCHD). This site provides resources and information for families and providers.    HEALTHY PREGNANCY CARE: 37 to 41 WEEKS PREGNANT    Talk with your midwife or physician about when to call with signs of labor    Regular uterine contractions that are getting closer together and/or stronger    If you think your " water has broken or is leaking    Bleeding from the vagina like a period (bloody vaginal discharge is normal)    If you are not feeling your baby move    Make plans for transportation and  as needed for when you are going to the hospital.    Your midwife or physician may offer to check your cervix for changes.     Ask your health care provider about vaccinations you may need following delivery. By now, you should have received a Tdap immunization to protect against pertussis or whooping cough. Fathers and family members who will be in close contact with the baby should also receive a Tdap shot at least two weeks before the expected birth of the baby if they have not had a Td (tetanus) shot for at least two years.    If you are past your due date, discuss the next steps leading to delivery with your midwife or physician. If you don't start labor on your own by 41 or 42 weeks, your midwife or physician may recommend giving you medicines to ripen your cervix and start labor.  Induction of labor: http://onlinelibrary.lan.com/store/10.1016/j.jmwh.2008.04.018/asset/j.jmwh.2008.04.018.pdf?v=1&t=ltcn3cdu&e=[email or web address]    Preparing for your baby: Tell your midwife or physician how you plan to feed your baby (breast or bottle), who you have chosen to do pediatric care for your baby, and if you have a boy, whether you have chosen to have him circumcised. You will need a car seat correctly installed in your vehicle to bring your baby home. As you start to set up the nursery at home for your baby, make sure the crib is safe. The mattress needs to fit snugly against the edges of the crib. If you can fit a soda can between the bars, they are too far apart and can allow the baby's head to caught between them.    Learn about infant care and feeding, including information about infant CPR. We recommend that you put your baby to sleep on his or her back to reduce the chance of Sudden Infant  Death Syndrome (SIDS). To maintain a healthy environment in which your child can grow, it's best to keep your home smoke-free. By preparing ahead, your transition into parenthood will go smoothly for you and your baby.    Your midwife or physician will want to see you for a checkup 2 to 6 weeks after delivery.    If you have questions about any symptoms you are experiencing or any other concerns, call your provider or their clinic staff at [facility] at Phone: [phone]. If it's after clinic hours, physician patients should call the [facility] at [phone]); midwife patients should call their answering service at [phone].    How can you care for yourself at home?   You can refer to the Starting Out Right book or find it online at http://www.healthBloompop.org/images/stories/maternity/[facility]-Starting-Out-Right.pdf or http://www.healthBloompop.org/images/stories/flipbooks/[facility]-starting-out-right/[facility]-starting-out-right.html#p=8     You can sign up for a weekly parenting e-mail that gives support, tips and advice from health care professionals that starts with pregnancy and continues through the toddler years. To register, go to www.health[facility].org/baby at any time during your pregnancy.        Making Plans for Feeding My Baby    By this point, you probably have read a lot about feeding your baby.  Breastfeed or formula? Each mother s decision is her own and [facility] respects you and your choices. We ve gathered information on both breastfeeding and formula feeding to help with your decision. Talking with your physician or nurse-midwife can also help in your decision.  However you plan to feed your baby, [facility] encourage rooming in with your baby, skin-to-skin contact and feeding your baby based on his or her cues.    Skin-to-skin contact  Being close to mom helps your baby adjust to life outside of the womb.  It helps your baby regulate  their body temperature, heart rate, and breathing.  Your baby will usually be placed skin-to-skin immediately following birth or as soon as possible, if medical intervention is needed.    Rooming-In  Having your baby stay with you in your room is called  rooming-in .  Keeping your baby in your room helps you to learn how to care for your baby by getting to know your baby s cues, body rhythms and sleep cycle.       Cue-based feeding  Cues (signals) are baby s way of telling you what he or she wants.  When you learn your infant s cues, you know how to care for and feed your baby.   Feeding cues are the licking and smacking of lips, bringing their fist to their mouth, and a reflex called  rooting - where baby turns and opens his or her mouth, searching for the breast or bottle.  Crying is a late feeding cue.  Babies can feed frequently, often at least 8 times in 24 hours.    Breastfeeding facts  Breast milk is the best source of nutrition for your baby and is available at birth. In the first couple of days, your milk volume is already starting to increase, though it may not be noticeable. Breastfeed frequently to increase your milk supply. Within three to five days, you will begin to notice larger milk volumes. An increase in breast size, heaviness and firmness are often described as the milk  coming in.  Frequent breastfeeding can help breasts from getting overly firm and painful. You will know the baby is getting enough milk if your baby is having wet and dirty diapers and gaining weight.     If your goal is to exclusively breastfeed, it is important to not use any formula or artificial nipples (including bottles and pacifiers) while your baby is learning to breastfeed.  While it may seem like an  easy  option to give your baby a bottle, formula should only be given if there is a medical reason for your baby to have it.      Positioning and attachment   Get comfortable.  Use pillows as needed to support your arms and  baby.  Hold baby close at the level of your breast, facing you in a tummy to tummy position.  Skin to skin helps with this.  Position the baby with his or her nose by the nipple.  There should be a straight line from baby s ear to shoulder to hips.  Tickle your baby s lips or wait for baby to open mouth wide, bring baby to breast by leading with the chin.  Aim the nipple at the roof of baby s mouth.  A rapid sucking pattern is followed by longer, drawing pattern with occasional swallows heard.  When baby is correctly latched, your nipple and much of the areola are pulled well into baby s mouth.      Returning to work or school  Focus on a good start to breastfeeding.  Many women continue to provide breastmilk for their baby when they return to work or school.  Making plans about where to pump and store milk can make the transition go well.  Talk with other mothers who have also returned to work or school for tips and support.  Your employer s Human Resource department may be a resource as well.     Returning to work or school: (continued)     babies can mean fewer  sick  days for you.    A quality breast pump will also save time and add comfort.  Check with your insurance prior to giving birth for breast pump coverage.  Many insurance companies include a pump within your benefits.    Wait until your baby is at least three weeks old to introduce a bottle for the first time.  Have someone besides you give the bottle.    Breastfeed when you are with your baby. Reserve your bottles of breast milk for when you are away.     Your breasts will need to be  emptied  either by your baby or a pump.  Plan to pump at least twice in an eight hour day.    If you cannot pump at work, continue breastfeeding at home. Any amount of breast milk is worth giving to your baby.    Formula feeding facts  If you are planning to use formula to feed your baby, you will want to make some preparations ahead of time. Talk to your doctor  or nurse-midwife about what type of formula to use. Some are iron-fortified, meaning they have extra iron in them. You will want to purchase formula and bottles before your baby is born to be sure you are ready after you return from the hospital. The [facility] do not provide formula samples to take home.    Be sure to follow formula mixing directions closely. Regular milk in the dairy case at the grocery store should not be given to babies under 1 year old. Baby formula is sold in several forms including:    Ready-to-use. This is the most expensive, but no mixing is necessary.    Concentrated liquid. This is less expensive than ready-to-use and you mix with water.    Powder. This is the least expensive. You mix one level scoop of powdered formula with two ounces of water and stir well.    Most babies need 2.5 ounces of formula per pound of body weight each day. This means an 8-pound baby may drink about 20 ounces of formula a day; however, this is just an estimate. The most important thing is to pay attention to your baby s cues.  If your baby is always fussy, needs more iron or has certain food allergies, your physician may suggest you change your baby s formula to a different kind.     How do I warm my baby s bottles?  You may feed your baby a bottle without warming it first. It is OK for the breast milk or formula to be cool or room temperature. If your baby seems to prefer it warmed, you can put the filled bottle in a container of warm water and let it stand for a few minutes. Check the temperature of the liquid on your skin before feeding it to your baby; to be sure it isn t too hot. Do not heat bottles in the microwave. Microwaves heat food and liquids unevenly, and this can cause hot spots that can burn your baby.    How do I clean and sterilize bottles?  Sterilize bottles and nipples before you use them for the first time. You can do this by putting them in boiling water for 5 minutes. After that  first time, you can wash them in hot and soapy water. Rinse them carefully to be sure there is no soap left on them. You can also wash them in the .    [facility] [phone] ([phone])

## 2021-05-31 NOTE — PATIENT INSTRUCTIONS - HE
[facility] Nurse Midwives - Contact information:  Appointment line and to get a hold of CNM in clinic Monday-Friday 8 am - 5 pm:  [phone].  There are some clinics with early start times (1st appointment 7:40 am) and others with evening hours (last appointment 6:20 pm).  Most are typically open from 8 am to 5 pm.    CNM on call answering service: [phone].  Specify your hospital of choice and leave a brief message for CNM;  will then page CNM who is on call at your specified hospital and you should receive a call back with 15 minutes.  Be sure that your ringer is audible and that you can accept blocked calls so that we can get back in touch with you! This number should be reserved for urgent needs if during the day, before 8 am, after 5 pm, weekends, holidays.    Contact the on-call CNM with warning signs, such as:    vaginal bleeding     Vaginal discharge and itching or pain and burning during urination    Leg/calf pain or swelling on one side    severe abdominal pain    nausea and vomiting more than 4-5 times a day, or if you are unable to keep anything down    fever more than 100.4 degrees F.         You are invited to  Meet the [facility] Nurse-Midwives  A way to tour the hospital Labor and Delivery unit and meet the midwives that attend births since you may not have the opportunity to meet them during your prenatal care.  Some sessions are informal meet and greet type social hours, others address a specific concern or topic.    Thursday, Februrary 22, 2018 7-8pm  [facility]  Social Hour    Thursday, April 19, 2018 7-8pm  [facility], [facility]  Exercise, nutrition and weight gain    Tuesday, June 28, 2018 7-8pm  [facility]  Postpartum depression/anxiety    Thursday August 23, 2018 7-8 pm  [facility], [facility]  Physiology of birth and breastfeeding, Pediatrician presentation    Thursday October 18,  2018  7-8pm,  [facility], [facility]  Social Hour    Please call [phone] to register        Touring the Maternity Care Center  To schedule a tour at either [facility] or [facility], please do so online using the following links:  [facility] - [email or web address]  [facility] - [email or web address]    Childbirth and Parenting Education:   [facility]: http://Codon DevicesSt. Luke's HospitalCEON Solutions Pvt/   [phone]  [place]: (education, yoga & wellness) www.Chiasma  Enlightened Mama: www.Infrafoneenedmama.Javelin   Childbirth collective: (Parent topic nights)  www.childbirthcollective.org/  Hypnobabies:  www.hypnobabiestwincities.com/  Hypnobirthing:  Http://hypnobirthing.com/    Breastfeeding Information:  An excellent 15-minute video on preparing for a good breastfeeding experience, including how to ensure you have a good milk supply and a comfortable latch, can be found here:  https://[employer].Javelin/      Book Recommendations:   Makayla Xavi's Birthing From [facility]--first few chapters include a new-age tone, you may prefer to skip it and keep going, because there is good stuff later.  This book recommendation covers emotional preparation, but does cover coping with pain, and use of both pharmacological and nonpharmacological methods.    Dr. Garcia' The Pregnancy Book and The Birth Book--the pregnancy book goes month-by month    Womanly Art of Breastfeeding by La Leche [employer] International   Bestfeeding by Lalita Gibbs--great pictures    Mothering Your Nursing Toddler, by Vidhi Enriquez.   Addresses dealing with so many of the challenging behaviors of a nursing toddler.  How Weaning Happens, by La Leche League.  Discusses weaning at all ages, from medically necessary weaning of an infant, all the way up to age 5 (or  "older), with why/why not, and strategies.  Very empowering book both for deciding to wean and deciding not to.    American College of Nurse-Midwives (ACNM) http://www.midwife.org/; look at the informational handouts at http://www.midwife.org/Share-With-Women     www.mymidwife.org    Mother to Baby (Medication and Herbal guidance in pregnancy): http://www.mothertobaby.org  Toll-Free Hotline: 416.195.7097  LactMed (Medication use while breastfeeding): http://toxnet.nlm.nih.gov/newtoxnet/lactmed.htm    Women's Health.gov:  http://www.womenshealth.gov/a-z-topics/index.html    American pregnancy association - http://americanpregnancy.org    Centering Pregnancy (group prenatal care option): http://centeringhealthcare.org    Information about doulas:  Childbirth collective: http://www.childbirthcollective.org/  Doulas of [place] (CATHERINE):  www.catherine.org  [facility] [facility]  project: http://LeadPagescitiesdoulaproject.com/     Early Childhood and Family Education (ECFE):  ECFE offers parents hands-on learning experiences that will nourish a lifetime of teachable moments.  http://ecfe.info/ecfe-home/     [place] www.MobiPixie.[employer]     FDA - Nutrition  www.mypyramid.gov  Under \"For Consumers,\" click on \"pregnant and breastfeeding women.\"      Centers for Disease Control and Prevention (CDC) - Vaccines : http://www.cdc.gov/vaccines/       When researching information on the web, question the validity of websites.  The domains .gov, .edu and.org tend to be more reliable information.  If there are a lot of advertisements, be cautious of the information provided. Stay away from blogs and chat rooms please!      Screening Program  Http://www.health.[facility].mn.us/newbornscreening/  [place] newborns are tested soon after birth for more than 50 hidden, rare disorders, including hearing loss and critical congenital heart disease (CCHD). This site provides resources and information for families and providers.    HEALTHY " PREGNANCY CARE: 37 to 41 WEEKS PREGNANT    Talk with your midwife or physician about when to call with signs of labor    Regular uterine contractions that are getting closer together and/or stronger    If you think your water has broken or is leaking    Bleeding from the vagina like a period (bloody vaginal discharge is normal)    If you are not feeling your baby move    Make plans for transportation and  as needed for when you are going to the hospital.    Your midwife or physician may offer to check your cervix for changes.     Ask your health care provider about vaccinations you may need following delivery. By now, you should have received a Tdap immunization to protect against pertussis or whooping cough. Fathers and family members who will be in close contact with the baby should also receive a Tdap shot at least two weeks before the expected birth of the baby if they have not had a Td (tetanus) shot for at least two years.    If you are past your due date, discuss the next steps leading to delivery with your midwife or physician. If you don't start labor on your own by 41 or 42 weeks, your midwife or physician may recommend giving you medicines to ripen your cervix and start labor.  Induction of labor: http://onlinelibrary.lan.com/store/10.1016/j.jmwh.2008.04.018/asset/j.jmwh.2008.04.018.pdf?v=1&t=kypz4qzo&a=[email or web address]    Preparing for your baby: Tell your midwife or physician how you plan to feed your baby (breast or bottle), who you have chosen to do pediatric care for your baby, and if you have a boy, whether you have chosen to have him circumcised. You will need a car seat correctly installed in your vehicle to bring your baby home. As you start to set up the nursery at home for your baby, make sure the crib is safe. The mattress needs to fit snugly against the edges of the crib. If you can fit a soda can between the bars, they are too far apart and can allow the  baby's head to caught between them.    Learn about infant care and feeding, including information about infant CPR. We recommend that you put your baby to sleep on his or her back to reduce the chance of Sudden Infant Death Syndrome (SIDS). To maintain a healthy environment in which your child can grow, it's best to keep your home smoke-free. By preparing ahead, your transition into parenthood will go smoothly for you and your baby.    Your midwife or physician will want to see you for a checkup 2 to 6 weeks after delivery.    If you have questions about any symptoms you are experiencing or any other concerns, call your provider or their clinic staff at [facility] [place] [facility] at Phone: [phone]. If it's after clinic hours, physician patients should call the [facility] at [phone] ([phone]); midwife patients should call their answering service at [phone].    How can you care for yourself at home?   You can refer to the Starting Out Right book or find it online at http://www.healtheast.org/images/stories/maternity/[facility]-Starting-Out-Right.pdf or http://www.healthNorthern [facility].org/images/stories/flipbooks/[facility]-starting-out-right/[facility]-starting-out-right.html#p=8     You can sign up for a weekly parenting e-mail that gives support, tips and advice from health care professionals that starts with pregnancy and continues through the toddler years. To register, go to www.[facility]Northern [facility] Center.org/baby at any time during your pregnancy.        Making Plans for Feeding My Baby    By this point, you probably have read a lot about feeding your baby.  Breastfeed or formula? Each mother s decision is her own and [facility] respects you and your choices. We ve gathered information on both breastfeeding and formula feeding to help with your decision. Talking with your physician or nurse-midwife can also help in your decision.  However you plan to feed your baby, [facility] Maternity Care Centers encourage  rooming in with your baby, skin-to-skin contact and feeding your baby based on his or her cues.    Skin-to-skin contact  Being close to mom helps your baby adjust to life outside of the womb.  It helps your baby regulate their body temperature, heart rate, and breathing.  Your baby will usually be placed skin-to-skin immediately following birth or as soon as possible, if medical intervention is needed.    Rooming-In  Having your baby stay with you in your room is called  rooming-in .  Keeping your baby in your room helps you to learn how to care for your baby by getting to know your baby s cues, body rhythms and sleep cycle.       Cue-based feeding  Cues (signals) are baby s way of telling you what he or she wants.  When you learn your infant s cues, you know how to care for and feed your baby.   Feeding cues are the licking and smacking of lips, bringing their fist to their mouth, and a reflex called  rooting - where baby turns and opens his or her mouth, searching for the breast or bottle.  Crying is a late feeding cue.  Babies can feed frequently, often at least 8 times in 24 hours.    Breastfeeding facts  Breast milk is the best source of nutrition for your baby and is available at birth. In the first couple of days, your milk volume is already starting to increase, though it may not be noticeable. Breastfeed frequently to increase your milk supply. Within three to five days, you will begin to notice larger milk volumes. An increase in breast size, heaviness and firmness are often described as the milk  coming in.  Frequent breastfeeding can help breasts from getting overly firm and painful. You will know the baby is getting enough milk if your baby is having wet and dirty diapers and gaining weight.     If your goal is to exclusively breastfeed, it is important to not use any formula or artificial nipples (including bottles and pacifiers) while your baby is learning to breastfeed.  While it may seem like an   easy  option to give your baby a bottle, formula should only be given if there is a medical reason for your baby to have it.      Positioning and attachment   Get comfortable.  Use pillows as needed to support your arms and baby.  Hold baby close at the level of your breast, facing you in a tummy to tummy position.  Skin to skin helps with this.  Position the baby with his or her nose by the nipple.  There should be a straight line from baby s ear to shoulder to hips.  Tickle your baby s lips or wait for baby to open mouth wide, bring baby to breast by leading with the chin.  Aim the nipple at the roof of baby s mouth.  A rapid sucking pattern is followed by longer, drawing pattern with occasional swallows heard.  When baby is correctly latched, your nipple and much of the areola are pulled well into baby s mouth.      Returning to work or school  Focus on a good start to breastfeeding.  Many women continue to provide breastmilk for their baby when they return to work or school.  Making plans about where to pump and store milk can make the transition go well.  Talk with other mothers who have also returned to work or school for tips and support.  Your employer s Human Resource department may be a resource as well.     Returning to work or school: (continued)     babies can mean fewer  sick  days for you.    A quality breast pump will also save time and add comfort.  Check with your insurance prior to giving birth for breast pump coverage.  Many insurance companies include a pump within your benefits.    Wait until your baby is at least three weeks old to introduce a bottle for the first time.  Have someone besides you give the bottle.    Breastfeed when you are with your baby. Reserve your bottles of breast milk for when you are away.     Your breasts will need to be  emptied  either by your baby or a pump.  Plan to pump at least twice in an eight hour day.    If you cannot pump at work, continue  breastfeeding at home. Any amount of breast milk is worth giving to your baby.    Formula feeding facts  If you are planning to use formula to feed your baby, you will want to make some preparations ahead of time. Talk to your doctor or nurse-midwife about what type of formula to use. Some are iron-fortified, meaning they have extra iron in them. You will want to purchase formula and bottles before your baby is born to be sure you are ready after you return from the hospital. The [facility] do not provide formula samples to take home.    Be sure to follow formula mixing directions closely. Regular milk in the dairy case at the grocery store should not be given to babies under 1 year old. Baby formula is sold in several forms including:    Ready-to-use. This is the most expensive, but no mixing is necessary.    Concentrated liquid. This is less expensive than ready-to-use and you mix with water.    Powder. This is the least expensive. You mix one level scoop of powdered formula with two ounces of water and stir well.    Most babies need 2.5 ounces of formula per pound of body weight each day. This means an 8-pound baby may drink about 20 ounces of formula a day; however, this is just an estimate. The most important thing is to pay attention to your baby s cues.  If your baby is always fussy, needs more iron or has certain food allergies, your physician may suggest you change your baby s formula to a different kind.     How do I warm my baby s bottles?  You may feed your baby a bottle without warming it first. It is OK for the breast milk or formula to be cool or room temperature. If your baby seems to prefer it warmed, you can put the filled bottle in a container of warm water and let it stand for a few minutes. Check the temperature of the liquid on your skin before feeding it to your baby; to be sure it isn t too hot. Do not heat bottles in the microwave. Microwaves heat food and liquids unevenly, and  this can cause hot spots that can burn your baby.    How do I clean and sterilize bottles?  Sterilize bottles and nipples before you use them for the first time. You can do this by putting them in boiling water for 5 minutes. After that first time, you can wash them in hot and soapy water. Rinse them carefully to be sure there is no soap left on them. You can also wash them in the .    [facility] [phone] ([phone])

## 2021-05-31 NOTE — PROGRESS NOTES
Tami is here with Colleen today.  Feeling well, however fatigued.  Still has back pain/sciatic pain, but it is manageable.  Her mother will be coming down the week after the baby is born and his parents will be coming to stay for several weeks the week prior to her COLLETTE.  They will likely be staying with his sister for some time as well.  Discussed when to call and come in in relation to labor or contractions.  She is feeling active fetal movements.

## 2021-06-01 ENCOUNTER — PRENATAL OFFICE VISIT - HEALTHEAST (OUTPATIENT)
Dept: MIDWIFE SERVICES | Facility: CLINIC | Age: 26
End: 2021-06-01

## 2021-06-01 VITALS — BODY MASS INDEX: 30.12 KG/M2 | WEIGHT: 180.8 LBS | HEIGHT: 65 IN

## 2021-06-01 VITALS — WEIGHT: 178.1 LBS | BODY MASS INDEX: 30.1 KG/M2

## 2021-06-01 DIAGNOSIS — Z34.83 ENCOUNTER FOR SUPERVISION OF OTHER NORMAL PREGNANCY IN THIRD TRIMESTER: ICD-10-CM

## 2021-06-01 ASSESSMENT — MIFFLIN-ST. JEOR: SCORE: 1718.58

## 2021-06-01 NOTE — TELEPHONE ENCOUNTER
Refill Approved    Rx renewed per Medication Renewal Policy. Medication was last renewed on 19.    Luna Rubio, [facility] Triage/Med Refill 9/10/2019     Requested Prescriptions   Pending Prescriptions Disp Refills     ferrous sulfate 325 (65 FE) MG tablet [Pharmacy Med Name: FERROUS SULFATE 325 MG TABLET] 30 tablet 2     Sig: TAKE 1 TABLET BY MOUTH EVERY DAY WITH BREAKFAST       Iron Supplements Refill Protocol Passed - 2019 12:32 PM        Passed - PCP or prescribing provider visit in past 12 months       Last office visit with prescriber/PCP: Visit date not found OR same dept: Visit date not found OR same specialty: Visit date not found  Last physical: Visit date not found Last MTM visit: Visit date not found   Next visit within 3 mo: Visit date not found  Next physical within 3 mo: Visit date not found  Prescriber OR PCP: MARNIE Paz,JAQUANM  Last diagnosis associated with med order: 1.  (normal spontaneous vaginal delivery)  - ferrous sulfate 325 (65 FE) MG tablet [Pharmacy Med Name: FERROUS SULFATE 325 MG TABLET]; TAKE 1 TABLET BY MOUTH EVERY DAY WITH BREAKFAST  Dispense: 30 tablet; Refill: 2    If protocol passes may refill for 12 months if within 3 months of last provider visit (or a total of 15 months).             Passed - Hemoglobin or Hematocrit in last 12 months     Hemoglobin   Date Value Ref Range Status   2019 11.3 (L) 12.0 - 16.0 g/dL Final     Hematocrit   Date Value Ref Range Status   2019 26.2 (L) 35.0 - 47.0 % Final

## 2021-06-01 NOTE — PATIENT INSTRUCTIONS - HE
POSTPARTUM INFORMATION AND RESOURCES:    Congratulations on the birth of your baby. We have gathered together some information on staying healthy in the postpartum time including information on exercise, nutrition and mental health. We have also listed some local and national resources for lactation support and mental health support.    If you have questions or concerns and need to speak with a midwife immediately, please call the on-call midwife at [phone].    If you have a non-emergent question or would like to schedule a follow up appointment, please call the clinic midwife at [phone].    Thank you for sharing your birth experience with the [facility].  Congratulations on the birth of your baby!    ---------------------------------------------------------------------------------------------------------  EXERCISE & NUTRITION:     Getting and Staying Active: A Healthy You!   -Why should I exercise? Exercise, being physically active, is very important for all women. Being active can help you:   Lose or maintain weight   Have more energy   Sleep better   Enjoy sex more   Relieve stress and think better   Lower the chance you will have heart disease, high blood pressure, and diabetes   Strengthen your bones and muscles   Have fewer hot flashes if you are older   -How active do I have to be to get the bene?ts of exercise?  Studies show that as little as 15 minutes of moderate exercise--like fast walking or dancing--3 times a week can improve the health of your heart. Ifyou want to get the best benefits from exercise, try to increase your physical activity to at least 30 minutes, 5 times a week. If you have a serious health problem,be sure to talk with your health care provider before starting an exercise program.     Taking Care of your Health: Health Care Maintenance Screening recommendations   In all the things women do, taking care of everything and everybody else, they sometimes forget to take  care of themselves. This handout reviews the health screenings and vaccines that are recommended for women of all ages. Talk with yourhealth care provider about which tests and vaccines you need. The chart below lists the screening tests and vaccinations recommended for healthy women who do not have a high risk for most diseases.   The recommendations in thischart are guidelines only. For some tests and vaccines, the chart says you should talk to your health care provider.This is because the best recommendations for you depend on your personal health history. Your health care provider may suggest more frequent testing or additional tests ifyou havea higher chance of getting some diseases     Planning Your Family: Developing a Reproductive Life Plan   Planning ahead can help you avoid getting pregnant when you don t want to be pregnant and also be in good health if and when you do decide to become pregnant. Many women have at least one pregnancy in their lives, even if it was not planned. In fact, about half of all pregnancies are not planned. Getting pregnant when you did not plan it can be a problem, or it can turn into a happy event. Planning pregnancy leads to healthier pregnancies, healthier mothers, and healthier families.   Although many women want to have a family, not everyone wants to have children. More and more women are childless by choice (also known as childfree). Whether to have children is a personal choice that only you can make. It s okay not to want children! If you never want to get pregnant, it is important to make sure you always use very effective birth control, such as an intrauterine device, the birth control implant, female sterilization (having your tubes tied), or your partner having a vasectomy.     Reliable resources:   ?   American College of Nurse-Midwives (ACNM) http://www.midwife.org/; look at the informational handouts at http://www.midwife.org/Share-With-Women   ??   Women's  "Health.gov:   http://www.womenshealth.gov/a-z-topics/index.html   FDA - Nutrition ?www.mypyramid.gov? Under \"For Consumers,\" click on \"pregnant and breastfeeding women.\"   ?   Vaccines : Centers for Disease Control and Prevention (CDC) http://www.cdc.gov/vaccines/   ?   [facility] www.[place]Swirl.com   ?   When researching information on the web, question the validity of websites.? The domains .gov, Organic Shop andZillabyteorg tend to be more reliable information.? If there are a lot of advertisements, be cautious of the information provided. Stay away from blogs and chat rooms please!   ?   ?   Nutrition and supplements:   Daily multivitamin vitamin (with 400 - 1000 mcg folic acid).? Take with food as needed.   ?   4-5 servings of dairy or other calcium rich foods (fish, leafy greens, soy)?per day - if not, take 500-1000 mg additional calcium (Tums, pills, chews). Take with dairy.   ?   Vitamin D3 4000 IU geltab daily. Vitamin D rich foods: Cod Liver Oil (1Tbsp), [place], Mackerel, Tuna, fortified milk and yogurt, Beef and liver, sardines, egg, fortified cereals, swiss cheese.? Take supplements with fattiest meal.?   ?   2-3 (4) oz servings of fish, seafood, nuts (walnuts & almonds), oils, avocado per week - if not, take Omega 3 Fatty acids: DHA & WINTER 3354-4552 mg per day. ?Other names: cod liver oil, fish oil. Take with fattiest meal.   ?   ?---------------------------------------------------------------------------------------------------------  POSTPARTUM & LACTATION RESOURCES :    Breastfeeding:   OUTPATIENT LACTATION RESOURCES   -Schedule an appointment with a [facility] QUAN who is also a Lactation Consultant by calling [phone]. Lottie Delarosa IBCLC, CNM at [facility] on Wednesdays, JAQUAN MackayM at [facility] on Tuesdays and [facility] on Thursdays.   [facility] Lactation Clinics located at [facility] [place], [facility] and [facility]   Call: [phone].     Inpatient " "support     Outpatient appointments     Telephone consultation     Breast-feeding classes available through [facility]   ?     -[facility]:  www.[employer].Apparity   -Attend a baby weigh in at [place]. Lactation consultants are available to answer questions   [facility]: Tuesdays 1:00 - 2:00   [facility]: Mondays 1:00 - 2:00   -Attend a New Mamma group or a Second Time Mama group at [place].     -Enlightened Mama: www.Foldrx PharmaceuticalsightenedmamaElectricite du [place]   -Attend one of the New Mama groups at [facility] in [facility]. [facility] also offers one-on-one in home and in office lactation consults.     -[facility]: www.lllofmndas.org/   -Attend a LeLeche League meeting. Multiple groups in several locations throughout [facility]. The meetings are no-cost and always informative breastfeeding education session through [employer] La Leche League     - Information on medication use while breastfeeding: http://toxnet.nlm.nih.gov/newtoxnet/lactmed.htm     Other Online Breastfeeding Resources:     healtheast.org/baby sign up for free online weekly e-mail     healtheast.org/maternity     Breastfeedingmadesimple.com     Llli.org (La Leche League)     Normalfed.com     Womenshealth.gov/breastfeeding     Workandpump.com     Slots.com    https://[employer]/abcs/   Click on \"Learn More About Attachment\"    -New Parent Connection Drop-In:  In collaboration with [employer], Early Childhood Family Education (ECFE), a program of [address], offers ongoing classes for new parents in their infants.  The connection classes offer support and resources to parents during the exciting and challenging period of transition following the birth of her baby.  Parents and babies (birth to 6 months of age) may join the group at any time.  Baby's birth to 3 months meet Tuesdays, from 1230 to 1:30 PM  Babies 3-6 months meet Tuesdays, from 4 to 5 PM    -[place] New Mama " Group: www.AdLemons/free-new-mama-group  -Attend [employer] New Mama. Groups located at three locations:  [facility] and [place]. Sign up online.       Additional Resources:?   -American College of Nurse-Midwives (ACNM) http://www.midwife.org/; look at the informational handouts at http://www.midwife.org/Share-With-Women  www.mymidwife.org   ?   -Women's Health.gov:   http://www.womenshealth.gov/a-z-topics/index.html   ?   -Early Childhood and Family Education (ECFE):   ECFE offers parents hands-on learning experiences that will nourish a lifetime of teachable moments.   http://ecfe.info/ecfe-home/       -----------------------------------------------------------------------------------------------------------   (Before, during and after pregnancy)   MOOD DISORDER RESOURCES :    Are you feeling sad or depressed?     Do you feel more irritable or angry with those around you?     Are you having difficulty bonding with your baby?     Do you feel anxious or panicky?     Are you having problems with eating or sleeping?     Are you having upsetting thoughts that you can t get out of your mind?     Do you feel as if you are  out of control  or  going crazy ?     Do you feel like you never should have become a mother?     Are you worried that you might hurt your baby or yourself?    Any of these symptoms, and many more, could indicate that you have a form of  mood or anxiety disorder, such as postpartum depression. While many women experience some mild mood changes during or after the birth of a child, 15 to 20% of women experience more significant symptoms of depression or anxiety. Please know that with informed care you can prevent a worsening of these symptoms and can fully recover. There is no reason to continue to suffer.  Women of every culture, age, income level and race can develop  mood and anxiety disorders. Symptoms can appear any time during pregnancy and the first 12  months after childbirth. There are effective and well-researched treatment options to help you recover. Although the term  postpartum depression  is most often used, there are actually several forms of illness that women may experience.    Resources:    -Pregnancy and Postpartum Support [place]: www.[facility] Servicesupportmn.org  Who We Are: We are a group of mental health &  practitioners, service organizations, and mother volunteers who provides services to those struggling with a pregnancy, loss, or postpartum mood disorder through the Helpline, professional training, our resource list and website.  What We Do: We provide support, advocacy, awareness, and training about  mental health in [place].     Community Resource List:   This is a list of  resources within our community.   http://Spotfav Reporting Technologiesupportmn.org/communityresourcelist    PSYCHOTHERAPISTS/CONSULTANTS   This is a list of licensed mental health professionals who have advanced clinical skills in the treatment of postpartum mood and anxiety disorders (PMADs).   Http://Spotfav Reporting Technologiesupportmn.org/mentalhealthproviderresourcelist   INTEGRATIVE MEDICINE PRACTITIONERS:   This is a list of licensed providers who practice Integrative Medicine: a form of treatment that combines alternative medicine with evidence based medicine in an effort to treat the  whole person  (the person, not just the disease).   http://Spotfav Reporting Technologiesupportmn.org/integrativemedicineproviders    PSYCHIATRIC CARE   This is a list of licensed Psychiatrists who have advanced clinical skills in the treatment and medication management for PMADs and lactating mothers.   Http://Spotfav Reporting Technologiesupportmn.org/psychiatryproviderresroucelist   Click on the links below to find detailed profiles and contact information of providers who have been screened and approved as having advanced training in the area of PMADs. Please note: [facility] does not endorse a specific provider.   The list is in alphabetical order by city. You  "can also search for providers by city or zip code using the search box. Please click on the \"Show\" box to the upper right to advance to the next page. You may also call our Helpline at [phone] if you would like for our Helpline volunteer to find a provider for you.    -Postpartum Depression Support Group:   Weekly groups at no cost through [facility], [place], , 1:30-3:00 p.m., at [facility], [address] [facility], [address]. [place], , 1:30-3:00 p.m., at [facility], [address]. To register for the group or get more information, call [phone].   -Postpartum Depression Support Group:   Outpatient Intensive Treatment program for women with  mood disorders [facility]     -[facility]  Resource Guide     Women s Mental Health at [facility]  This website provides a range of current information including discussion of new research findings in women s mental health and how such investigations inform day-to-day clinical practice. Despite the growing number of studies being conducted in women s health, the clinical implications of such work are frequently controversial, leaving patients with questions regarding the most appropriate path to follow. Providing these resources to patients and their doctors so that individual clinical decisions can be made in a thoughtful and collaborative fashion dovetails with the mission of our Center.    https://womensmentalhealth.org/      If you re having thoughts of harming yourself or your baby, it is vital to get support immediately. Call 911 or go to the nearest E.R.     TOLL-FREE / NATIONWIDE EMERGENCY ASSISTANCE   Immediate Emergency:  911   National Suicide Prevention Lifeline:   1-444-396-6294   Suicide Prevention Hotline:   5-916-KFGHYWU   National Postpartum Depression Hotline:   -PPD-MOMS "   Postpartum Support International (PSI)   PPD Helpline: (not a 24-hour hotline)   1-742.340.3680

## 2021-06-01 NOTE — PROGRESS NOTES
Routine Postpartum Visit      Subjective:       Tami is a 24 y.o. year old female who presents to clinic today for a postpartum visit.  She is currently 6 weeks postpartum of a  birth.  The birth happened on 19.  The baby was 39 3/7 weeks gestation at the time of the birth.  She attends the visit today with her , Les..    Tami's postpartum course has been uncomplicated.  Baby Les's  course has been uncomplicated.     Feelings about how her birth went: She was pleased with her birth, however was not expecting the fetal distress or postpartum hemorrhage.  Explains that they were talking about getting her an epidural and possible  but then the baby came quickly after that.    Bleeding Status:  Bled for who weeks.  Currently has no bleeding.  Sleeping Status: Sleeps well between feedings.  Baby gets up about 2 times per night  Eating: Healthy meals  Bowel/Bladder Function:  Bowel function is normal. Bladder function is normal.    Exercise: Has done some yoga.  Recommended hip strengthening exercises as she is feeling some instability in her hip area.  Will send resources via my chart.  [place]:  has resumed intercourse.  This went okay.  Discussed decreased libido and vaginal lubrication.  Recommended using vaginal lubrication and her being in a more dominant position for the first few times they have intercourse to help control depth and rhythm.  Recommended using some sort of back-up method for this for 7 days she takes progesterone only pill.  Pain: None.  Contraceptive Plans:  Planned contraception method is oral progesterone-only contraceptive.  They are discussing whether they want to have more children.  Donovan was very nervous when she had her postpartum hemorrhage in is considering not having more children.  She would like to have 4 children total.  She is asking about the likelihood of another postpartum hemorrhage.  She would be at higher risk for postpartum  "hemorrhage given the fact that she would be having another subsequent pregnancy and the fact that she has had 2 postpartum hemorrhages before.  Discussed keeping hemoglobin at an optimal level prior to going into labor and then also having to IV sites during labor, and giving TXA 30 to 60 minutes prior to giving birth.  This should not preclude them from having more children in the future.  Baby's Feeding Method:  Baby is feeding by breast.    Mental Health/Mood Status:  Feeling well emotionally.  [place]  Depression Scale score of 2 today.    Work Plans:  Will be returning to work.  2019.      Review of Systems  -A 12 point comprehensive review of systems was negative except as noted above.  -Prenatal history and intrapartum course were also reviewed today.        Objective:         Vitals:    19 1105   BP: 120/82   Pulse: 92   Weight: 198 lb 14.4 oz (90.2 kg)   Height: 5' 2\" (1.575 m)       Physical Exam:    General Appearance: Pleasant, articulate, well-groomed, well-nourished female.  Not in any apparent distress.    Neck: Supple, symmetrical, no adenopathy.  Thyroid:  Not enlarged, symmetric, no tenderness/mass/nodules  Back: no CVA tenderness  Breasts: Symmetrical, non-tender.  No masses, adenopathy, or nipple discharge.  Abdomen: Soft, non-tender, no masses, diastasis <1 finger breaths.  Pelvic:  -External genitalia:  Normal hair distribution, no lesion.   Birth laceration site appears well-healed.    -Urethral opening: Without lesions, or tenderness.   Declines bimanual exam.  Extremities: Extremities no edema, without varicosities.        Assessment/Plan:     1)  Normal postpartum visit.  Pap smear due 2023  2)  Contraceptive plans include:  oral progesterone-only contraceptive.  Educated about this method.      3)  General postpartum education completed including:  Self-care of physical and emotional needs in this new postpartum period, return of fertility, resumption of " intercourse, discussion of general health maintenance, sleep needs, required support of family/friends/community, and watching for signs/symptoms of  mood and anxiety disorders.  4) exercises for hip strengthening sent to her via my chart.  Discussed physical therapy if instability continues past 3 months postpartum..       TT = 40 minutes of time of which >50% on education/counseling/care-coordination

## 2021-06-02 VITALS — BODY MASS INDEX: 29.32 KG/M2 | HEIGHT: 65 IN | WEIGHT: 176 LBS

## 2021-06-02 VITALS — HEIGHT: 65 IN | BODY MASS INDEX: 28.84 KG/M2 | WEIGHT: 173.1 LBS

## 2021-06-02 VITALS — BODY MASS INDEX: 29.39 KG/M2 | WEIGHT: 176.4 LBS | HEIGHT: 65 IN

## 2021-06-02 VITALS — BODY MASS INDEX: 31.15 KG/M2 | WEIGHT: 184.3 LBS

## 2021-06-03 ENCOUNTER — HOSPITAL ENCOUNTER (OUTPATIENT)
Dept: ULTRASOUND IMAGING | Facility: CLINIC | Age: 26
Discharge: HOME | End: 2021-06-03
Attending: MIDWIFE
Payer: COMMERCIAL

## 2021-06-03 ENCOUNTER — AMBULATORY - HEALTHEAST (OUTPATIENT)
Dept: OBGYN | Facility: CLINIC | Age: 26
End: 2021-06-03

## 2021-06-03 VITALS — WEIGHT: 215.1 LBS | HEIGHT: 65 IN | BODY MASS INDEX: 35.84 KG/M2

## 2021-06-03 VITALS — HEIGHT: 64 IN | BODY MASS INDEX: 33.97 KG/M2 | WEIGHT: 199 LBS

## 2021-06-03 VITALS — BODY MASS INDEX: 31.87 KG/M2 | WEIGHT: 191.3 LBS | HEIGHT: 65 IN

## 2021-06-03 VITALS — BODY MASS INDEX: 37 KG/M2 | WEIGHT: 222.1 LBS | HEIGHT: 65 IN

## 2021-06-03 VITALS — BODY MASS INDEX: 37.94 KG/M2 | WEIGHT: 227.7 LBS | HEIGHT: 65 IN

## 2021-06-03 VITALS
HEIGHT: 62 IN | DIASTOLIC BLOOD PRESSURE: 82 MMHG | SYSTOLIC BLOOD PRESSURE: 120 MMHG | BODY MASS INDEX: 36.6 KG/M2 | WEIGHT: 198.9 LBS | HEART RATE: 92 BPM

## 2021-06-03 VITALS — HEIGHT: 65 IN | WEIGHT: 225.7 LBS | BODY MASS INDEX: 37.61 KG/M2

## 2021-06-03 VITALS — BODY MASS INDEX: 31.07 KG/M2 | HEIGHT: 65 IN | WEIGHT: 186.5 LBS

## 2021-06-03 VITALS — BODY MASS INDEX: 34.82 KG/M2 | WEIGHT: 209 LBS | HEIGHT: 65 IN

## 2021-06-03 VITALS — WEIGHT: 222.8 LBS | HEIGHT: 65 IN | BODY MASS INDEX: 37.12 KG/M2

## 2021-06-03 VITALS — HEIGHT: 65 IN | WEIGHT: 199.9 LBS | BODY MASS INDEX: 33.3 KG/M2

## 2021-06-03 DIAGNOSIS — Z34.83 ENCOUNTER FOR SUPERVISION OF OTHER NORMAL PREGNANCY IN THIRD TRIMESTER: ICD-10-CM

## 2021-06-04 VITALS
DIASTOLIC BLOOD PRESSURE: 74 MMHG | WEIGHT: 203.8 LBS | HEIGHT: 66 IN | HEART RATE: 84 BPM | SYSTOLIC BLOOD PRESSURE: 122 MMHG | BODY MASS INDEX: 32.75 KG/M2

## 2021-06-05 VITALS
HEART RATE: 92 BPM | HEIGHT: 66 IN | BODY MASS INDEX: 30.46 KG/M2 | SYSTOLIC BLOOD PRESSURE: 104 MMHG | WEIGHT: 189.5 LBS | DIASTOLIC BLOOD PRESSURE: 72 MMHG

## 2021-06-05 VITALS
HEART RATE: 90 BPM | DIASTOLIC BLOOD PRESSURE: 64 MMHG | WEIGHT: 190 LBS | BODY MASS INDEX: 30.53 KG/M2 | SYSTOLIC BLOOD PRESSURE: 110 MMHG | OXYGEN SATURATION: 99 % | HEIGHT: 66 IN

## 2021-06-05 VITALS
WEIGHT: 194 LBS | SYSTOLIC BLOOD PRESSURE: 112 MMHG | HEART RATE: 78 BPM | HEIGHT: 66 IN | BODY MASS INDEX: 31.18 KG/M2 | DIASTOLIC BLOOD PRESSURE: 66 MMHG

## 2021-06-05 VITALS
WEIGHT: 207.8 LBS | DIASTOLIC BLOOD PRESSURE: 76 MMHG | HEART RATE: 92 BPM | SYSTOLIC BLOOD PRESSURE: 100 MMHG | HEIGHT: 66 IN | BODY MASS INDEX: 33.4 KG/M2

## 2021-06-05 VITALS
HEART RATE: 80 BPM | BODY MASS INDEX: 31.93 KG/M2 | DIASTOLIC BLOOD PRESSURE: 60 MMHG | HEIGHT: 66 IN | WEIGHT: 198.7 LBS | SYSTOLIC BLOOD PRESSURE: 108 MMHG

## 2021-06-05 VITALS
BODY MASS INDEX: 33.33 KG/M2 | HEIGHT: 66 IN | HEART RATE: 88 BPM | WEIGHT: 207.4 LBS | DIASTOLIC BLOOD PRESSURE: 80 MMHG | SYSTOLIC BLOOD PRESSURE: 110 MMHG

## 2021-06-05 VITALS
SYSTOLIC BLOOD PRESSURE: 104 MMHG | HEIGHT: 66 IN | BODY MASS INDEX: 33.73 KG/M2 | DIASTOLIC BLOOD PRESSURE: 74 MMHG | HEART RATE: 88 BPM | WEIGHT: 209.9 LBS

## 2021-06-06 NOTE — PROGRESS NOTES
.FEMALE PREVENTATIVE EXAM    Assessment and Plan:       1. Routine health maintenance  Healthy female exam.  Doing well postpartum.    2. Unspecified mood (affective) disorder (H)  Mood is stable.  Previously on Sertraline, but does not feel this is necessary at this time.     3. Stress incontinence  Discussed some home exercises to consider.  We did discuss a referral for pelvic floor therapy, but patient declines at this time.  She will let me know if she changes her mind.    Next follow up:  Return in about 1 year (around 2/21/2021) for Physical.    Immunization Review  Adult Imm Review: Due today, orders placed  BMI: 33.40    I discussed the following with the patient:   Adult Healthy Living: Importance of regular exercise  Healthy nutrition  Getting adequate sleep  Stress management  Contraception options      Subjective:   Chief Complaint: Tami Malik is an 24 y.o. female here for a preventative health visit.     HPI: Patient is  with 2 children.  She is 6 months postpartum with her second daughter.  Her pregnancy and delivery went well.  Patient is working full-time.    Patient is breast-feeding.  She has not started getting regular menstrual cycles yet.  She is on a progesterone only OCP right now.  She did have an abnormal Pap 2 years ago and subsequently had a colposcopy.  She tells me this was normal.  She had a repeat Pap last year which was normal.  Patient would like to start the HPV vaccination series.  She has not had these previously.  She does have a history of HPV.    Patient previously on sertraline for some irritability and mood changes after her last child.  She is no longer on this.  States her mood has been stable.  Her anxiety has been a little higher, but she feels like she is managing this well.    She is exercising regularly.  She is little frustrated with her lack of weight loss postpartum.    Continues to have some stress incontinence with coughing and  "sneezing.        Healthy Habits  Are you taking a daily aspirin? No  Do you typically exercising at least 40 min, 3-4 times per week?  Yes  Do you usually eat at least 4 servings of fruit and vegetables a day, include whole grains and fiber and avoid regularly eating high fat foods? Yes  Have you had an eye exam in the past two years? Yes  Do you see a dentist twice per year? Yes  Do you have any concerns regarding sleep? No    Safety Screen  If you own firearms, are they secured in a locked gun cabinet or with trigger locks? The patient does not own any firearms  Do you feel you are safe where you are living?: Yes (2/21/2020 10:03 AM)  Do you feel you are safe in your relationship(s)?: Yes (2/21/2020 10:03 AM)      Review of Systems:  Please see above.  The rest of the review of systems are negative for all systems.     Pap History:   Yes - updated in Problem List and Health Maintenance accordingly  Cancer Screening       Status Date      PAP SMEAR Next Due 1/23/2022      Done 1/23/2019 GYNECOLOGIC CYTOLOGY (PAP SMEAR)     Patient has more history with this topic...          Patient Care Team:  Katerine Feliz NP as PCP - General (Family Medicine)  Katerine Feliz NP as Assigned PCP        History     Reviewed By Date/Time Sections Reviewed    Katerine Feliz NP 2/21/2020 10:13 AM Tobacco    Katerine Feliz NP 2/21/2020 10:08 AM Tobacco    Lanette Olvera MA 2/21/2020 10:03 AM Tobacco            Objective:   Vital Signs:   Visit Vitals  /74   Pulse 84   Ht 5' 5.5\" (1.664 m)   Wt 203 lb 12.8 oz (92.4 kg)   Breastfeeding Yes   BMI 33.40 kg/m           PHYSICAL EXAM  General Appearance: Alert, cooperative, no distress, appears stated age  Head: Normocephalic, without obvious abnormality, atraumatic  Eyes: PERRL, conjunctiva/corneas clear, EOM's intact. Wearing corrective glasses.  Ears: Normal TM's and external ear canals, both ears  Nose: Nares normal, septum midline,mucosa normal, no drainage  Throat: " Lips, mucosa, and tongue normal; teeth and gums normal  Neck: Supple, symmetrical, trachea midline, no adenopathy;  thyroid: not enlarged, symmetric, no tenderness/mass/nodules  Back: no CVA tenderness  Lungs: Clear to auscultation bilaterally, respirations unlabored  Heart: Regular rate and rhythm, S1 and S2 normal, no murmur, rub, or gallop  Abdomen: Soft, non-tender, bowel sounds active all four quadrants,  no masses, no organomegaly  Extremities: Extremities normal, atraumatic, no cyanosis or edema  Skin: Skin color, texture, turgor normal, no rashes  Lymph nodes: Cervical, supraclavicular nodes normal  Neurologic: Normal   Psychologic: appropriate affective, answers all of my questions appropriately. No hallucinations, delusion, or suicidal ideations.    Katerine Feliz, NP

## 2021-06-08 ENCOUNTER — PRENATAL OFFICE VISIT - HEALTHEAST (OUTPATIENT)
Dept: MIDWIFE SERVICES | Facility: CLINIC | Age: 26
End: 2021-06-08

## 2021-06-08 DIAGNOSIS — O48.0 POST-TERM PREGNANCY, 40-42 WEEKS OF GESTATION: ICD-10-CM

## 2021-06-08 DIAGNOSIS — O26.03 EXCESSIVE WEIGHT GAIN DURING PREGNANCY IN THIRD TRIMESTER: ICD-10-CM

## 2021-06-08 DIAGNOSIS — Z34.83 ENCOUNTER FOR SUPERVISION OF OTHER NORMAL PREGNANCY IN THIRD TRIMESTER: ICD-10-CM

## 2021-06-08 ASSESSMENT — MIFFLIN-ST. JEOR: SCORE: 1714.04

## 2021-06-12 ENCOUNTER — AMBULATORY - HEALTHEAST (OUTPATIENT)
Dept: FAMILY MEDICINE | Facility: CLINIC | Age: 26
End: 2021-06-12

## 2021-06-12 DIAGNOSIS — O48.0 POST-TERM PREGNANCY, 40-42 WEEKS OF GESTATION: ICD-10-CM

## 2021-06-12 NOTE — PROGRESS NOTES
"PRENATAL VISIT   TRANSFER OF CARE  FIRST OBSTETRICAL EXAM - OB    Assessment / Impression     , Normal first prenatal visit at 22w4d  S>D    Plan:        1. Transfer of care visit.  Transferred care from [facility].  Patient had 2 visits after starting care on 17 at 8 6/7 weeks gestation.  2. Review of her records revealed the following complications: abnormal pap with condyloma and yeast, LSIL  3. IOB labs reviewed, normal  4. Pap smear 2017  5. GC/CT neg  6. Lead screen not done  7. Medications: Prenatal vitamins. Encouraged a vitamin D3, an omega 3 fatty acid supplement daily as well.   8. Problem list reviewed and updated.  9. Genetic screening: discussed and declined.  10. Role of ultrasound in pregnancy discussed; dating/viability ultrasound ordered  11. Oriented to [facility] care and philosophy;  group, on-call and contact info discussed.  12. Appropriate anticipatory guidance including nutrition & supplements, weight gain recommendations, exercise, resources, lab testing & warning signs discussed.  .  13. Follow up: 4 weeks    TT with patient 40 minutes >50% time spent in counseling or coordination of care.    Subjective:    Tami Malik is a 22 y.o.  here today for her First Obstetrical Exam. Here with her  Ej   Patient's last menstrual period was 2017 (exact date).  Last period was normal.   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  They will be moving in the next week.  Living with his mother at this time.  Felt quickening 1 month ago.  Had morning sickness and treated with diet changes and small meals.  Exercises 1-2x/week at the gym.  Hx of chlamydia 5 years ago, not since.   is [place] descent with Mediterranean background ([place]).  's mother states all children \"were tested after birth\" and he was negative for any genetic disorder.  Will plan OGT, HGB and RPR testing next visit.    Current symptoms also include: " "none.    OB History    Para Term  AB Living   1        SAB TAB Ectopic Multiple Live Births             # Outcome Date GA Lbr Stanislaw/2nd Weight Sex Delivery Anes PTL Lv   1 Current                   2017, by Last Menstrual Period  Past Medical History:   Diagnosis Date     Abnormal Pap smear of cervix      Allergic     seasonal     Chlamydia     5 years ago     Urinary tract infection     x1     Varicella     as child     Past Surgical History:   Procedure Laterality Date     MOUTH SURGERY      had 4 molars removed     WISDOM TOOTH EXTRACTION      + 4 other molars     Social History   Substance Use Topics     Smoking status: Never Smoker     Smokeless tobacco: Never Used     Alcohol use No     Current Outpatient Prescriptions   Medication Sig Dispense Refill     OMEGA-3/DHA/EPA/FISH OIL (FISH OIL-OMEGA-3 FATTY ACIDS) 300-1,000 mg capsule Take 1 g by mouth daily.       prenat.vits,nafisa,min-iron-folic (PRENATAL VITAMIN) Tab Take 1 tablet by mouth daily.       No current facility-administered medications for this visit.      No Known Allergies          High Risk Behavior: None    Review of Systems  General:  Denies problem  Eyes: Denies problem  Ears/Nose/Throat: Denies problem  Cardiovascular: Denies problem  Respiratory:  Denies problem  Gastrointestinal:  Denies problem, Genitourinary: Denies problem  Musculoskeletal:  Denies problem  Skin: Denies problem  Neurologic: Denies problem  Psychiatric: Denies problem  Endocrine: Denies problem        Objective:   Objective    Vitals:    17 1328   BP: 102/66   Pulse: 84   Resp: 16   Weight: 161 lb 4.8 oz (73.2 kg)   Height: 5' 4.5\" (1.638 m)     Physical Exam:  General Appearance: Alert, cooperative, no distress, appears stated age  Skin: Skin color, texture, turgor normal, no rashes or lesions  Abdomen: gravid, non-tender measuring 7 cm above umbilicus  Extremities: Extremities normal without varicosities or edema      Lab:   Results for orders " placed or performed in visit on 07/26/17   OB External Results   Result Value Ref Range    ABO/Rh External Result A Positive     Antibody External Result Negative     Hemoglobin External Result 13.2 11.8 - 15.5    Rubella External Result Immune     HBsAg External Result Non-reactive     HIV External Result Non-Reactive     Platelet Count External Result 313 140 - 450    RPR External Result Non-Reactive

## 2021-06-12 NOTE — PROGRESS NOTES
"Tami Malik is a 25 y.o. female who is being evaluated via a billable video visit.      The patient has been notified of following:     \"This video visit will be conducted via a call between you and your physician/provider. We have found that certain health care needs can be provided without the need for an in-person physical exam.  This service lets us provide the care you need with a video conversation.  If a prescription is necessary we can send it directly to your pharmacy.  If lab work is needed we can place an order for that and you can then stop by our lab to have the test done at a later time.    Video visits are billed at different rates depending on your insurance coverage. Please reach out to your insurance provider with any questions.    If during the course of the call the physician/provider feels a video visit is not appropriate, you will not be charged for this service.\"    Patient has given verbal consent to a Video visit? Yes  How would you like to obtain your AVS? AVS Preference: MyChart.  If dropped by the video visit, the video invitation should be sent to: Text to cell phone: [phone]  Will anyone else be joining your video visit? No        Additional provider notes:   S: Tami is a g who presents with LMP of 2020 with an COLLETTE of 21 at 9w1d.  She has concerns for nausea. Worse in AM and again in the afternoon. She does have emesis. She is able to keep down food and fluids but general measures like food avoidance are not helping much. She had nausea like this with both last pregnancies and Zofran helped her. She denies any bleeding or pelvic pain with this pregnancy. She denies dizziness or lightheartedness.    O: No vitals taken for this visit.   Well appearing female ( sitting in her car) in no acute distress.   A: N/V in early pregnancy.      Pregnancy of unknown location      Hx of Preeclampsia   P: Rx for Zofran. Did talk about the safety profile and offered Reglan. She " would like Zofran.        Ultrasound for dating/viaibility       Reviewed warning signs for 1st trimester.         Has IOB scheduled.   MARNIE King CNM      Video-Visit Details  Type of service:  Video Visit    Video End Time (time video stopped):840  Originating Location (pt. Location): car    Distant Location (provider location):  [facility] [place]     Platform used for Video Visit: [facility]      MARNIE King CNM

## 2021-06-12 NOTE — PROGRESS NOTES
Tami is here with her  Donovan today.  She is getting over a cold and has a sunburn on her stomach.  They moved into their new home and I looking forward to getting their crib and the baby's room ready this weekends.  They are doing online prenatal classes which they plan to start later this week.  When discussing her plans for pain relief and labor she reports that she does not want to do an epidural and is asking what other options there are.  We did discuss nitrous oxide, IV pain medication, massage, guided imagery, hydrotherapy, water birth, and breathing techniques.  I did encourage them to start with relaxation and coping techniques several weeks ahead of time so that they can get used to how that works with her body.  She reports that she is having some white vaginal discharge without irritation or odor.  We discussed the role of leukorrhea in pregnancy.  Denies bleeding, cramping, leaking of fluid and reports active fetal movements.  Will do diabetes screening, hemoglobin, and RPR today.

## 2021-06-13 ENCOUNTER — COMMUNICATION - HEALTHEAST (OUTPATIENT)
Dept: SCHEDULING | Facility: CLINIC | Age: 26
End: 2021-06-13

## 2021-06-13 NOTE — PATIENT INSTRUCTIONS - HE
Welcome to [facility]   and thank you for choosing us for your maternity care provider!  Congratulations!    [facility]  Contact information:  Appointment line and to get a hold of CNM in clinic Monday-Friday 8 am - 5 pm:  [phone].  There are some clinics with early start times (1st appointment 7:40 am) and others with evening hours (last appointment 6:20 pm).  Most are typically open from 8 am to 5 pm.    CNM on call answering service: [phone].  Specify your hospital of choice and leave a brief message for CNM;  will then page CNM who is on call at your specified hospital and you should receive a call back with 15 minutes.  Be sure that your ringer is audible and that you can accept blocked calls so that we can get back in touch with you! This number should be reserved for urgent needs if during the day, before 8 am, after 5 pm, weekends, holidays.      Pregnancy: Body Changes  From conception (fertilization) until after the birth of your child, you and your baby will change every day. To help you understand what is happening, we ve outlined how pregnancy begins and some of the changes you may notice.  How Pregnancy Begins  Conception is the union of a sperm and an egg. When it occurs, your baby s genetic makeup is complete, even its sex. Fertilization takes place in the fallopian tube. The fertilized egg then travels down this tube to the uterus (womb). The egg attaches to the lining of the uterus about a week later. There it grows and is nourished.    Your Changing Body  Pregnancy affects almost every part of your body. You may notice some of the following physical and emotional changes:    Your uterus expands outward and upward as your baby grows. You may feel pressure on your bladder, stomach, and other organs.    You may notice skin color changes on your forehead, nose, and cheeks. A dark line may form from your bellybutton  down to your pubic area. The skin color around your nipples and thighs may also change.    Pink stretch marks may appear on your abdomen, breasts, or hips.    Your hair may seem thicker. You lose less hair during pregnancy.    You may feel fine one day and weepy the next. This is caused by changes in your body, such as increased hormones (chemicals that affect the function of certain organs and also your moods).      Adapting to Pregnancy: First Trimester  As your body adjusts, you may have to change or limit your daily activities. You ll need more rest. You may also need to use the energy you have more wisely.  Eat stomach-friendly foods like cottage cheese, crackers, or bread throughout the day.    Your Changing Body  Almost every part of your body is affected as you adapt to pregnancy. The uterus and cervix will begin to soften right away. You may not look very pregnant during the first three months. But you are likely to have some common signs of early pregnancy:    Nausea    Fatigue    Frequent urination    Mood swings    Bloating of the abdomen    Missed or light periods (first trimester bleeding)    Nipple or breast tenderness, breast swelling      It s Not Too Late to Start Good Habits  What matters most is protecting your baby from this moment on. If you smoke, drink alcohol, or use drugs, now is the time to stop. If you need help, talk with your health care provider.    Smoking increases the risk of stillbirth or having a low-birth-weight baby. If you smoke, quit now.    Alcohol and drugs have been linked with miscarriage, birth defects, intellectual disability, and low birth weight. Do not drink alcohol or take drugs.    Tips to Relieve Nausea  Although nausea can occur at any time of the day, it may be worse in the morning. To help prevent nausea:    Eat small, light meals at frequent intervals.    Get up slowly. Eat a few unsalted crackers before you get out of bed.    Drink water with lemon  slices.    Eat an ice pop in your favorite flavor.    Ask your health care provider about taking johnie or vitamin B6 for nausea and vomiting.    Talk with your health care provider if you take vitamins that upset your stomach.    Work Concerns  The end of the first trimester is a good time to discuss working during pregnancy with your employer. Follow your health care provider s advice if your job requires you to stand for a long time, work with hazardous tools, or even sit at a desk all day. Your workspace, workload, or scheduled hours may need to be adjusted. Perhaps you can change body postures more often or take an extra break.  Advice for Travel  Talk to your health care provider first, but the second trimester may be the best time for any travel. You may be advised to avoid certain trips while you re pregnant. Food and water can be concerns in developing countries. Travel by car is a good choice, as you can stop, get out, and stretch. Bring snacks and water along. Fasten the lap belt below your belly, low over your hips. Also be sure to wear the shoulder harness.  Intimacy  Unless your health care provider tells you to, there is no reason to stop having sex while you re pregnant. You or your partner may notice changes in desire. Desire may be less in the first trimester, due to nausea and fatigue. In the second trimester, sex may be very enjoyable. The third trimester can be a challenge comfort-wise. Try different positions and see what s best for you both.      Pregnancy: Your First Trimester Changes  The first trimester is a time of rapid development for your baby. Because your baby is growing so quickly, it is important that you start a healthy lifestyle right away. By the end of the first trimester, your baby has formed all of its major body organs and weighs just over an ounce.    Month 1 (Weeks 1-4)  The placenta (the organ that nourishes your baby) begins to form. The heart and lungs begin to develop.  Your baby is about 1/4 inch long by the end of the first month.    Month 2 (Weeks 5-8)  All of your baby s major body organs form. The face, fingers, toes, ears, and eyes appear. By the end of the month, your baby is about 1 inch long.    Month 3 (Weeks 9-12)  Your baby can open and close its fists and mouth. The sexual organs begin to form. As the first trimester ends, your baby is about 4 inches long.      Pregnancy: Your Weight  Being a healthy weight is important for both you and your baby. The weight you gain now is not just extra fat. It is also the weight of your baby. And it is the increased blood and fluids to support the baby. A slow, steady rate of gain is best. How much you should gain depends on your weight before getting pregnant. Check with your health care provider to find out what is right for you.    If You Gain Too Much  Gaining too much weight might cause you to feel tired or you could have a harder pregnancy or birth. If you and your health care provider decide you re gaining too much:    Eat fewer fats and sugars. Instead, eat fruit, vegetables, and whole-grain foods.    Drink plenty of water between meals.    Get at least 20 minutes of light exercise, such as walking, each day.    Don t diet. You might not get enough of the nutrients you or your baby needs.    Keep a diet diary to help you gauge what and how much you are eating .    If You re Not Gaining Enough  If you don t gain enough, your baby could be too small or have health problems. Women tend to gain most of their weight in the second and third trimesters. For now:    Eat many types of foods. Make sure you get enough calcium, protein, and carbohydrates.    Don t skip meals.    Eat healthy snacks.    Pick nutrient-dense, high calorie healthy food like trail mix or protein shakes.    See a dietitian for help.    Talk to your healthcare provider if you have had an eating disorder or problems with certain foods.      Pregnancy: Common  Questions  There are plenty of myths and  old wives  tales  surrounding pregnancy. You may need help  fact from fiction. On this sheet, you ll find answers to a few common questions. If you have other questions, talk with a midwife.    Will Working Harm My Baby?  In most cases, working throughout your pregnancy is not harmful at all. There may be concerns if the job involves dangerous machinery or chemicals, lifting, or standing for very long periods of time. Talk to your health care provider and employer about your particular job and pregnancy.  Why Can t I Change the Cat Litter Box?  Cats carry a disease called toxoplasmosis. In adult humans, it shows up as a mild infection of the blood and organs. If you are infected during pregnancy, the baby s brain and eyes could be damaged. To be safe, have someone else change the litter. If you must handle it, wear a paper mask over your nose and mouth. Also, wear gloves and wash your hands afterward.  Which Medications Are Safe?  No prescription or over-the-counter drug is safe for everyone all of the time. But sometimes medications are needed. Be sure your health care provider knows you are pregnant. Then use only the medications he or she advises you to take. Please refer to the below resources for further information and discuss concern and questions with your midwife.  Is It True That I Can Overheat My Baby?  Yes. To avoid making your baby too warm:    Don t sit in a jacuzzi. A long, warm bath is fine, but not in water over 100 F.    Exercise less intensely if you feel fatigued. Base your workout on how you feel, not your heart rate. Heart rates aren t a good way to measure effort during pregnancy.  Can I Lift and Carry Safely?  Yes, if your health care provider doesn t tell you otherwise. Learn to lift and carry safely to avoid injury and reduce back pain during pregnancy. To protect your back:    Bend at the knees to bring the load nearer.    Get a good  . Test the weight of the load.    Tighten your abdomen. Exhale as you lift.    Lift with your legs, not with your back.    Carry the load close to your body.    Hold the load so you can see where you are going.  What If I Get Sick?  Most women get sick at least once during pregnancy. Talk with your health care provider if you do. Most likely it will not affect your pregnancy. Get plenty of rest and fluids, and eat what you can. Talk to your health care provider before taking any medications.        HEALTHY PREGNANCY CARE: 10-14 WEEKS PREGNANT     By weeks 10 to 14 of your pregnancy, the placenta has formed inside your uterus. It may be possible to hear your baby's heartbeat with a doppler ultrasound device. Your baby's eyes can and do move. The arms and legs can bend.    GENETIC SCREENING OPTIONS AT [facility]                All testing is optional. We don t recommend or discourage any test; it is totally up to you and your partner. Some couples wish to know their risk of having a baby with a genetic defect and others do not. We will support your decision. Abnormal results may lead to a discussion of options for further testing.    Accurate dating of your pregnancy is important for all testing so an ultrasound may be done prior to referral or testing.    No testing provides certainty; there are false positives and negatives associated with all testing, some more than others.    Most genetic testing is non-invasive (requires only a blood sample and sometimes an ultrasound or both).    It is always wise to check with your insurance carrier before proceeding.    Some testing can be done at our lab and some require a referral.    If you decide to do no testing, the 20 week ultrasound scan, which is a routine or standard ultrasound, has some ability to detect abnormalities in the baby, and identifies obstetric problems.    If you are over 35, you will have the option of a Level II ultrasound, which is a more  detailed and targeting scan, that helps detect fetal anomalies as well as obstetric problems.      If you have a more complex family history of chromosomal abnormalities, a referral to a genetic counselor and/or a Maternal Fetal Medicine specialist, to help identify available tests, may be recommended.    TYPES OF GENETIC TESTING AVAILABLE INCLUDE:    Carrier Screening/Testing for Genetic Conditions    There are many inherited conditions for which testing or carrier testing is available. Carrier screening can test for conditions like Cystic Fibrosis, Thalassemia, Roland Sachs, Sickle Cell, Hemophilia, Muscular Dystrophy, Seth s disease and many others.     Cystic Fibrosis (CF) affects both males and females and people from all racial and ethnic groups. However, the disease is most common among Caucasians of Northern  descent. CF is also common among Latinos and American Indians. The disease is less common among  Americans and  Americans. More than 10 million Americans are carriers of a faulty CF gene and many of them don't know that they are CF carriers. One or both parents can be tested any time before or during pregnancy.    Talk to your care provider about your family history and whether you should be screened. A referral to a genetic counselor at [facility] or [facility] can be made by your care provider at any time.    This is also tested for in the  Metabolic Screen that your infant receives 24 hours after birth.     Fetal DNA cell Testing: [place] or Innatal (Non-Invasive Prenatal Testing)    At 10 wk or greater, a blood sample can be drawn here at clinic or at any of our referral offices. It will provide highly accurate results with low false positive rates for trisomy 18, trisomy 21 (Down Syndrome), and trisomy 13 (> 99% trisomy detection rate at a false positive rate of <0.1%). Gender identification can also be obtained if desired (98% accuracy).  Can also  detect some sex-linked chromosomal abnormalities (80-90% accuracy).  This is often done if one of the other screening tests is abnormal.        1st Trimester Screening:     Everyone has an age related risk of having a baby with a genetic abnormality. This testing provides a risk profile which is better than assigning risk based solely on your age.    Refines your risk of having a baby with a chromosomal abnormality such as Trisomy 21 & 18.    This test with detect a fetus with one of these disorders about 85% of the time.    A thickened nuchal fold can also be associated with cardiac defects.    This test requires a blood collection combined with ultrasound to obtain a measurement of fluid at back of baby s neck (nuchal translucency).    False positive results occur approximately 5% of cases.    An additional blood sample may be necessary in order to calculate your risk of having a baby with a neural tube defect (e.g. spina bifida).  This is called the AFP test (alpha fetal protein).  An ultrasound should be able to  any spinal defect as well.    Follow-up of an abnormal test may include a more extensive ultrasound study and you may be offered an amniocentesis (a small sample of amniotic fluid is withdrawn and studied) for a definitive diagnosis    Quad Screen (4-marker screen)    Between 15 and 21 weeks, a sample of blood can be drawn at our lab to assess your risk of having a baby with Down Syndrome, Trisomy 18 and neural tube defects. Such testing is able to detect these conditions in 80% of cases and the false-positive rate is approximately 5%.     Follow-up of an abnormal test may include a more extensive ultrasound study and you may be offered an amniocentesis (a small sample of amniotic fluid is withdrawn and studied) for a definitive diagnosis      Referrals opportunities include:    [facility],  [facility], [facility]  o Offers nuchal translucency ultrasound; does not  offer genetic counseling.    [place]  [facility] and [facility] ([facility]):   o Approximately an hour long visit includes 30 min with genetic counselor who discusses all testing available and which ones might be beneficial to you based on age, personal and family history.    o Blood will be drawn and the nuchal translucency ultrasound will be discussed and performed if desired. The Free Fetal DNA testing ([place]/[employer]) can be drawn also.  o Targeted or detailed Level II ultrasounds are also available with these perinatology groups.        Breastfeeding: a Healthy Option for You and Your Baby  Consider breastfeeding for the healthiest way to feed your baby. Ask your midwife or physician for more information.     The choice of how you will feed your baby is important.  Before your baby s birth, you ll want to learn about the benefits of breastfeeding.  [facility] have been designated Baby Friendly; an initiative that was created by the World Health Organization and UNICEF.  This helps give you and your baby the best start in feeding their baby.    Why should I breastfeed my baby?    Babies are less likely to develop childhood obesity or diabetes    Babies are less likely to suffer from recurrent ear infections    Babies are less likely to be hospitalized for respiratory conditions    Breast milk is rich in nutrients and antibodies-it is easy to digest    How does it benefit me?    Lowers the risk for diabetes, breast and ovarian cancer and postpartum depression    Moms can lose  baby weight  more quickly    Cost savings - formula can cost well over $1,500 per year    Convenient - no bottles and nipples to sterilize, no measuring and mixing formula    The physical contact with breastfeeding can make babies feel secure, warm and comforted     What about formula?  While you and your baby are staying with us at [facility], we will support whatever feeding choice you make for your baby.     Some important considerations:      The American Academy of Pediatrics, the World Health Organization, and many more organizations recommend exclusive breastfeeding for 6 months and continued breastfeeding while adding other foods for the first 1-2 years.      Any amount of breastmilk has benefits to both baby and mother.    Giving formula in replacement of breastfeeding can affect mother s milk supply.  If formula is needed, hospital staff will work with you on a plan to help develop your milk supply.    Formula alters the natural growth of good bacteria in the  stomach.     Research has found that first time mothers who offer formula in the hospital have a shorter duration of breastfeeding.    How can I start to prepare?     Start by having a conversation with your medical provider.     Talk with your partner, family and friends.     Attend a prenatal class that includes breastfeeding preparation. Birth and breastfeeding classes are offered by [employer]. Visit [employer] for class information.     After your baby s birth, hospital staff and lactation consultants will help you and your baby get off to a great start with breastfeeding.    As your center of gravity and weight changes, use good body mechanics when changing positions and lifting. For example, use a straight back and your legs for support when lifting instead of bending over. Maintain good posture to prevent straining your muscles. Now is a good time to continue or restart your exercise program. Walking 30-60 minutes daily is an excellent way to keep fit. Yoga and swimming also offer many benefits.    The nausea and fatigue of early pregnancy have usually started to let up, so this is a good time to focus on nutrition. Consider attending a nutrition class. A healthy diet includes about 60 grams of protein each day (3-4 servings of dairy, 2-3 servings of meat/fish/poultry/nuts), 4-6 servings of whole grain foods, and 5-6  servings of fruits and vegetables. Remember to drink 6-8 glasses of water daily.    Watch for warning signs, such as     vaginal bleeding    fluid leaking from your vagina    severe abdominal pain    nausea and vomiting more than 4-5 times a day, or if you are unable to keep anything down    fever more than 100.4 degrees F.       RESOURCES   You can refer to the Starting Out Right book or find it online at [email or web address]/images/stories/[email or web address]-[email or web address]-Right.pdf or http://www.[email or web address]/images/stories/flipbooks/[facility]-starting-out-[email or web address]-starting-out-right.html#p=[email or web address]    You can sign up for a weekly parenting e-mail that gives support, tips and advice from health care professionals that starts with pregnancy and continues through the toddler years. To register, go to [email or web address] at any time during your pregnancy.    Breastfeeding:    OUTPATIENT LACTATION RESOURCES     -Schedule an appointment with a [facility] Nurse [facility] who is also a Lactation Consultant by calling [phone]. We see women for breastfeeding visits at [facility] and [facility].     -Baby Café    Pregnant and interested in breastfeeding?  Need answers to breastfeeding questions?  Want to help breastfeeding moms?  Already breastfeeding and want to meet other moms?    Join us at the Baby Café!    Baby Cafe is a free, drop-in service offering breast-feeding support for pregnant women, breast-feeding mothers and their families.  Come share tips and socialize with other mothers.  Babies and siblings are welcome (no childcare available).    Starting April 2018, Baby Café will be at 4 locations.  Please see below for the Baby Café closest to you! Hmong, Uzbek, and Marshallese which is may be available at some sites.      [facility]  [address] [place] [address]  1st Wednesday: 10am-12pm    [address] [place] [address]  3rd Wednesday 4-6pm    [facility]  [address] [place] [address]  4th Wednesday 10am-12:30pm    [facility]  [address] [place] [address]  4th Wednesdays: 4-6pm    -Attend a baby weigh in at [facility].  Lactation consultants are available to answer questions  [place]: Tuesdays 1:00 - 2:00  [facility], [facility]: Mondays 1:00 - 2:00   [email or web address]    -Attend one of the New Mama groups at [facility] in [facility].  [facility] also offers one-on-one in home and in office lactation consults.   [email or web address]    -Attend a [facility] League meeting.  Multiple groups in several locations throughout the [facility]. The meetings are no-cost and always informative breastfeeding education session through [employer] La Leche League  Www.londas.[email or web address]/     Held at [facility] the second Thursday of each month at 7pm    Childbirth and Parenting Education:   [facility]: http://CyberArts/   ([phone] 496-BABY  [place]: (education, yoga & wellness) www.[facility]  [facility]: www.[email or web address]   Childbirth collective: (Parent topic nights)  www.childbirthcollective.org/  Hypnobabies:  www.hypnobabiestwincities.com/  Hypnobirthing:  Http://hypnobirthing.com/  The Birth Hour: [email or web address]/online-childbirth-class/    APPS and Podcasts:   [place]  Glow Nurture  The Birth Hour (for birth stories)   Birthful   Expectful   The Longest Shortest Time  PregnancyPodcast Michelle Hyde  Book Recommendations:   Makayla Xavi's Birthing From Within--first few chapters include a new-age tone, you may prefer to skip it and keep going, because there is good stuff later.  This book recommendation covers emotional preparation, but does cover coping with pain, and use of both pharmacological and nonpharmacological methods.    Dr. Garcia' The Pregnancy Book and The Birth Book--the pregnancy book goes  "month-by month    Womanly Art of Breastfeeding by La Leche League International   Bestfeeding by Lalita Gibbs--great pictures    Mothering Your Nursing Toddler, by Vidhi Enriquez.   Addresses dealing with so many of the challenging behaviors of a nursing toddler.  How Weaning Happens, by La Leche League.  Discusses weaning at all ages, from medically necessary weaning of an infant, all the way up to age 5 (or older), with why/why not, and strategies.  Very empowering book both for deciding to wean and deciding not to.    American College of Nurse-Midwives (ACNM) http://www.midwife.org/; look at the informational handouts at http://www.midwife.org/Share-With-Women     www.mymidwife.org    Mother to Baby (Medication and Herbal guidance in pregnancy): http://www.mothertobaby.org  Toll-Free Hotline: 264.735.5912  LactMed (Medication use while breastfeeding): http://toxnet.nlm.nih.gov/newtoxnet/lactmed.htm    Women's Health.gov:  http://www.womenshealth.gov/a-z-topics/index.html    American pregnancy association - http://americanpregnancy.org    Centering Pregnancy (group prenatal care option): http://centeringhealthcare.org    Information about doulas:  Childbirth collective: http://www.childbirthcollective.org/  [facility]oul[facility] of [place] (CATHERINE):  www.catherine.org  [facility]  project: http://twincitiesdoulaproject.com/     Early Childhood and Family Education (ECFE):  ECFE offers parents hands-on learning experiences that will nourish a lifetime of teachable moments.  http://ecfe.info/ecfe-home/    March of Dimes www.Sara Campbell.[employer]     FDA - Nutrition  www.mypyramid.gov  Under \"For Consumers,\" click on \"pregnant and breastfeeding women.\"      Centers for Disease Control and Prevention (CDC) - Vaccines : http://www.cdc.gov/vaccines/       When researching information on the web, question the validity of websites.  The domains .gov, .edu and.org tend to be more reliable information.  If there are a lot of " advertisements, be cautious of the information provided. Stay away from blogs and chat rooms please!      Nutrition & supplements:     Prenatal vitamin (those with 600-1000 mcg folic acid and 27 mg of iron are enough).  Take with food or Juice     4-5 servings of dairy or other calcium rich foods (fish, leafy greens, soy) per day - if not, take 500-1000 mg additional calcium (Tums, pills, chews). Take with dairy     Vitamin D3 7805-5444 IU geltab daily.  Take with fattiest meal.  Look for fortified foods also (Dairy, Juice)     2-3 (4) oz servings of fish, seafood, nuts (walnuts & almonds), oils, avocado per week - if not, take Omega 3 Fatty acids: DHA & WINTER 8491-7494 mg per day.  Other names: cod liver oil, fish oil. Take with fattiest meal.  Some prenatals have DHA, but typically not a sufficient dose.    Fish: Do not eat shark, swordfish, iesha mackerel, or tilefish when you are pregnant or breastfeeding.  They contain high levels of mercury.  Limit white (albacore) tuna to no more than 6 ounces per week. Http://www.fda.gov/downloads/ForConsumers/ConsumerUpdates/LVD985125.pdf         Touring the Maternity Care Center  At this time we are offering a virtual tour of the Maternity Care Centers at both [facility] and [facility]:   [facility]: [email or web address]  [place]:   [email or web address]  [email or web address]  When in person tours become available, registrations is required. To schedule a tour at either [facility] or [facility], please do so online using the following links:  [facility] - [email or web address]  [facility] [email or web address]      You are invited to  Meet the [facility]    A way to tour the hospital Labor and Delivery unit and meet the midwives in our group was postponed at the start of hospital restrictions following COVID-19. We will resume these when able and virtual options may be available in the future.     Please call [phone] for ongoing updates.

## 2021-06-13 NOTE — PROGRESS NOTES
Tami is doing well.  EPDS 5 today.  Reports swelling in BLE below knee.  Drinking water and using compression stockings.  Also recommended reducing sodium, elevating feet, ambulation.  Having some low back pain to left of spine.  Going to chiro, taking baths, and using support belt.  Recommended massage, heat/ice, tylenol as needed.  Reports 3-4 BH ctx/day.  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  Discussed, although SVE doesn't predict labor, she may want to know her SVE weekly to assess changes.  Noted likelihood of birth would be between 39-41 weeks.  Believes she has hemorrhoids.  No current bleeding. Has used preparation H once daily, fiber,and increased water. Recommended increasing preparation H to BID x 6 days and adding soaks and Tucks pads for comfort.  Active FM.  No s/s PTL or preE.

## 2021-06-13 NOTE — PROGRESS NOTES
Here alone today. Feeling well generally but tired after long week at work. Oldest daughter is potty training. Has not noted quickening yet. Wondering about methods to improve Insomnia; advised Magnesium HS, reviewed dosing. Right sided ovarian cyst with some discomfort with palpation but resolved quickly; advised will monitor size at next planned US but if pain is persistent or severity indicates sooner evaluation that is warranted based on size of the cyst at her dating ultrasound. Continues with Nausea and vomiting, is trying to manage with crackers or toast; advised on increasing protein sources at night before bed and first thing in AM, consider trying B6 and unisom and using zofran only smallest dose for shortest duration. She is currently using zofran about 3-4/week in AM only; desires refill and this was ordered. Advised on timing of mid-pregnancy ultrasound; ordered for [facility] per pt preference. Advised on timing of next CNM visit as well. Reviewed warning s/sx and reasons to call. RTC 5 weeks.

## 2021-06-13 NOTE — TELEPHONE ENCOUNTER
"Pt called CNM noting vision changes in R eye.  States peripheral vision looks like she is \"looking through broken glass\" on right side.  Denies HA.  Had mild HA last night.  Denies fever/chills currently.  Had mild fever last night, 99.9 and 100.8 per temporal scanner.  Currently 11 weeks pregnant.  Denies otherwise feeling ill.  Denies pain/VB.  States she has had one ocular migraine in the past, thought it presented somewhat different.  Discussed option of short monitoring period, rest, PO fluids, Tylenol.  Present to ER if no improvement or if symptoms worsen vs presenting to ER now.  Pt plans to monitor briefly at home.  Encouraged to call back with questions/concerns prn.  "

## 2021-06-13 NOTE — PROGRESS NOTES
Tami is her with her  Donovan today.  She reports feeling well.  Her only question today is pulling and an itching sensation in her low abdomen.  She has recently gotten stretch marks which seemed to come on suddenly.  We discussed the use of a pregnancy support belt which might help with a pulling sensation if she feels it is due to abdominal heaviness.  Also recommended for itching and over-the-counter hydrocortisone cream and the use of pine tar soap.  She does deny any signs or symptoms of vaginal infection or urinary tract infection at this time.  She and her  have started online prenatal classes.  She also is going to the chiropractor and is doing this weekly.  I recommended they asked the chiropractor if here she does the Caraballo technique and if they thought that would be good for her in the weeks up until her birth.  She has no signs or symptoms of  labor or preeclampsia.  Active fetal movement.

## 2021-06-13 NOTE — PROGRESS NOTES
PRENATAL VISIT   FIRST OBSTETRICAL EXAM - OB    Assessment / Impression     1. IOB visit with Tami, , at 11w 3d gestation by certain LMP 20  2. History of preeclampsia and postpartum hemorrhage  3. Nausea in first trimester of pregnancy      Plan:   -Renew prescription of ondansetron 4-8 mg orally every 8 hours to be used as needed for nausea.   -Discussed plan to discontinue ondansetron as nausea improves.   - IOB labs drawn.  -Pt is not interested in drawing lead level.  -Patient is interested in waterbirth. Hep C drawn today. Aware of restrictions if hypertensive.  -Reviewed prenatal care schedule and discussed routine OB visits versus problem visits and referrals. Also discussed use of ultrasound in pregnancy.  -Reviewed results of early US; reviewed dating. Dating by LMP.   -Optimal nutrition and weight gain discussed. Pregnancy weight gain of 11-20 lbs (BMI 30.0 or 34.9) encouraged.     -Preeclampsia risk factors:    High risk factors (1+): previous pregnancy with preeclampsia (especially when accompanied by an adverse outcome)    Moderate risk factors (2+): obesity (BMI 30+)    Based on her risk factors, Tami is at high risk of preeclampsia (1+ high risk factor or 2+ moderate risk factors).  Initiate low-dose aspirin (81 mg/day) prophylaxis between 12 weeks and 28 weeks of gestation (optimally before 16 weeks) and continue daily until delivery.  -Antepartum VTE risk factors absent.  -Reviewed importance of regular exercise in pregnancy and help with low back pain and other pregnancy symptoms.   -Did not dscuss importance of taking vitamins and supplements this visit. Address at next visit.   -Anticipatory guidance for common pregnancy questions and concerns reviewed.   -Danger s/sx for this trimester reviewed with patient.  -Reviewed genetic carrier screening. Patient declines: all.  -Reviewed genetic aneuploidy screening options. Patient  declines: first trimester screen.  -IOB packet given and  reviewed with patient, discussed standards of care, scope of practice, clinic and hospital settings, also reviewed clinic versus emergency phone number.   -CNM services and hospital options reviewed; emergency and scheduling phone numbers given to patient.  -She is washing her hands frequently, cleaning surfaces at home and limiting social contact to avoid exposure from coronavirus. She denies fever, cough, shortness of breath, any contact with anyone with coronavirus-like symptoms, travel to high risk areas. She is not able to work from home. Reviewed recommendation for daily iron supplementation for all pregnant women at this time, she has nausea that is making pill taking difficult; given list of options for supplementation  and comfort measures to prevent constipation. We discussed current recommendations for prenatal appointments with some inperson visits and telephone visits when appropriate. Reviewed changes to hospital policies regarding COVID-19 and the maternity unit. Reviewed how to reach CNM with non-urgent and urgent concerns between visits.   -Return to clinic 4    Total time spent with patient: 60 minutes, >50% time spent counseling and coordinating care.    Subjective:      Tami Malik is a 25 y.o.  here today for her First Obstetrical Exam.  She is a return [facility] patient. She has previously been seen this pregnancy for complaints of nausea in the first trimester. See ROS for more information on nausea.     Exercise: Tami was exercising 3 times per week running and weights, which has decreased with increases in environmental COVID in [place], as well as due to nausea. She now walks the stairs when on break at work to maintain cardiovascular health.   Safety (seatbelt, gun access, IPV, hx abuse): always, no firearms in the home. Denies IPV. Reports feeling safe in the home.    Tobacco/Alcohol/Drug Use: Denies any  EPDS today: 3   Sexual Partners: 1  Last Breast  Exam: unsure     lifetime x 20 months. 10-11 months after each pregnancy.  Feeding Plans: Breastfeeding.    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  Partners name: Donovan    OB History    Para Term  AB Living   3 2 2     2   SAB TAB Ectopic Multiple Live Births         0 2      # Outcome Date GA Lbr Stanislaw/2nd Weight Sex Delivery Anes PTL Lv   3 Current            2 Term 19 39w3d 03:55 / 00:05 6 lb 4 oz (2.835 kg) F Vag-Spont Inhalation, IV N IVAN      Complications: Fetal Intolerance, Other Excessive Bleeding, Preeclampsia   1 Term 17 38w2d 01:38 / 00:54 6 lb (2.722 kg) F Vag-Spont Inhalation, Local N IVAN      Birth Comments: IOL for preE      Complications: Other Excessive Bleeding, Preeclampsia       Expected Date of Delivery: Estimated Date of Delivery: 21     Past Medical History:   Diagnosis Date     Abnormal Pap smear of cervix     2018     Allergic     seasonal     Chlamydia     5 years ago     Hx of preeclampsia, prior pregnancy, currently pregnant, first trimester 2017    Without severe features 2019 recommended 81mg ASA daily beginning at week 13     Hypertension     preeclampsia first pregnancy     Hypertension 2017    preE     Postpartum hemorrhage      Urinary tract infection     x1     Varicella     as child     Past Surgical History:   Procedure Laterality Date     COLPOSCOPY      2018 neg     MOUTH SURGERY      had 4 molars removed     WISDOM TOOTH EXTRACTION      + 4 other molars     Social History     Tobacco Use     Smoking status: Never Smoker     Smokeless tobacco: Never Used   Substance Use Topics     Alcohol use: No     Drug use: No     Comment: Quit 3 years ago- MDMA, Marijuana     Current Outpatient Medications   Medication Sig Dispense Refill     acetaminophen (TYLENOL) 325 MG tablet Take 1-2 tablets (325-650 mg total) by mouth every 4 (four)  "hours as needed.  0     ondansetron (ZOFRAN) 4 MG tablet Take 1-2 tablets (4-8 mg total) by mouth every 8 (eight) hours as needed for nausea. 30 tablet 0     prenat.vits,nafisa,min-iron-folic (PRENATAL VITAMIN) Tab Take 1 tablet by mouth daily.       No current facility-administered medications for this visit.      No Known Allergies     Pregnancy Risk Factors: History of preeclampsia    Review of Systems  General:  Fatigue. States that she goes to sleep around 8 and wakes up at 6:30 with on awakening nightly. Believes she is getting adequate sleep, but continues to feel tired due to pregnancy.  Cardiovascular: Denies problem  Respiratory:  Denies problem  Gastrointestinal:  Nausea daily with emesis many days. States that she has been taking 4 mg prescribed ondansetron once daily at the most for nausea while at work, which has been effective. She is trying to avoid taking it at home. Utilizing strategies including small, frequent nutritious meals and lemon water to maintain nutrition and hydration status.   Genitourinary: Denies problem  Musculoskeletal:  Denies problem, including breast tenderness  Neurologic: Denies problem  Psychiatric: States that her mood has been okay. Reports transient anxiety that is manageable.    Objective:   Objective    Vitals:    11/20/20 1105   BP: 104/72   Pulse: 92   Weight: 189 lb 8 oz (86 kg)   Height: 5' 5.5\" (1.664 m)     Physical Exam:  General Appearance: Alert, cooperative, no distress, appears stated age  Head: Normocephalic, without obvious abnormality, atraumatic  Eyes: Conjunctiva/corneas clear, does not wear corrective lenses  Neck: Supple, symmetrical, trachea midline, no adenopathy  Thyroid: not enlarged, symmetric, no tenderness/mass/nodules  Back: Symmetric, no curvature, ROM normal  Lungs: Clear to auscultation bilaterally, respirations unlabored  Heart: Regular rate and rhythm, S1 and S2 normal, no murmur, rub, or gallop  Breasts:  No breast masses, tenderness, " asymmetry, or nipple discharge. Nipples are everted.   Abdomen: Soft, non-tender, no masses.    FHT: Not noted on exam. US today confirms SIUP is viable  Pelvic exam: Not indicated   Extremities: Extremities with normal ROM  Skin: Skin color, texture, turgor normal, no rashes or lesions  Lymph nodes: Cervical, supraclavicular, and axillary nodes normal  Neurologic: Alert and oriented x 3.    Lab:   Results for orders placed or performed in visit on 09/14/20   COVID-19 Virus PCR MRF    Specimen: Respiratory   Result Value Ref Range    COVID-19 VIRUS SPECIMEN SOURCE Nasopharyngeal     2019-nCOV Not Detected      Patient was seen with student, TANYA Mcdowell who was present for learning. I personally assessed, examined and made clinical decisions reflected in the documentation. MARNIE Kingsley,CNM

## 2021-06-13 NOTE — PROGRESS NOTES
Tami is feeling well. No complaints. Feels like constipation and hemorrhoids are better since increasing fiber and using stool softeners/Prep H. Legs/ankles are swelling less often, using compression stockings at times if planning a lot of activity.  Hgb and GBS swab done today. Discussed signs and symptoms of PreE, and when to notify midwife. Also reviewed signs of labor, and when to call. Active FM.  Tami reported weekly visits already scheduled, as she prefers later evening appointments.    Lashay Emerson RN, SNM    Patient was seen with student who was present for learning.  I personally assessed, examined and made clinical decisions reflected in the documentation.     Lottie Wade CNM

## 2021-06-13 NOTE — PATIENT INSTRUCTIONS - HE
[facility]- Contact information:  Appointment line and to get a hold of CNM in clinic Monday-Friday 8 am - 5 pm:  [phone].  There are some clinics with early start times (1st appointment 7:40 am) and others with evening hours (last appointment 6:20 pm).  Most are typically open from 8 am to 5 pm.    CNM on call answering service: [phone].  Specify your hospital of choice and leave a brief message for CNM;  will then page CNM who is on call at your specified hospital and you should receive a call back with 15 minutes.  Be sure that your ringer is audible and that you can accept blocked calls so that we can get back in touch with you! This number should be reserved for urgent needs if during the day, before 8 am, after 5 pm, weekends, holidays.    Contact the on-call CNM with warning signs, such as:    vaginal bleeding     Vaginal discharge and itching or pain and burning during urination    Leg/calf pain or swelling on one side    severe abdominal pain    nausea and vomiting more than 4-5 times a day, or if you are unable to keep anything down    fever more than 100.4 degrees F.         Touring the Maternity Care Center  At this time we are offering a virtual tour of the Maternity Care Centers at both [facility] and [facility]:   [facility]: [email or web address]://[email or web address]/[email or web address]/Maternity-Care-Center  [place]:   [email or web address]://[email or web address]/overarching-care/the-[email or web address]/tours  https://[email or web address]/[facility]/Maternity-Care-Center/#virtual_tour  When in person tours become available, registrations is required. To schedule a tour at either [place] or [facility], please do so online using the following links:  [facility] - [email or web address]&[email or web address]  [facility]  [email or web address]&[email or web address]&[email or web address]&[email or web address]&[email or web address]         You are invited to  Meet the [facility]    Virtual:   [email or web address]/classes-[email or web address]/meet-the-midwives-from-[facility]    A way to tour the hospital Labor and Delivery unit and meet the midwives in our group was postponed at the start of hospital restrictions following COVID-19. We will resume these when able.    Please call [phone] for ongoing updates.      Ultrasound Appointment:   Don't forget to schedule your ultrasound appointment around 20 weeks into your pregnancy. Your midwife will order the exam for you to schedule at [phone] with [facility] radiology locations or at the independent radiology clinic of your preference.      GENETIC SCREENING OPTIONS AT [facility]          All testing is optional. We don t recommend or discourage any test; it is totally up to you and your partner. Some couples wish to know their risk of having a baby with a genetic defect and others do not. We will support your decision. Abnormal results may lead to a discussion of options for further testing.    Accurate dating of your pregnancy is important for all testing so an ultrasound may be done prior to referral or testing.    No testing provides certainty; there are false positives and negatives associated with all testing, some more than others.    Most genetic testing is non-invasive (requires only a blood sample and sometimes an ultrasound or both).    It is always wise to check with your insurance carrier before proceeding.    Some testing can be done at our lab and some require a referral.    If you decide to do no testing, the 20 week ultrasound scan has some ability to detect abnormalities in the baby.    Altagracia or Verely    At 10 wk or greater, a blood sample can be drawn here at clinic or at any of our referral offices. It will provide  highly accurate results with low false positive rates for trisomy 18, trisomy 21 (Down Syndrome), and trisomy 13 (> 99% trisomy detection rate at a false positive rate of <0.1%). Gender identification can also be obtained if desired.    Quad Screen (4-marker screen)    Between 15 and 21 weeks, a sample of blood can be drawn at our lab to assess your risk of having a baby with Down Syndrome, Trisomy 18 and neural tube defects. Such testing is able to detect these conditions in 80-90% of cases and the false-positive rate is approximately 5%.     Why is dental care in pregnancy important?  During pregnancy, you are more likely to have problems with your teeth or gums. If you have an infection in your teeth or gums, the chance of your baby being premature (born early) or having low birth weight may be slightly higher than if your teeth and gums are healthy  Dental care is safe during pregnancy and important for the health of you and your baby.   What should you know before you see the dentist?    Make sure your dentist knows that you are pregnant.    If medications for infection or for pain are needed, your dentist can prescribe ones that are safe for you and your baby.    Tell your dentist about any changes you have noticed since you became pregnant and about any medications r or supplements you are taking.    Routine x-rays should be avoided in pregnancy, but it may be necessary if there is a problem or an emergency.     Your body should be covered with a lead apron to protect you and your baby.    Dental work can be done safely at any point in pregnancy. If possible, it is best to delay treatments and pro- cedures until after the first trimester.    For more information on dental health in pregnancy: http://onlinelibrary.lna.com/store/10.1111/jmwh.45862/asset/chwz15579.pdf?v=1&t=gygm6l26&q=[email or web address]     Quickening:   Your Baby in the Second Trimester of Pregnancy  ; At the start of  the second trimester, you will feel your baby's movements, which get stronger as the baby grows bigger. At the end of the fourth month, your baby weighs about five ounces. Her kidneys begin to produce urine. During visits to your health care provider, you will be able to hear your baby's heartbeat more clearly. Your baby can move and hear your voice.   ; By the end of the fifth month, you'll be able to feel light movements (called quickening) of your fetus. Your baby is sleeping and waking at regular intervals, and is more active than before. At this point, she is about nine inches long and weighs about one-half to one pound. During the sixth month, your baby's features become clearer. Eyebrows, eyelashes, and hair are developing. She also has finger and toe prints, and may be kicking strongly.  ; By the end of the second trimester, your baby weighs as much as two pounds and is about 11 inches long.         Gestational diabetes  Gestational diabetes develops during pregnancy (gestation). Like other types of diabetes, gestational diabetes affects how your cells use sugar (glucose). Gestational diabetes causes high blood sugar that can affect your pregnancy and your baby's health.  Any pregnancy complication is concerning, but there's good news. Expectant moms can help control gestational diabetes by eating healthy foods, exercising and, if necessary, taking medication. Controlling blood sugar can prevent a difficult birth and keep you and your baby healthy.  In gestational diabetes, blood sugar usually returns to normal soon after delivery. But if you've had gestational diabetes, you're at risk for type 2 diabetes. You'll continue working with your health care team to monitor and manage your blood sugar.    Who is at risk?  This is a list of factors that increase the risk of developing gestational diabetes for women during pregnancy:        Overweight prior to pregnancy (20% or more over ideal body weight)         High risk ethnic group: , , ,         Impaired glucose tolerance or traces of glucose in the urine        Family history of diabetes        Previously giving birth to a baby over 9 lbs. or stillborn        Previous pregnancy with gestational diabetes    Prevention:  There are no guarantees when it comes to preventing gestational diabetes -- but the more healthy habits you can adopt before pregnancy, the better. If you've had gestational diabetes, these healthy choices may also reduce your risk of having it in future pregnancies or developing type 2 diabetes down the road.    Eat healthy foods. Choose foods high in fiber and low in fat and calories. Focus on fruits, vegetables and whole grains. Strive for variety to help you achieve your goals without compromising taste or nutrition. Watch portion sizes.     Keep active. Exercising before and during pregnancy can help protect you from developing gestational diabetes. Aim for 30 minutes of moderate activity on most days of the week. Take a brisk daily walk. Ride your bike. Swim laps.  If you can't fit a single 30-minute workout into your day, several shorter sessions can do just as much good. Park in the distant lot when you run errands. Get off the bus one stop before you reach your destination. Every step you take increases your chances of staying healthy.    Lose excess pounds before pregnancy. Doctors don't recommend weight loss during pregnancy. But if you're planning to get pregnant, losing extra weight beforehand may help you have a healthier pregnancy.  Focus on permanent changes to your eating habits. Motivate yourself by remembering the long-term benefits of losing weight, such as a healthier heart, more energy and improved self-esteem.    Preventing Diabetes after Pregnancy:  It is estimated that 35-60 percent of women that have had gestational diabetes will develop type 2 diabetes in the future. It is also  thought that children from these pregnancies have a greater chance of developing obesity and type 2 diabetes.    If you do have prediabetes or have risk factors for having diabetes, research shows that doing just two things can help you prevent or delay type 2 diabetes: Lose 5% to 7% of your body weight, which would be 10 to 14 pounds for a 200-pound person; and get at least 150 minutes each week of physical activity, such as brisk walking.    RESOURCES   You can refer to the Starting Out Right book or find it online at http://www.health[employer].org/images/stories/maternity/[facility]-Starting-Out-Right.pdf p  You can sign up for a weekly parenting e-mail that gives support, tips and advice from health care professionals that starts with pregnancy and continues through the toddler years. To register, go to www.[employer].org/baby at any time during your pregnancy.    Breastfeeding Information:  OUTPATIENT LACTATION RESOURCES    -Schedule a clinic appointment with a [facility] with dedicated clinic hours for breastfeeding assistance by calling [phone]. Breastfeeding clinic visits are at [facility] on Wednesdays, [facility] on Tuesdays and [facility] on Thursdays.     -Baby Café    Pregnant and interested in breastfeeding?  Need answers to breastfeeding questions?  Want to help breastfeeding moms?  Already breastfeeding and want to meet other moms?    Join us at the Baby Café!    Baby Cafe is a free, drop-in service offering breast-feeding support for pregnant women, breast-feeding mothers and their families.  Come share tips and socialize with other mothers.  Babies and siblings are welcome (no childcare available).    Starting April 2018, Baby Café will be at 4 locations.  Please see below for the Baby Café closest to you!      [facility]  [address] [place] [address]  1st Wednesday: 10am-12pm    [place] [address]  [place] [address]  3rd Wednesday 4-6pm    [facility]  [address] [place] [address]  4th Wednesday 10am-12:30pm    [facility] [employer] [facility]  [address] [place] [address]  4th Wednesdays: 4-6pm      Hmong, Khmer, and Cook Islander which is may be available at some sites.    For more information, please contact: [email or web address]. or [phone]    -Attend a baby weigh in at [facility].  Lactation consultants are available to answer questions  [place]: Tuesdays 1:00 - 2:00  [facility], [facility]: Mondays 1:00 - 2:00   www.[employer]    -Attend one of the New Mama groups at [facility] in [facility].  [facility] also offers one-on-one in home and in office lactation consults.   www.Wildfire    -Attend a LeLeche League meeting.  Multiple groups in several locations throughout the [facility]. The meetings are no-cost and always informative breastfeeding education session through [employer] La Leche League  Www.lllofmndas.org/  Medication use while breastfeeding: http://toxnet.nlm.nih.gov/newtoxnet/lactmed.htm     Childbirth and Parenting Education:   [facility]: http://[facility] Medical CenterPoppin.FilmTrack/   ([phone] 788-BABY  [place]: (education, yoga & wellness) www.ZYB  [facility]: www.Wildfire   Childbirth collective: (Parent topic nights)  www.childbirthcollective.org/  Hypnobabies:  www.hypnobabiestwincities.com/  Hypnobirthing:  Http://hypnobirthing.com/  The Birth Hour: https://thebirthhour.FilmTrack/online-childbirth-class/    APPS and Podcasts:   Ovia  Glow Nurture  The Birth Hour (for birth stories)   Birthful   Expectful   The Longest Shortest Time  PregnancyPodcast Michelle Hyde    Book Recommendations:   Makayla [place]'s Birthing From [facility]--first few chapters include a new-age tone, you may prefer to skip it and keep going, because there is good stuff later.  This book recommendation  "covers emotional preparation, but does cover coping with pain, and use of both pharmacological and nonpharmacological methods.    Dr. Garcia' The Pregnancy Book and The Birth Book--the pregnancy book goes month-by month    Womanly Art of Breastfeeding by La Leche League International   Bestfeeding by Lalita Gibbs--great pictures    Mothering Your Nursing Toddler, by Vidhi Enriquez.   Addresses dealing with so many of the challenging behaviors of a nursing toddler.  How Weaning Happens, by La Leche League.  Discusses weaning at all ages, from medically necessary weaning of an infant, all the way up to age 5 (or older), with why/why not, and strategies.  Very empowering book both for deciding to wean and deciding not to.    American College of Nurse-Midwives (ACNM) http://www.midwife.org/; look at the informational handouts at http://www.midwife.org/Share-With-Women     www.mymidwife.org    Mother to Baby (Medication and Herbal guidance in pregnancy): http://www.mothertobaby.org  Toll-Free Hotline: 865.394.5299  LactMed (Medication use while breastfeeding): http://toxnet.nlm.nih.gov/newtoxnet/lactmed.htm    Women's Health.gov:  http://www.womenshealth.gov/a-z-topics/index.html    American pregnancy association - http://americanpregnancy.org    Centering Pregnancy (group prenatal care option): http://centeringhealthcare.org    Information about doulas:  Childbirth collective: http://www.childbirthcollective.org/  Doulas of [place] (CATHERINE):  www.catherine.org  [facility]  project: http://twincitiesdoulaproject.com/     Early Childhood and Family Education (ECFE):  ECFE offers parents hands-on learning experiences that will nourish a lifetime of teachable moments.  http://ecfe.info/ecfe-home/    March of Dimes www.[employer] - Nutrition  www.mypyramid.gov  Under \"For Consumers,\" click on \"pregnant and breastfeeding women.\"      Centers for Disease Control and Prevention (CDC) - Vaccines : " http://www.cdc.gov/vaccines/       When researching information on the web, question the validity of websites.  The domains .gov, .edu and.org tend to be more reliable information.  If there are a lot of advertisements, be cautious of the information provided. Stay away from blogs and chat rooms please!

## 2021-06-13 NOTE — PROGRESS NOTES
Tami is here with Donovan today.  Feeling well.  Having some acid reflux especially when baby is moving a lot.  She does take generic of Tums and has some relief.  Reviewed that she could take Zantac twice daily and that might work better and for longer.  She is also eating smaller meals more frequently and making better food choices that may cause less acid reflux.  They have not started her prenatal classes yet, but plan to do this soon.  She did sign her water birth consent today so that will be scanned into her chart.  She is feeling active fetal movement.  No signs or symptoms of  labor or preeclampsia.

## 2021-06-14 NOTE — PATIENT INSTRUCTIONS - HE
[facility] Contact information:  Appointment line and to get a hold of CNM in clinic Monday-Friday 8 am - 5 pm:  [phone].  There are some clinics with early start times (1st appointment 7:40 am) and others with evening hours (last appointment 6:20 pm).  Most are typically open from 8 am to 5 pm.    CNM on call answering service: [phone].  Specify your hospital of choice and leave a brief message for CNM;  will then page CNM who is on call at your specified hospital and you should receive a call back with 15 minutes.  Be sure that your ringer is audible and that you can accept blocked calls so that we can get back in touch with you! This number should be reserved for urgent needs if during the day, before 8 am, after 5 pm, weekends, holidays.    Contact the on-call CNM with warning signs, such as:    vaginal bleeding     Vaginal discharge and itching or pain and burning during urination    Leg/calf pain or swelling on one side    severe abdominal pain    nausea and vomiting more than 4-5 times a day, or if you are unable to keep anything down    fever more than 100.4 degrees F.          You are invited to  Meet the [facility]    Virtual:   [email or web address]/classes-and-[email or web address]/meet-the-midwives-from-[facility]    A way to tour the hospital Labor and Delivery unit and meet the midwives in our group was postponed at the start of hospital restrictions following COVID-19. We will resume these when able.    Please call [phone] for ongoing updates.      UNDERSTANDING  LABOR    Going into labor before your 37th week of pregnancy is called  labor.  labor can cause your baby to be born too soon. This can lead to a number of health problems that may affect your baby. From 28-35 weeks, Patients are advised to be evaluated at [facility] since they have a  Intensive  Care Unit (NICU).  -Before labor, the cervix is thick and closed.  -In  labor, the cervix begins to efface (thin) and dilate (open) over a short period of time    Symptoms of  Labor  If you believe you re having  labor, contact the midwives right away. But contractions alone don t mean you re in  labor. What matters more are changes in your cervix (the lower end of the uterus). Symptoms of  labor include:    five or more contractions per hour    Strong & frequent contractions    Constant menstrual-like cramping    Low-back pain    Mucous or bloody vaginal discharge    Bleeding or spotting in the second or third trimester    Evaluating  Labor  Your midwife will try to find out whether you re in  labor or whether you re just having contractions.She may watch you for a few hours. The following tests may be done:    Pelvic exam to see if your cervix has effaced (thinned) and dilated (opened)    Uterine activity monitoring to detect contractions    Fetal monitoring to check the health of your baby    Ultrasound to check your baby s size and position    Caring for Yourself At Home  If you have contractions  but your cervix is still thick and closed, the midwife may ask you to do the following at home:    Drink plenty of water.    Do fewer activities.    Rest in bed on your side.    Avoid intercourse and nipple stimulation.    When to Call Your Midwife    Five or more contractions per hour    Bag of water breaks    Bleeding or spotting     If You Need Hospital Care   labor often requires that you have hospital care and complete bed rest. You may have an IV (intravenous) line to get fluids. And you may be given pills or an injection to help prevent contractions. Finally, you may receive medication (corticosteroids) that helps your baby s lungs mature more quickly.    Are You At Risk?  Any pregnant woman can have  labor. It may start for no reason. But  these risk factors can increase your chances:    Past  labor or past early birth    Smoking and drug or alcohol use during pregnancy    Multiple fetuses (twins or more)    Problems with the shape of the uterus    Bleeding during the pregnancy    The Dangers of  Birth  A baby born too soon may have health problems. This is because the baby didn t have enough time to mature. The baby then is at risk of:    Not breastfeeding well    Having immature lungs    Bleeding in the brain    Dying    Reaching Term  Your goal is to get as close to term as you can before giving birth. The closer you get to term, the higher your chance of having a healthy baby. Work with your healthcare provider. Together, you can take steps that may keep you from giving birth too early.        Testing for Gestational Diabetes in Pregnancy  [facility] are committed to providing safe care during your pregnancy. We follow the recommendations of the American Diabetes Association and the American College of Obstetricians and Gynecologists to test all pregnant women for gestational diabetes. Testing early in pregnancy (if you have risk factors) and testing all women between 26-28 weeks follows local and national guidelines for care during pregnancy. Clients who feel that they cannot consent to such testing, may choose to transfer their care to our consultant obstetricians.  What is the test?  Eat normally on the day of the test; a diet rich in protein, whole grains, and vegetables would be best. Avoid simple sugars, white flour products and juices prior to testing.  You will be asked to drink a 10 oz glucose beverage (50 gm, about the same as a can of root beer).  The product is not carbonated as it has to be consumed within 5 minutes. During the next hour, you are seen for a prenatal visit, but are asked to limit your activity around the clinic. Feel free to bring a book or your computer.   Any level less than 140 for this   glucose challenge test  is considered normal. If measured at 140 to 185, the diagnostic test, a  3 hour glucose tolerance test  will be conducted. If 2 or more readings are abnormal, the diagnosis of gestational diabetes is confirmed and a referral to a diabetes educator will be made. If the level is 185 or above, this confirms the diagnosis and a referral will be made without administering the 3 hour test.  A fasting blood sugar may be performed sometime in the first trimester if you have risk factors for the development of gestational diabetes such as a previous diagnosis or birth of a large baby or a close family relative with diabetes.  What is gestational diabetes?  Your body converts what you eat into glucose. In response to rising blood sugar levels it secretes insulin in order to be able to utilize that glucose. During pregnancy as the placenta grows and matures, it secretes hormones that are necessary to assure baby s growth and development, however, they also reduce the action of insulin in the mother. In some cases too much insulin is blocked (this is called  insulin resistance ) and blood sugar in the mother rises above a normal level. Pregnant women who have never had diabetes before but who have high blood sugar (glucose) levels during pregnancy are said to have gestational diabetes. Gestational diabetes affects about 4-7% of all pregnancies.  What if I have gestational diabetes?  If either of the tests for gestational diabetes show that you have this condition, a referral will be made to visit with the diabetes educator. The educator will help you to make wise food choices, count carbohydrates, monitor your blood sugar and record your levels in a journal. We ask that you bring this diary with you at each prenatal visit. You may meet with the educator several times during the remainder of your pregnancy.  How gestational diabetes can affect your baby  Some mothers may wonder why testing for GDM is  delayed until the early part of the 3rd trimester for most women. Gestational diabetes has an impact on the baby at the time of rapid body growth. When the mother s blood has elevated sugar levels, extra glucose crosses the placenta causing the baby to have excess weight gain ( macrosomia ). Some babies are too big to be born vaginally so their mother must have  births. Babies of mothers with uncontrolled gestational diabetes may have difficult births that can cause trauma to the mother and in rare circumstances, babies may suffer fractures or oxygen deprivation during birth. They may have difficulty maintaining their own blood sugar after birth and they may also have trouble adapting to life outside. Recent research indicates that babies of mothers with uncontrolled or undiagnosed gestational diabetes are at risk for obesity and type 2 diabetes. Women who develop gestational diabetes are more likely to develop type 2 diabetes within 15 years after their pregnancy.  Additional Information  The American College of Nurse Midwives (ACNM) provides an information sheet describing diabetes screening in pregnancy: http://www.womensdocs.com/ramez/pdf/Second_Trimester/Gestational_Diabetes.pdf    You can visit the American Diabetes Association website http://www.diabetes.org for additional information and to purchase their book,  Gestational Diabetes: What to Expect .    A brochure from the American College of Obstetrics and Gynecology is available at:   http://www.acog.org/~/media/For%20Patients/qmd348.pdf?dmc=1&sc=[email or web address]  Testing for gestational diabetes is a critical part of your prenatal journey. Thank you for taking good care of yourself and your baby.    HEALTHY PREGNANCY CARE: 22-26 WEEKS PREGNANT    You are finishing your second trimester. Your baby is developing rapidly. At this stage, babies have a sense of balance, can respond to touch, and are recognizing parent voices.  Your baby will be moving  around more now.  You may notice Trey-Parra contractions now, which are painless and prepare the uterus for the delivery.    Nutrition: During this time, you may find that your nausea and fatigue are gone. Overall, you may feel better and have more energy than you did in your first trimester. Be sure you are getting enough calcium and iron in your diet. Your prenatal vitamins cannot supply all of the nutrients you need, so continue to eat 3-4 servings of dairy foods and 2-3 servings of meat/fish/poultry/nuts every day. Foods high in iron include: red meats, eggs, dark green vegetables, dark yellow vegetables, nuts, kidney beans and chickpeas. Some cereals are fortified with iron, so look at the food labels for 100% of the daily requirement for iron.     Discuss your work situation with your midwife or physician as needed. If you stand for long periods of time, you may need to make changes and take breaks.    [place] for childbirth and parenting classes, including an infant CPR class. Breastfeeding classes are recommended too.    Childbirth and Parenting Education:   [facility]: http://"[employer]."[facility] Permanente Medical CenterClickScanShare/   (987) 284-BABY  [place]: (education, yoga & wellness) www.EyeLock  Enlightened [employer] Resource Groupa: www.GameOn   Childbirth collective: (Parent topic nights)  www.childbirthcollective.org/  Hypnobabies:  www.hypnobabiestwincities.com/  Hypnobirthing:  Http://hypnobirthing.com/  The Birth Hour: https://theQuick TV.Tansna [employer]/online-childbirth-class/    APPS and Podcasts:   Ovia  Glow Nurture  The Birth Hour (for birth stories)   Birthful   Expectful   The Longest Shortest Time  PregnancyPodcast Michelle Hyde    Book Recommendations:   Makayla Xavi's Birthing From Within--first few chapters include a new-age tone, you may prefer to skip it and keep going, because there is good stuff later.  This book recommendation covers emotional preparation, but does cover coping with pain, and use of both  "pharmacological and nonpharmacological methods.    Dr. Garcia' The Pregnancy Book and The Birth Book--the pregnancy book goes month-by month    Womanly Art of Breastfeeding by La Leche League International   Bestfeeding by Lalita Gibbs--great pictures    Mothering Your Nursing Toddler, by Vidhi Enriquez.   Addresses dealing with so many of the challenging behaviors of a nursing toddler.  How Weaning Happens, by La Leche League.  Discusses weaning at all ages, from medically necessary weaning of an infant, all the way up to age 5 (or older), with why/why not, and strategies.  Very empowering book both for deciding to wean and deciding not to.    American College of Nurse-Midwives (ACNM) http://www.midwife.org/; look at the informational handouts at http://www.midwife.org/Share-With-Women     www.mymidwife.org    Mother to Baby (Medication and Herbal guidance in pregnancy): http://www.mothertobaby.org  Toll-Free Hotline: 635.317.4173  LactMed (Medication use while breastfeeding): http://toxnet.nlm.nih.gov/newtoxnet/lactmed.htm    Women's Health.gov:  http://www.womenshealth.gov/a-z-topics/index.html    American pregnancy association - http://americanpregnancy.org    Centering Pregnancy (group prenatal care option): http://centeringhealthcare.org    Information about doulas:  Childbirth collective: http://www.childbirthcollective.org/  Doulas of [place] (CATHERINE):  www.catherine.org  [place] [facility]  project: http://twincitiesdoulaproject.com/     Early Childhood and Family Education (ECFE):  ECFE offers parents hands-on learning experiences that will nourish a lifetime of teachable moments.  http://ecfe.info/ecfe-home/    March of Dimes www.Eventful.EagerPanda - Nutrition  www.mypyramid.gov  Under \"For Consumers,\" click on \"pregnant and breastfeeding women.\"      Centers for Disease Control and Prevention (CDC) - Vaccines : http://www.cdc.gov/vaccines/       When researching information on the web, question " the validity of websites.  The RPost .gov, .edu and.org tend to be more reliable information.  If there are a lot of advertisements, be cautious of the information provided. Stay away from blogs and chat rooms please!    How can you care for yourself at home?   You can refer to the Starting Out Right book or find it online at http://www.healtheast.org/images/stories/maternity/[facility]-Starting-Out-Right.pdf or http://www.healtheast.org/images/stories/flipbooks/[facility]-starting-out-right/[facility]-starting-out-right.html#p=8     You can sign up for a weekly parenting e-mail that gives support, tips and advice from health care professionals that starts with pregnancy and continues through the toddler years. To register, go to www.healthApplied Computational Technologies.org/baby at any time during your pregnancy.      Baby Feeding in the Hospital: Information, Support and Resources    As you prepare for the birth of your child, you will want to consider options for feeding your baby including breast-feeding and/or baby formula. The American Academy of Pediatrics recommends exclusive breast-feeding for the first six months (although any amount of breast-feeding is beneficial).  However, we also understand that breast-feeding is a personal choice and not for everyone. Whether or not you choose to breast-feed, your decision will be respected by our staff.    There are numerous benefits of breast-feeding; here are a few to consider:    Provides antibodies to protect your baby from infections and diseases    The cost: formula can cost over $1,500 per year    Convenience, no warming up or sterilizing bottles and supplies    The physical contact with breastfeeding can make babies feel secure, warm and comforted    What ever my feeding choice, what can I expect after I deliver my baby?    Your baby will usually be placed skin-to-skin immediately following birth. The skin to skin contact between you and your baby will be a special and memorable  time. The bonding and attachment comforts your baby and has a positive effect on baby s brain development.     Having your baby  room in  with you also helps you start to learn your baby s body rhythms and sleep cycle.      You will also begin to learn your baby s cues (signals) that he or she is ready to feed.    When do I start to feed my baby?  As soon as possible after your baby s birth, you will be encouraged to begin feeding.  In the first couple of weeks, your baby will eat often.  Breastfeeding babies usually eat at least 8 times in 24 hours.  Babies fed formula usually eat at least 7 times in 24 hours.      Breast-feeding tips:    Get comfortable and use pillows for support.    Have your baby at the level of your breast, facing you,  tummy to tummy .      Touch your nipple to your baby s lips so you baby s mouth opens wide (rooting reflex).  Aim the nipple toward the roof of your baby s mouth. When your baby opens his or her mouth, pull your baby toward your breast to help your baby  latch on  to your nipple and much of the areola area.    Hand expressing your breast milk can assist with latching your baby to your breast, if needed.    Ask for help, breastfeeding may seem awkward or uncomfortable at first, this is normal. There are numerous resources available at [facility], Clinics and beyond.     If your goal is to exclusively breastfeed, avoid using any formula or artificial nipples (including bottles and pacifiers) while you are your baby are learning to breastfeed unless there is a medical reason.       Mixing breastfeeding and formula can interfere with how you begin building your milk supply.  It can impact how you and your baby  learn  to breastfeeding together and alter the natural growth of  good  bacteria in your baby s stomach.    Delay a pacifier or a bottle in the first few weeks until breastfeeding is well established. This is often around 3 weeks of age.    Ask your nurse to show  you how to hand express.   Breast milk can be kept in the refrigerator or freezer for later use.        Touring the Maternity Care Center  At this time we are offering a virtual tour of the Maternity Care Centers at both [facility] and [facility]:   [facility]: https://www.AUTOFACT.org/Locations/[facility]/Maternity-Care-Center  [place]:   https://HiLine [employer] Company/overarching-care/the-birthplace/tours  https://www.AUTOFACT.org/Locations/[facility]/MaternityCare-Center/#virtual_tour  When in person tours become available, registrations is required. To schedule a tour at either [place] or [facility], please do so online using the following links:  [facility] - https://www.Patient Communicator/registerlist.asp?s=6&m=303&vs=5&p=2&rfskk=384&ps=1&group=37&it=1&mof=187  [facility] - https://www.Patient Communicator/registerlist.asp?s=6&m=303&vs=5&p=2&kjzsy=831&ps=1&group=38&it=1&emz=152    Hospital and Clinic  Resources:  -Schedule an appointment with a [facility] [place] [facility]   CNM who is also a Lactation Consultant by calling [phone]     -Schedule a clinic appointment with a [facility] [place] [facility] CNM with dedicated clinic hours for breastfeeding assistance by calling [phone]. Breastfeeding clinic visits are at [facility] on Mondays, [facility] on Tuesdays and [facility] on Thursdays.     Baby Café    Pregnant and interested in breastfeeding?  Need answers to breastfeeding questions?  Want to help breastfeeding moms?  Already breastfeeding and want to meet other moms?    Join us at the Baby Café!    Baby Cafe is a free, drop-in service offering breast-feeding support for pregnant women, breast-feeding mothers and their families.  Come share tips and socialize with other mothers.  Babies and siblings are welcome (no childcare available).    Starting April 2018, Baby Café will be at 4 locations.  Please  see below for the Baby Café closest to you!      [facility]  [address] [place] [address]  1st Wednesday: 10am-12pm    [facility]  [address] [place] [address]  3rd Wednesday 4-6pm    [facility]  [address] [place] [address]  4th Wednesday 10am-12:30pm    [facility]  [address] [place] [address]  4th Wednesdays: 4-6pm      Hmong, Papua [place] Guinean, and Faroese which is may be available at some sites.    For more information, please contact: [email or web address]. or [phone]    -Attend a baby weigh in at [facility].  Lactation consultants are available to answer questions  [place]: Tuesdays 1:00 - 2:00  [facility]: Mondays 1:00 - 2:00  www.[facility]    -Attend one of the New Mama groups at [facility] in [facility].  [facility] also offers one-on-one in home and in office lactation consults.   www.[facility].Hammerless    -Attend a Brandon Ramosague meeting.  Multiple groups in several locations throughout the [facility]. The meetings are no-cost and always informative breastfeeding education session through Internatal La Leche League  Www.madina.org/  Medication use while breastfeeding: http://toxnet.nlm.nih.gov/newtoxnet/lactmed.htm     Online Resources:    [facility]altheast.org/baby sign up for free online weekly e-mail    [facility]altheast.org/maternity    Breastfeedingmadesimple.com    li.org (La Leche League)    Normalfed.com    Womenshealth.gov/breastfeeding    Workandpump.com    Breast-feeding Supplies & Pumps:  Talk to your insurance provider or WIC (Women, Infants and Children) to learn more about options available to you. Recent health insurance changes may include additional coverage for supplies and pumps.    Public Health:  Women, Infants and Children Nutrition program (WIC): provides breast-feeding support and education in addition to formal feeding moms.  "837-RKO-1153 or http://www.health.[facility].us/divs/fh/wic    Family Health Home Visiting: Public Health Nurse home visits are available. Talk to your provider to see if you qualify. Most [facility] have a program available.    Additional Resources:  La Leche [place] is an international, nonprofit, nonsectarian organization offering information, education, and support to mothers who want to breast-feed their babies. Local groups offer phone help and monthly meetings. Visit Channelinsight.Stemline Therapeutics or DuckHook Media.org and us the  Find local support  drop down menu or click on the  Resources  tab.    [place] Breastfeeding Resources: 5-342-279-BABY (9756) toll free    National Breastfeeding Help Line trained breastfeeding peer counselors can help answer common breast-feeding questions by phone. Monday-Friday: English/Chadian  5-296- 994-9662 toll free, 1-383.682.4709 (TTY)    [facility] Connection: 651-326-CARE (0570)      Virtual Breastfeeding Support:    During this time of isolation, breastfeeding families need even more community!  Here are some area organizations offering virtual support groups for breastfeeding:      [facility] Support Group, Tuesdays at 10:30 am   Run by DOYLE Pena of The Baby Whisperer Lactation Consultants   Go to [facility]ispe[facility]act[facility] Facebook page and click on \"events\" for link   https://www.AllergEase.com/events/[email or web address]/    [facility] Milk Hour, Thursdays at 2:30 pm    Run by DOYLE Jacobson   Go to [facility] FB page and send message to get link   https://www.AllergEase.com/healthfoundations/    [facility] holding virtual meetings the first Tuesday of each month, 8-9 pm, and the   Third Saturday, 10 - 11 am.  Go to La Leche [facility] [place] FB page; message to get link [email or web address]://www.AllergEase.com/JERALDLofGDeepali/?hc_location=[facility]    [place] offers a " Lactation Lounge every Friday 12pm - 1pm, run by Eugenio Aden Leader   Sign up via link at [email or web address]   [email or web address]    [facility] is offering virtual support groups every Monday, 10:30 am - 12 pm, run by nurse DOYLE   [email or web address]    Prenatal Breastfeeding Classes:        [employer] is offering virtual breastfeeding and  care classes:  [email or web address]    [employer] childbirth and breastfeeding education offering virtual prenatal breastfeeding classes  [email or web address]

## 2021-06-14 NOTE — PROGRESS NOTES
[facility] OB Triage    Subjective: Tami Malik is a  22 y.o. female at 38w0d with a current prenatal history significant for headache and blurry vision 2 days ago who presents to OB triage with concerns for elevated blood pressures noted in clinic.  The patient had a bitemporal headache and 30-45 minutes of peripheral vision loss on Monday.  She called the nurse midwife on call and was able to check her own blood pressure at work; it was noted to be elevated to 144/95.  She was seen in clinic later that day and labs were drawn for HELLP/pre-eclampsia rule out.  All labs were normal at that time, except for the protein/creatinine ratio, which was elevated to 0.34.  Because of the elevated protein/creatinine ratio, the patient was sent home with a 24-hour urine and her protein level came back at 219.  She was seen in clinic at the end of the day today and her blood pressure was mildly elevated at 134/91, so she was sent up to OB triage for serial blood pressure monitoring and redraw of a HELLP panel.    The patient reports feeling well at the moment.  She denies any headaches, blurry vision, shortness of breath, chest pain, or right upper quadrant abdominal pain.  She has had bilateral lower extremity edema.  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  She reports that she is feeling good fetal movement at this time.  She denies any loss of fluid or vaginal bleeding.  This is her first pregnancy and she denies any significant medical history outside of pregnancy.  She takes a prenatal vitamin as her only daily medication.     Estimated Date of Delivery: 17 Patient's last menstrual period was 2017 (exact date).     OB provider: No Primary Care Provider     OB History      Para Term  AB Living    1         SAB TAB Ectopic Multiple Live Births                Objective:  Blood pressure (!) 138/91, pulse 82,  temperature 98.7  F (37.1  C), temperature source Oral, resp. rate 16, last menstrual period 02/22/2017, not currently breastfeeding.     General:   alert, appears stated age and cooperative.   Skin:   No rash or skin abnormality noted on the anterior thorax or abdomen.   HEENT:  extra ocular movement intact and sclera clear, anicteric   Lungs:   Lung sounds are clear vesicular bilaterally in the anterior thorax.   Heart:   Heart is regular rate and rhythm, no clicks, murmurs, or rubs are noted.   Extremities: Warm, dry and well perfused. 1+ edema.   Neuro:  Patellar, Achilles, and brachioradialis reflexes 2+ and symmetric bilaterally.    Abdomen:  Non--tender, abdomen consistent in size with a 38 week intrauterine pregnancy.  Leopold maneuvers are equivocal.   Membranes intact   [place]:   Infrequent contractions with uterine irritability.   FHT: Baseline: 125 bpm, Variability: Moderate (6 - 25 bpm), Accelerations: Present and Decelerations: Absent   Presentation: unsure   Cervix: Examined by myself.    Dilation: Closed   Effacement: 25%   Station:  -2   Consistency: medium   Position: middle     Vertex confirmed during BPP today which was 8/8.    Laboratory Studies:   Results for orders placed or performed during the hospital encounter of 11/15/17   Creatinine   Result Value Ref [place]    Creatinine 0.60 0.60 - 1.10 mg/dL    GFR MDRD Af Amer >60 >60 mL/min/1.73m2    GFR MDRD Non Af Amer >60 >60 mL/min/1.73m2   HM2(CBC w/o Differential)   Result Value Ref [place]    WBC 13.9 (H) 4.0 - 11.0 thou/uL    RBC 3.93 3.80 - 5.40 mill/uL    Hemoglobin 12.8 12.0 - 16.0 g/dL    Hematocrit 37.2 35.0 - 47.0 %    MCV 95 80 - 100 fL    MCH 32.6 27.0 - 34.0 pg    MCHC 34.4 32.0 - 36.0 g/dL    RDW 13.7 11.0 - 14.5 %    Platelets 188 140 - 440 thou/uL    MPV 12.0 8.5 - 12.5 fL   AST (SGOT)   Result Value Ref [place]    AST 17 0 - 40 U/L   ALT (SGPT)   Result Value Ref Range    ALT 16 0 - 45 U/L   Uric Acid   Result Value Ref [place]    Uric  Acid 5.9 2.0 - 7.5 mg/dL   INR   Result Value Ref [place]    INR 0.95 0.90 - 1.10   APTT(PTT)   Result Value Ref [place]    PTT 26 24 - 37 seconds   Type and Screen   Result Value Ref [place]    ABORh A POS     Antibody Screen Negative Negative      ASSESSMENT AND PLAN: Tami Malik is a  22 y.o. female who presented to [facility] at 38w0d on 11/15/2017 with elevated blood pressures.   1. Not in labor.  2. Initial blood pressures were within normal limits.  Repeat laboratory markers, as indicated above, were within normal limits.  We did not repeat of protein/creatinine ratio, given that we have a 24-hour urine sample results from today.  No symptoms suggestive of preeclampsia at this time.  However, patient did have, subsequently, a few elevated blood pressures to 149/88.  By definition, with 2 blood pressures greater than 140/90 at least 4 hours apart, the patient does meet criteria for the diagnosis of gestational hypertension.  I did discuss the patient's case with Dr. Quiñonez and QUAN Wolfe.  Given gestational age of 38 weeks, Briones score of 2, lack of other concerning symptoms, and subsequent normal blood pressures after her elevated pressures, would be reasonable to either begin induction for gestational hypertension at this time or allow the patient to return home with very close follow-up in clinic on Friday.  In discussions with Aspen Wolfe and the patient and her , it was decided that the patient could return home tonight with some Vistaril to help with sleep.  She is to remain out of work until delivery.  She is to follow-up with QUAN on Friday in clinic for a blood pressure recheck and possibly to redraw labs.  We discussed reasons for her to return or call sooner than that, which include return of headache, change in vision, chest pain, shortness of breath, right upper quadrant abdominal pain, or symptoms of labor such as consistent, painful contractions or  rupture of her bag of water.  Patient and her  voiced understanding of the plan and feel comfortable discharging home at this time.  3. Patient to also check her blood pressure at home and call with abnormal values and plan to call and check in with CNM on-call on the afternoon of 11/16.  Patient is in agreement with this plan.     Patient discussed with Certified Nurse Midwife, Aspen Wolfe, who was present and agrees with exam and plan.      Lele Manning PGY1 11/15/2017  [facility] Medicine  Pager:  [phone]

## 2021-06-14 NOTE — PROGRESS NOTES
Tami is here with Donovan for 37 week COREY visit. Reports feeling well. Mentioned that she felt some discomfort on her left side, near her rib, intermittently. Recommedned warm baths, and to call if pain intensity increases, or if it changes/spreads, or is persistent. Discussed s/s of PreE, and reviewed labor s/s and when to call. Still planning waterbirth, and wishes to use nitrous as a back up for pain management. Baby active, and denies LOF and bleeding. Did have some stronger zhane niño yesterday, but nothing today. Breast pump given today. RTC 1 week.    Felicitas Emerson, RN, SNM    Patient was seen with student who was present for learning.  I personally assessed, examined and made clinical decisions reflected in the documentation.     Lottie Wade CNM

## 2021-06-14 NOTE — PROGRESS NOTES
A: Preeclampsia without severe features, resolved  Normal blood pressure  Lower extremity edema    P: Continue to have good intake of p.o. fluids, elevate feet when laying or sitting, encourage ambulation as tolerated to help return of fluid to the heart and kidneys for excretion  Return to clinic in 5 weeks for postpartum exam  Call for severe headache, visual changes, right upper gastric pain, or any other concerns until seen next    S:  Tami is here with her  Donovan and daughter today for blood pressure check.  She denies any headache, visual changes, or right upper quadrant pain.  She does note increased swelling in her lower extremities.  We did discuss that she did receive IV fluids during her hospitalization, and that it takes time for her body to rid itself of the extra fluid.  Also reviewed that although there are immediate alterations in hormones that change during pregnancy, some will take time to completely resolve.  She should expect that it may take 1-2 weeks for swelling to resolve totally.  She is breast-feeding and this is going well for her.  They had the baby at the pediatrician this morning and she has gained 3 ounces since Monday.  Today she weighed 5 lbs. 11 oz.  Tami is able to sleep between feedings at night, and does set an alarm clock to wake her baby every 3 hours to feed.  She reports that her bleeding is light and without clots.  She also states that she can feel the stitches on her bottom with a slight pulling sensation, but she does not report any pain with them.  She continues to soak in the tub for comfort.  Rosalee will have the rest of the week off and they are planning to go to the show that he has been working on on Saturday.  Once he goes back to work, Myrna will have his family available to help if needed.  She reports that she is eating well, taking her prenatal vitamin, and drinking a good amount of fluid.    O: Physical Examination: General appearance - alert, well  appearing, and in no distress  Mental status - alert, oriented to person, place, and time  Extremities -bilateral lower extremity edema left greater than right that extends to lower thigh.  No alterations in sensation or circulation noted  Skin -skin appears pale but pink, warm, with normal moisture

## 2021-06-14 NOTE — PROGRESS NOTES
"Tami presents today due to concerns about symptoms and BP today.  She was driving to work today and experienced a \"mild headache and couldn't see in my peripheral vision\".  States this lasted for 30-45 minutes, and resolved when she got to work. BP at work was 134/90, 135/91 and 144/95.  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  Denies any further symptoms throughout the day.  Denies epigastric pain. Denies any change in swelling. Has been wearing compression stockings for edema in lower legs. Exam today reveals DTR 2+ with moderate edema, non pitting to mid calf.  No facial or hand swelling, but has been unable to wear her original wedding ring for \"quite some time\".  Reports normal fetal movement and is having occasional UC's.  She denies nausea and vomiting.    Has regular OB visit on Wednesday.  HELLP labs drawn today with Pr/Cr ratio.  Will f/u with labs and if abnormal will have pt present to hospital for IOL.  If normal, will return for OBV on Wednesday for recheck of BP and discussion of plan of care, if BP remains elevated, discussed recommendation of IOL.  Long discussion with patient regarding gestational HTN, PEC: s/sx as well as risks associated with hypertensive disorders of pregnancy. Reviewed plan of care for IOL with HTN. Discussed when to call CNM on call with danger s/sx.  Reviewed importance of rest and left sidelying with blood pressure elevations.  On call CNM's notified of impending lab work, will watch for results.  "

## 2021-06-14 NOTE — PROGRESS NOTES
2355 Reviewed discharge instructions with patient and . Both verbalize understanding of pre eclamptic symptoms and when to call provider.  Patient will  Hydroxyzine rx tonight at [facility] before returning to home.  Questions answered and patient dc'd home ambulatory.

## 2021-06-14 NOTE — PROGRESS NOTES
"Tami is here for a COREY and BP follow up at 38 weeks. She was seen this week at [facility] for evaluation of HA, and visual changes.  BP was WNL however P/C ratio was elevated at .34.  Discussed with Dr. Duarte yesterday and agreed with 24 hour urine collection. This is back today at 219.  Denies headache, visual disturbances, and epigastric pain. Reflexes +2 bilaterally, no clonus. Tami reported \"resting all day today, and her swelling improved\". Tami stated that she dropped her 24 hour urine off at the lab on her way to this visit. Baby active, denies cramping/contractions, LOF/bleeding. Tami's /90. Recommended observation at [facility] for repeat BPs, and additional preeclampsia labs, and possible IOL if medically indicated. Tami agrees to POC, and is going directly to unit. She has contacted her partner Donovan, who will meet her there. Briefly discussed IOL methods and options, in the event it was recommended.  Active FM.  Charge RN and Aspen Wolfe CNM notified of pt status and need for evaluation on [facility].    Lashay Emerson RN, SNM    Patient was seen with student who was present for learning.  I personally assessed, examined and made clinical decisions reflected in the documentation.     Lottie Wade CNM        "

## 2021-06-14 NOTE — PATIENT INSTRUCTIONS - HE
"  Dear Tami Malik,    Based on your exposure to COVID-19 (coronavirus), we would like to test you for this virus. I have placed an order for this test. The best time for testing is 5-7 days after the exposure. I am not concerned for the exposure that happened when you saw your coworker a week prior to his symptoms starting. It's unlikely that he had any viral shedding at that time. If the exposure that happened the day prior to his test was >15 min it is considered a true high risk exposure and you should follow quarantine guidelines written below.     How to schedule:  For all employees or close contacts (except [employer] and [employer] - see below), go to your [employer] home page and scroll down to the section that says  You have an appointment that needs to be scheduled  and click the large green button that says  Schedule Now  and follow the steps to find the next available opening.     If you are unable to complete these steps or if you cannot find any available times, please call [phone] to schedule employee testing.     [employer] employees or close contacts, please call [phone].   [employer] employees or close contacts call [phone].    Return to work/school/ guidance:   For people with high risk exposures outside the home    Please let your workplace manager and staffing office know when your quarantine ends.     We can not give you an exact date as it depends on the information below. You can calculate this on your own or work with your manager/staffing office to calculate this. (For example if you were exposed on 10/4, you would have to quarantine for 14 full days. That would be through 10/18. You could return on 10/19.)    Quarantine Guidelines:  Patients (\"contacts\") who have been in close prolonged contact of an infected person(s) (within six feet for at least 15 minutes within a 24 hour period), and remain asymptomatic should enter quarantine based on the following " options:    14-day quarantine period (this remains the CDC recommendation for the greatest protection against spread of COVID-19) OR    Minimum 7-day quarantine with negative RT-PCR test collected on day 5 or later OR    10-day quarantine with no test  Quarantine Guideline exceptions are as follows:  ? People whose high-risk exposure was a household member should always quarantine for 14 days from their last date of exposure  ? Residents of congregate care and congregate living settings should always quarantine for 14 days from their last date of exposure  ? Individuals who work in health care, congregate care, or congregate living should be off work for 14 days from their last date of exposure. Community activities for this group can be resumed based on options above.  ? For people with high risk exposure to an individual they live with it is still recommended to quarantine for 14 days the last date of exposure even if the covid test is negative.     Note: If you have ongoing exposure to the covid positive person, this quarantine period may be more than 14 days. (For example, if you are continued to be exposed to your child who tested positive and cannot isolate from them, then the quarantine of 7-14 days can't start until your child is no longer contagious. This is typically 10 days from onset of the child's symptoms. So the total duration may be 17-24 days in this case.)    You should continue symptom monitoring until day 14 post-exposure. If you develop signs or symptoms of COVID-19, isolate and get tested (even if you have been tested already).    How to quarantine:   Stay home and away from others. Don't go to school or anywhere else. Generally quarantine means staying home from work but there are some exceptions to this. Please contact your workplace.  No hugging, kissing or shaking hands.  Don't let anyone visit.  Cover your mouth and nose with a mask, tissue or washcloth to avoid spreading germs.  Wash your  hands and face often. Use soap and water.    What are the symptoms of COVID-19?  The most common symptoms are cough, fever and trouble breathing. Less common symptoms include headache, body aches, fatigue (feeling very tired), chills, sore throat, stuffy or runny nose, diarrhea (loose poop), loss of taste or smell, belly pain, and nausea or vomiting (feeling sick to your stomach or throwing up).  After 14 days, if you have still don't have symptoms, you likely don't have this virus.  If you develop symptoms, follow these guidelines.  If you're normally healthy: Please start another eVisit.  If you have a serious health problem (like cancer, heart failure, an organ transplant or kidney disease): Call your specialty clinic. Let them know that you might have COVID-19.    Where can I get more information?  [facility] - About COVID-19: [email or web address]  CDC - What to Do If You're Sick: www.cdc.gov/coronavirus/2019-ncov/about/steps-when-sick.html  CDC - Ending Home Isolation: www.cdc.gov/coronavirus/2019-ncov/hcp/disposition-in-home-patients.html  CDC - Caring for Someone: www.cdc.gov/coronavirus/2019-ncov/if-you-are-sick/care-for-someone.html  [facility] clinical trials (COVID-19 research studies): clinicalaffairs.[facility]-clinical-trials  Below are the COVID-19 hotlines at the [facility] of [facility] ([facility]). Interpreters are available.  For health questions: Call 415-038-5656 or 1-640.372.9667 (7 a.m. to 7 p.m.)  For questions about schools and childcare: Call 904-410-7025 or 1-319.396.9297 (7 a.m. to 7 p.m.)    January 2, 2021  RE:  Tami Malik                                                                                                                   [address] [place] [address]      To whom it may concern:    I evaluated Tami Malik on 01/02/21. Tami Malik should be excused from work/school.    They should let their workplace manager and  "staffing office know when their quarantine ends.    We can not give an exact date as it depends on the information below. They can calculate this on their own or work with their manager/staffing office to calculate this. (For example if they were exposed on 10/04, they would have to quarantine for 14 full days. That would be through 10/18. They could return on 10/19.)    Quarantine Guidelines:    Patients (\"contacts\") who have been in close prolonged contact of an infected person(s) (within six feet for at least 15 minutes within a 24 hour period) and remain asymptomatic should enter quarantine based on the following options:      14-day quarantine period (this remains the CDC recommendation for the greatest protection against spread of COVID-19) OR    Minimum 7-day quarantine with negative RT-PCR test collected on day 5 or later OR    10-day quarantine with no test    Quarantine Guideline exceptions are as follows:  ? People whose high-risk exposure was a household member should always quarantine for 14 days from their last date of exposure  ? Residents of congregate care and congregate living settings should always quarantine for 14 days from their last date of exposure  ? Individuals who work in health care, congregate care, or congregate living should be off work for 14 days from their last date of exposure. Community activities for this group can be resumed based on options above.    Note: If there is ongoing exposure to the covid positive person, this quarantine period may be longer than 14 days. (For example, if they are continually exposed to their child, who tested positive and cannot isolate from them, then the quarantine of 7-14 days can't start until their child is no longer contagious. This is typically 10 days from onset to the child's symptoms. So the total duration may be 17-24 days in this case.)    Tami ANN Malik should continue symptom monitoring until day 14 post-exposure. If they develop signs " or symptoms of COVID-19, they should isolate and get tested (even if they have been tested already).    Sincerely,  Kay Youngblood PA-C

## 2021-06-14 NOTE — PROGRESS NOTES
"20 week OB    S: Feels well.  Sparingly using zofran for ongoing NVP.  Has started feeling fetal movement.  Denies uterine cramping, vaginal bleeding or leaking of fluid.  Reviewed normal FAS and expecting a boy!  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  Sent via .  O: Vitals: /66 (Patient Site: Left Arm, Patient Position: Sitting, Cuff Size: Adult Regular)   Pulse 78   Ht 5' 5.5\" (1.664 m)   Wt 194 lb (88 kg)   LMP 09/01/2020 (Exact Date)   BMI 31.79 kg/m    BMI= Body mass index is 31.79 kg/m .  Exam:  Constitutional: healthy, alert and no distress  Respiratory: respirations even and unlabored  Gastrointestinal: Abdomen soft, non-tender. Fundus measures appropriate for gestational age. Fetal heart tones hear without difficulty and within normal limits  : deferred  Psychiatric: mentation appears normal and affect normal/bright  A:   1. Encounter for supervision of other normal pregnancy in second trimester     2. History of postpartum hemorrhage     3. Hx of preeclampsia, prior pregnancy, currently pregnant, first trimester     4. Nausea and vomiting in pregnancy         P: Discussed 20 week fetal screen.   Encouraged patient to call with any questions or concerns.    Lottie Wade CNM   "

## 2021-06-14 NOTE — PATIENT INSTRUCTIONS - HE
Dear Tami Malik,    Your symptoms show that you may have coronavirus (COVID-19). This illness can cause fever, cough and trouble breathing. Many people get a mild case and get better on their own. Some people can get very sick.    Will I be tested for COVID-19?  We would like to test you for Covid-19 virus. I have placed orders for this test.     To schedule: go to your [employer] home page and scroll down to the section that says  You have an appointment that needs to be scheduled  and click the large green button that says  Schedule Now  and follow the steps to find the next available openings.    If you are unable to complete these [employer] scheduling steps, please call [phone] to schedule your testing.     Return to work/school/ guidance:  Please let your workplace manager and staffing office know when your quarantine ends     We can t give you an exact date as it depends on the above. You can calculate this on your own or work with your manager/staffing office to calculate this. (For example if you were exposed on 10/4, you would have to quarantine for 14 full days. That would be through 10/18. You could return on 10/19.)      If you receive a positive COVID-19 test result, follow the guidance of the those who are giving you the results. Usually the return to work is 10 (or in some cases 20 days from symptom onset.) If you work at [facility], you must also be cleared by Employee Occupational Health and Safety to return to work.        If you receive a negative COVID-19 test result and did not have a high risk exposure to someone with a known positive COVID-19 test, you can return to work once you're free of fever for 24 hours without fever-reducing medication and your symptoms are improving or resolved.      If you receive a negative COVID-19 test and If you had a high risk exposure to someone who has tested positive for COVID-19 then you can return to work 14 days after your last contact  with the positive individual    Note: If you have ongoing exposure to the covid positive person, this quarantine period may be more than 14 days. (For example, if you are continued to be exposed to your child who tested positive and cannot isolate from them, then the quarantine of 7-14 days can't start until your child is no longer contagious. This is typically 10 days from onset of the child's symptoms. So the total duration may be 17-24 days in this case.)    Sign up for [email or web address].   We know it's scary to hear that you might have COVID-19. We want to track your symptoms to make sure you're okay over the next 2 weeks. Please look for an email from [email or web address]--this is a free, online program that we'll use to keep in touch. To sign up, follow the link in the email you will receive. Learn more at [email or web address]    How can I take care of myself?    Get lots of rest. Drink extra fluids (unless a doctor has told you not to)    Take Tylenol (acetaminophen) or ibuprofen for fever or pain. If you have liver or kidney problems, ask your family doctor if it's okay to take Tylenol o ibuprofen    If you have other health problems (like cancer, heart failure, an organ transplant or severe kidney disease): Call your specialty clinic if you don't feel better in the next 2 days.    Know when to call 911. Emergency warning signs include:  o Trouble breathing or shortness of breath  o Pain or pressure in the chest that doesn't go away  o Feeling confused like you haven't felt before, or not being able to wake up  o Bluish-colored lips or face    Where can I get more information?  [facility] - About COVID-19:   [email or web address]    CDC - What to Do If You're Sick:   www.cdc.gov/coronavirus/2019-ncov/about/steps-when-sick.html      Dear Tami Malik,    Your symptoms show that you may have coronavirus (COVID-19). This illness can cause fever, cough and trouble breathing. Many people get  a mild case and get better on their own. Some people can get very sick.    Will I be tested for COVID-19?  We would like to test you for Covid-19 virus. I have placed orders for this test.     For all employees or close contacts (except [employer] and [employer] - see below), go to your [employer] home page and scroll down to the section that says  You have an appointment that needs to be scheduled  and click the large green button that says  Schedule Now  and follow the steps to find the next available opening.     If you are unable to complete these steps or if you cannot find any available times, please call [phone] to schedule employee testing.     [employer] employees or close contacts, please call [phone].   [employer] ([employer]) employees or close contacts call [phone].    Return to work/school/ guidance:  Please let your workplace manager and staffing office know when your quarantine ends     We can t give you an exact date as it depends on the above. You can calculate this on your own or work with your manager/staffing office to calculate this. (For example if you were exposed on 10/4, you would have to quarantine for 14 full days. That would be through 10/18. You could return on 10/19.)      If you receive a positive COVID-19 test result, follow the guidance of the those who are giving you the results. Usually the return to work is 10 (or in some cases 20 days from symptom onset.) If you work at [employer], you must also be cleared by Employee Occupational Health and Safety to return to work.        If you receive a negative COVID-19 test result and did not have a high risk exposure to someone with a known positive COVID-19 test, you can return to work once you're free of fever for 24 hours without fever-reducing medication and your symptoms are improving or resolved.      If you receive a negative COVID-19 test and If you had a high risk exposure to someone who has tested positive for  COVID-19 then you can return to work 14 days after your last contact with the positive individual    Note: If you have ongoing exposure to the covid positive person, this quarantine period may be more than 14 days. (For example, if you are continued to be exposed to your child who tested positive and cannot isolate from them, then the quarantine of 7-14 days can't start until your child is no longer contagious. This is typically 10 days from onset of the child's symptoms. So the total duration may be 17-24 days in this case.)    Sign up for [employer].   We know it's scary to hear that you might have COVID-19. We want to track your symptoms to make sure you're okay over the next 2 weeks. Please look for an email from [employer]--this is a free, online program that we'll use to keep in touch. To sign up, follow the link in the email you will receive. Learn more at [email or web address]    How can I take care of myself?    Get lots of rest. Drink extra fluids (unless a doctor has told you not to)    Take Tylenol (acetaminophen) or ibuprofen for fever or pain. If you have liver or kidney problems, ask your family doctor if it's okay to take Tylenol o ibuprofen    If you have other health problems (like cancer, heart failure, an organ transplant or severe kidney disease): Call your specialty clinic if you don't feel better in the next 2 days.    Know when to call 911. Emergency warning signs include:  o Trouble breathing or shortness of breath  o Pain or pressure in the chest that doesn't go away  o Feeling confused like you haven't felt before, or not being able to wake up  o Bluish-colored lips or face    Where can I get more information?  [facility] - About COVID-19:   [email or web address]    CDC - What to Do If You're Sick:   www.cdc.gov/coronavirus/2019-ncov/about/steps-when-sick.html    January 25, 2021  RE:  Tami Malik                                                                                                                   [address] [place] [address]      To whom it may concern:    I evaluated Tami Malik on 01/25/21. Tami Malik should be excused from work/school.     They should let their workplace manager and staffing office know when their quarantine ends.    We can not give an exact date as it depends on the information below. They can calculate this on their own or work with their manager/staffing office to calculate this. (For example if they were exposed on 10/04, they would have to quarantine for 14 full days. That would be through 10/18. They could return on 10/19.)    Quarantine Guidelines:      If patient receives a positive COVID-19 test result, they should follow the guidance of those who are giving the results. Usually the return to work is 10 (or in some cases 20 days from symptom onset.) If they work at [employer], they must be cleared by Employee Occupational Health and Safety to return to work.        If patient receives a negative COVID-19 test result and did not have a high risk exposure to someone with a known positive COVID-19 test, they can return to work once they're free of fever for 24 hours without fever-reducing medication and their symptoms are improving or resolved.      If patient receives a negative COVID-19 test and if they had a high risk exposure to someone who has tested positive for COVID-19 then they can return to work 14 days after their last contact with the positive individual    Note: If there is ongoing exposure to the covid positive person, this quarantine period may be longer than 14 days. (For example, if they are continually exposed to their child, who tested positive and cannot isolate from them, then the quarantine of 7-14 days can't start until their child is no longer contagious. This is typically 10 days from onset to the child's symptoms. So the total duration may be 17-24 days in this  case.)    Sincerely,  Lottie Dalton MD

## 2021-06-15 NOTE — TELEPHONE ENCOUNTER
Called and left VM. Can complete at next visit. Not urgent. Part of baseline Pre-E labs.  Grace Norman, MARNIE,CNM

## 2021-06-15 NOTE — PROGRESS NOTES
"28 week OB    S: Feels well,  Baby active.  Denies uterine cramping, vaginal bleeding or leaking of fluid.  Had first COVID vaccine and will get the second on Thursday so wants to delay Tdap until next visit.  PC ratio collected today due to error in collection at last visit.  Discussed patient options for labor pain relief. Hopefull for water birth, but knows unlikely. Plans on using NO for pain control.  Discussed prenatal education options and encouraged Tami and Donovan to start going over plans for possible comfort measures to utilize in labor.  Reviewed [facility] and encouraged to note patterns of FM to establish what is normal between now and 32 weeks.  O: Vitals: /80   Pulse 88   Ht 5' 5.5\" (1.664 m)   Wt 207 lb 6.4 oz (94.1 kg)   LMP 2020 (Exact Date)   BMI 33.99 kg/m    BMI= Body mass index is 33.99 kg/m .  Exam:  Constitutional: healthy, alert and no distress  Respiratory: respirations even and unlabored  Gastrointestinal: Abdomen soft, non-tender. Fundus measures appropriate for gestational age. Fetal heart tones heard without difficulty and within normal limits  Psychiatric: mentation appears normal and affect normal/bright  A: 24 YO  28weeks with an SIUP  Hx of preeclampsia  Hx of PPH  28 week GCT, Hgb, RPR    P: GCT/repeat RPR today, handout provided.Tdap will be given next visit, reviewed CDC recommendations and partner/family vaccination recommended as well.  Need for Rhogam? No   Call if concerns about FM.  Encouraged patient to call with any questions or concerns.  Return to clinic 2 weeks    Patient was seen with student Celia Bueno who was present for learning.  I personally assessed, examined and made clinical decisions reflected in the documentation.     MARNIE Ahn CNM      "

## 2021-06-15 NOTE — TELEPHONE ENCOUNTER
[facility] called, the UA specimen got lost/left in the pass box at the Lab  the other day and so it did not get tested. Should pt return to Lab or can it be done at a future [facility] appt? Pls call pt to inform/advise.

## 2021-06-15 NOTE — PROGRESS NOTES
Tami Malik presents to clinic alone at 24w0d for a routine prenatal appointment. She reports she is feeling well and has no concerns. Normal fetal movement. Denies bleeding, LOF, or cramping. Denies headache / vision changes / RUQ pain. Nausea resolved!    Reviewed total weight gain of 8 lb 11.2 oz (3.946 kg). Within range for expected total weight gain of 11 lb (5 kg)-19 lb (9 kg)    History of preeclampsia: Taking baby aspirin daily. currently asymptomatic. Blood pressures at home and work have been WNL. Accepts baseline labs today.    History of PPH: accepts recommendation for IV placement, AMTSL, TXA. Will supplement with iron if hemoglobin is low or borderline low today.  Pregnancy checklist updated. Reviewed tdap, gct, rpr at next visit-- accepts all. Warning signs reviewed. RTC in 4 weeks, sooner if problems.

## 2021-06-16 PROBLEM — F39 UNSPECIFIED MOOD (AFFECTIVE) DISORDER (H): Status: ACTIVE | Noted: 2020-02-21

## 2021-06-16 PROBLEM — N83.201 CYST OF RIGHT OVARY: Status: ACTIVE | Noted: 2020-11-20

## 2021-06-16 PROBLEM — O09.291 HX OF PREECLAMPSIA, PRIOR PREGNANCY, CURRENTLY PREGNANT, FIRST TRIMESTER: Status: ACTIVE | Noted: 2017-11-16

## 2021-06-16 PROBLEM — R87.612 LOW GRADE SQUAMOUS INTRAEPITHELIAL LESION ON CYTOLOGIC SMEAR OF CERVIX (LGSIL): Status: ACTIVE | Noted: 2017-07-31

## 2021-06-16 PROBLEM — Z34.83 ENCOUNTER FOR SUPERVISION OF OTHER NORMAL PREGNANCY IN THIRD TRIMESTER: Status: ACTIVE | Noted: 2020-11-20

## 2021-06-16 PROBLEM — Z87.59 HISTORY OF POSTPARTUM HEMORRHAGE: Status: ACTIVE | Noted: 2017-11-17

## 2021-06-16 NOTE — PATIENT INSTRUCTIONS - HE
"[facility]  Contact information:  Appointment line and to get a hold of CNM in clinic Monday-Friday 8 am - 5 pm:  [phone].  There are some clinics with early start times (1st appointment 7:40 am) and others with evening hours (last appointment 6:20 pm).  Most are typically open from 8 am to 5 pm.    CNM on call answering service: [phone].  Specify your hospital of choice and leave a brief message for CNM;  will then page CNM who is on call at your specified hospital and you should receive a call back with 15 minutes.  Be sure that your ringer is audible and that you can accept blocked calls so that we can get back in touch with you! This number should be reserved for urgent needs if during the day, before 8 am, after 5 pm, weekends, holidays.    Contact the on-call CNM with warning signs, such as:    vaginal bleeding     Vaginal discharge and itching or pain and burning during urination    Leg/calf pain or swelling on one side    severe abdominal pain    nausea and vomiting more than 4-5 times a day, or if you are unable to keep anything down    fever more than 100.4 degrees F.        Meet the Midwives from [facility]  You are invited to an informational meet and greet with [facility] Certified Nurse-Midwives. Our free \"Meet the Midwives\" event is a great opportunity to learn about our midwives' philosophy and experience, the hospitals where we can assist with your birth, and answer questions you may have. Partners, friends, and family are welcome to attend. Currently, this is a virtual event.  Date  First Tuesday of every month at 7 pm.    Link to next (live) meeting  [email or web address]/classes-and-[email or web address]/meet-the-midwives-from-[facility]  To Join by Telephone (audio only) Call:   [phone] Phone Conference ID: [id number]#        Touring the [facility]  At this time we are offering a virtual " tour of the Maternity Care Centers at both [facility] and [facility]:   [facility]: https://www.[employer].org/Locations/[facility]/Maternity-Care-Center  [facility]:   https://Sureline Systems.org/[facility]-care/the-birthplace/tours  https://www.[employer].org/Locations/[facility]/Maternity-Care-Center/#virtual_tour  When in person tours become available, registrations is required. To schedule a tour at either [facility] or [facility], please do so online using the following links:  [facility] - [email or web address]&[email or web address]&[email or web address]&[email or web address]&it=[email or web address]&skk=891  [facility] - [email or web address]&[email or web address]&[email or web address]&ps=[email or web address]&[email or web address]&it=1&sxx=319       DAILY FETAL MOVEMENT COUNTING    One of the best ways to keep track of a healthy baby is to notice its movements. Healthy babies are very active. However, some perfectly normal babies may sleep quietly as long as 60 minutes without moving. Babies who are having problems are sluggish and move less. Counting these movements can provide your nurse-midwife with a warning of developing problems.    You should begin this counting at the around your 7th to 8th month of your pregnancy (28-32 weeks) if you are ever concerned that there is less movement than normal for your baby.     INSTRUCTIONS    1.  If able, drink 2 glasses of fluid and lie down on one side.  Put your hand on your abdomen.  2. Count 10 separate times that the baby moves. A movement may be a kick, turn, or flip of the baby.  3.  Record the time you feel the 10th movement. When you count the 10th movement, stop counting.  4.  If one hour passes with less than 10 movements, drink a large glass of fruit juice. Then count for the another hour. If you do not reach 10 movements, call the midwives    REMEMBER      The baby may move all 10 times in an hour or less.    The baby may take up to five hours to  move 10 times.    The important thing is to know what is normal for your baby so you can tell your nurse-midwife when something different is happening.    CALL THE NURSE-MIDWIFE IF:      You do not feel 10 movements in five hours.    You have not felt the baby move all day.    It is taking longer and longer each day to get the 10th movement.      Pregnancy: Your Third Trimester Changes  How You Are Changing  Your body is preparing for the birth of your baby. Some of the most common changes are listed below. If you have any questions or concerns, ask your midwife.    You ll gain more weight from fluids, extra blood, and fat deposits. Your baby will gain an average of an ounce per day (a half a pound a week)!    Your breasts will grow as your body gets ready to feed the baby. They may be more tender. You may also notice a slight yellow or white discharge from the nipples.    Discharge from your vagina may increase. This is normal.    You might see some skin color changes on your forehead, cheeks, or nose. Most of these will go away after you deliver.  How Your Baby Is Growing    Month 7  Your baby is about 14 inches long. If born prematurely (too early), your baby would likely survive with special care.   Month 8  Your baby is building up body fat. Baby kicks strongly, but is getting too big to move around much.   Month 9  Your baby weighs nearly 7 pounds and is about 18-20 inches long. In other words, any day now...       UNDERSTANDING  LABOR    Going into labor before your 37th week of pregnancy is called  labor.  labor can cause your baby to be born too soon. This can lead to a number of health problems that may affect your baby. From 28-35 weeks, Patients are advised to be evaluated at [facility] since they have a  Intensive Care Unit (NICU).  -Before labor, the cervix is thick and closed.  -In  labor, the cervix begins to efface (thin) and dilate (open)  over a short period of time    Are You At Risk?  Any pregnant woman can have  labor. It may start for no reason. But these risk factors can increase your chances:    Past  labor or past early birth    Smoking and drug or alcohol use during pregnancy    Multiple fetuses (twins or more)    Problems with the shape of the uterus    Bleeding during the pregnancy    The Dangers of  Birth  A baby born too soon may have health problems. This is because the baby didn t have enough time to mature. The baby then is at risk of:    Not breastfeeding well    Having immature lungs    Bleeding in the brain    Dying    Reaching Term  Your goal is to get as close to term as you can before giving birth. The closer you get to term, the higher your chance of having a healthy baby. Work with your healthcare provider. Together, you can take steps that may keep you from giving birth too early.    Symptoms of  Labor  If you believe you re having  labor, contact the midwives right away. But contractions alone don t mean you re in  labor. What matters more are changes in your cervix (the lower end of the uterus).   Symptoms of  labor include:    five or more contractions per hour    Strong & frequent contractions    Constant menstrual-like cramping    Low-back pain        Mucous or bloody vaginal discharge    Bleeding or spotting in the second or third trimester    Evaluating  Labor  Your midwife will try to find out whether you re in  labor or whether you re just having contractions.She may watch you for a few hours. The following tests may be done:    Pelvic exam to see if your cervix has effaced (thinned) and dilated (opened)    Uterine activity monitoring to detect contractions    Fetal monitoring to check the health of your baby    Ultrasound to check your baby s size and position    Caring for Yourself At Home  If you have contractions  but your cervix is still thick  and closed, the midwife may ask you to do the following at home:    Drink plenty of water.    Do fewer activities.    Rest in bed on your side.    Avoid intercourse and nipple stimulation.    When to Call Your Midwife    Five or more contractions per hour    Bag of water breaks    Bleeding or spotting     If You Need Hospital Care   labor often requires that you have hospital care and complete bed rest. You may have an IV (intravenous) line to get fluids. And you may be given pills or an injection to help prevent contractions. Finally, you may receive medication (corticosteroids) that helps your baby s lungs mature more quickly.    Syphilis Screening:   The [place] Department of Health ([facility]) provided new recommendations for screening during pregnancy. If you are pregnant, [facility] recommends testing three times during your pregnancy: at your first visit, 28 weeks, and after delivery.   Syphilis is:     Caused by a bacteria spread by sexual contact    Rising among [place] women of child-bearing age  Syphilis can:     Be passed on to infants during pregnancy or during delivery and can be life threatening    Be cured. There are ways to protect yourself and your babies.        HEALTHY PREGNANCY CARE: 26-30 WEEKS PREGNANT    You are now in your last trimester of pregnancy. Your baby is growing rapidly, can open and close her eyelids, sometimes get hiccups, and you'll probably feel her moving around more often. Your baby has breathing movements and is gaining about one ounce each day. You may notice heartburn and leg cramps. Your back may ache as your body gets used to your baby's size and length.    Discuss your work situation with your midwife or physician as needed. If you stand for long periods of time, you may need to make changes and take breaks.    Pre-register after 30 weeks online at the hospital where your baby will be born [email or web address]      Be aware of your baby's  activity level. You may be asked to do daily fetal movement counts. Contact your midwife or physician about any decreased movement.    You may have been tested for gestational diabetes today. If you are RH negative, you may have had an additional test and a Rhogam injection.    Consider receiving a Tdap vaccination to protect your baby from Pertussis/whooping cough.    Baby Feeding in the Hospital: Information, Support and Resources    As you prepare for the birth of your child, you will want to consider options for feeding your baby including breast-feeding and/or baby formula. The American Academy of Pediatrics recommends exclusive breast-feeding for the first six months (although any amount of breast-feeding is beneficial).  However, we also understand that breast-feeding is a personal choice and not for everyone. Whether or not you choose to breast-feed, your decision will be respected by our staff.    There are numerous benefits of breast-feeding; here are a few to consider:    Provides antibodies to protect your baby from infections and diseases    The cost: formula can cost over $1,500 per year    Convenience, no warming up or sterilizing bottles and supplies    The physical contact with breastfeeding can make babies feel secure, warm and comforted    What ever my feeding choice, what can I expect after I deliver my baby?    Your baby will usually be placed skin-to-skin immediately following birth. The skin to skin contact between you and your baby will be a special and memorable time. The bonding and attachment comforts your baby and has a positive effect on baby s brain development.     Having your baby  room in  with you also helps you start to learn your baby s body rhythms and sleep cycle.      You will also begin to learn your baby s cues (signals) that he or she is ready to feed.    When do I start to feed my baby?  As soon as possible after your baby s birth, you will be encouraged to begin feeding.   In the first couple of weeks, your baby will eat often.  Breastfeeding babies usually eat at least 8 times in 24 hours.  Babies fed formula usually eat at least 7 times in 24 hours.      Breast-feeding tips:    Get comfortable and use pillows for support.    Have your baby at the level of your breast, facing you,  tummy to tummy .      Touch your nipple to your baby s lips so you baby s mouth opens wide (rooting reflex).  Aim the nipple toward the roof of your baby s mouth. When your baby opens his or her mouth, pull your baby toward your breast to help your baby  latch on  to your nipple and much of the areola area.    Hand expressing your breast milk can assist with latching your baby to your breast, if needed.    Ask for help, breastfeeding may seem awkward or uncomfortable at first, this is normal. There are numerous resources available at [facility] and beyond.     If your goal is to exclusively breastfeed, avoid using any formula or artificial nipples (including bottles and pacifiers) while you are your baby are learning to breastfeed unless there is a medical reason.       Mixing breastfeeding and formula can interfere with how you begin building your milk supply.  It can impact how you and your baby  learn  to breastfeeding together and alter the natural growth of  good  bacteria in your baby s stomach.    Delay a pacifier or a bottle in the first few weeks until breastfeeding is well established. This is often around 3 weeks of age.    Ask your nurse to show you how to hand express.   Breast milk can be kept in the refrigerator or freezer for later use.    Hospital and Clinic  Resources:  -Schedule an appointment with a [facility] CNM who is also a Lactation Consultant by calling [phone]     -Schedule a clinic appointment with a [facility] CNM with dedicated clinic hours for breastfeeding assistance by calling [phone]. Breastfeeding clinic visits are at [facility] on  Mondays, [facility] on Tuesdays and [facility] on Thursdays.     -Baby Café    Pregnant and interested in breastfeeding?  Need answers to breastfeeding questions?  Want to help breastfeeding moms?  Already breastfeeding and want to meet other moms?    Join us at the Baby Café!    Baby Cafe is a free, drop-in service offering breast-feeding support for pregnant women, breast-feeding mothers and their families.  Come share tips and socialize with other mothers.  Babies and siblings are welcome (no childcare available).    Starting April 2018, Baby Café will be at 4 locations.  Please see below for the Baby Café closest to you!    [facility]  [address] [place] [address]  1st Wednesday: 10am-12pm    [facility]  [address] [place] [address]  3rd Wednesday 4-6pm    [facility]  [address] [place] [address]  4th Wednesday 10am-12:30pm    [facility]  [address] [place] [address]  4th Wednesdays: 4-6pm      Hmong, Kiswahili, and Greek which is may be available at some sites.    For more information, please contact: [email or web address]. or [phone]    -Attend a baby weigh in at [facility].  Lactation consultants are available to answer questions  [facility]: Tuesdays 1:00 - 2:00  [facility]: Mondays 1:00 - 2:00  www.[facility].Surveying And Mapping (SAM)    -Attend one of the New Mama groups at [facility] in [facility].  [facility] also offers one-on-one in home and in office lactation consults.   www.[facility].com    -Attend a LeLeche League meeting.  Multiple groups in several locations throughout the [facility]. The meetings are no-cost and always informative breastfeeding education session through Intern[employer] La Leche League  Www.madina.org/  Medication use while breastfeeding: http://toxnet.nlm.nih.gov/newtoxnet/lactmed.htm     Online Resources:    [facility].org/baby sign  up for free online weekly e-mail    [facility].org/maternity    Breastfeedingmadesimple.com    Taxify.Skedo (La Leche League)    Normalfed.com    Womenshealth.gov/breastfeeding    Workandpump.com    Breast-feeding Supplies & Pumps:  Talk to your insurance provider or WIC (Women, Infants and Children) to learn more about options available to you. Recent health insurance changes may include additional coverage for supplies and pumps.    Public Health:  Women, Infants and Children Nutrition program (WIC): provides breast-feeding support and education in addition to formal feeding moms. 373-KTO-5820 or http://www.health.[facility]./divs/fh/wic    Family Health Home Visiting: [facility] Nurse home visits are available. Talk to your provider to see if you qualify. Most [facility] have a program available.    Additional Resources:  La Leche League is an international, nonprofit, nonsectarian organization offering information, education, and support to mothers who want to breast-feed their babies. Local groups offer phone help and monthly meetings. Visit [employer] or WhoseView.ie and us the  Find local support  drop down menu or click on the  Resources  tab.    [place] Breastfeeding Resources: 1-764-872-BABY (0285) toll free    National Breastfeeding Help Line trained breastfeeding peer counselors can help answer common breast-feeding questions by phone. Monday-Friday: English/Portuguese  [phone]- [phone] toll free, 1-421.661.2133 (TTY)    [facility] Connection: 175-946-[facility] (3986)      Resources:    Childbirth and Parenting Education:   [facility]: http://[facility] Medical CenterArcSight.Mutations Studio/   (000) 306-BABY  [place]: (education, yoga & wellness) www.Viva Visiona.Mutations Studio  Enlightened Mama: www.enlightenedmama.com   Childbirth collective: (Parent topic nights)  www.childbirthcollective.org/  Hypnobabies:  www.hypnobabiestwincities.com/  Hypnobirthing:  Http://hypnobirthing.com/  The Birth Hour:  https://thebirthProtez Pharmaceuticals.CompBlue/online-childbirth-class/    APPS and Podcasts:   Ovia  Glow Nurture  The Birth Hour (for birth stories)   Birthful   Expectful   The Longest Shortest Time  PregnancyPodcast Michelle Hyde    Book Recommendations:   Makayla Xavi's Birthing From Within--first few chapters include a new-age tone, you may prefer to skip it and keep going, because there is good stuff later.  This book recommendation covers emotional preparation, but does cover coping with pain, and use of both pharmacological and nonpharmacological methods.    Dr. Garcia' The Pregnancy Book and The Birth Book--the pregnancy book goes month-by month    Womanly Art of Breastfeeding by La Leche League International   Bestfeeding by Lalita Gibbs--great pictures    Mothering Your Nursing Toddler, by Vidhi Enriquez.   Addresses dealing with so many of the challenging behaviors of a nursing toddler.  How Weaning Happens, by La Leche League.  Discusses weaning at all ages, from medically necessary weaning of an infant, all the way up to age 5 (or older), with why/why not, and strategies.  Very empowering book both for deciding to wean and deciding not to.    American College of Nurse-Midwives (ACNM) http://www.midwife.org/; look at the informational handouts at http://www.midwife.org/Share-With-Women     www.mymidwife.org    Mother to Baby (Medication and Herbal guidance in pregnancy): http://www.mothertobaby.org  Toll-Free Hotline: 551.442.3319  LactMed (Medication use while breastfeeding): http://toxnet.nlm.nih.gov/newtoxnet/lactmed.htm    Women's Health.gov:  http://www.womenshealth.gov/a-z-topics/index.html    American pregnancy association - http://americanpregnancy.org    Centering Pregnancy (group prenatal care option): http://centeringhealthcare.org    Information about doulas:  Childbirth collective: http://www.childbirthcollective.org/  Doulas of [place] (CATHERINE):  www.catherine.org  [place] [facility]  project:  "http://AvubadoDiveboard.MokhaOrigin/     Early Childhood and Family Education (ECFE):  ECFE offers parents hands-on learning experiences that will nourish a lifetime of teachable moments.  http://ecfe.info/ecfe-home/    March of Dimes www.Results Scorecard     FDA - Nutrition  www.mypyramid.gov  Under \"For Consumers,\" click on \"pregnant and breastfeeding women.\"      Centers for Disease Control and Prevention (CDC) - Vaccines : http://www.cdc.gov/vaccines/       When researching information on the web, question the validity of websites.  The [employer] .gov, .PrintLess Plans and.org tend to be more reliable information.  If there are a lot of advertisements, be cautious of the information provided. Stay away from blogs and chat rooms please!             Virtual Breastfeeding Support:    During this time of isolation, breastfeeding families need even more community!  Here are some area organizations offering virtual support groups for breastfeeding:      Lat Cafe Support Group, Tuesdays at 10:30 am   Run by DOYLE Pena of [employer] Baby [employer]isperer Lactation Consult[employer]   Go to [employer] Whispe[employer] Lactation [employer] Facebook page and click on \"events\" for link   https://www.Penboost.com/events/045862109980277/    [facility] Milk Hour, Thursdays at 2:30 pm    Run by DOYLE Jacobson   Go to [facility] FB page and send message to get link   https://www.Penboost.com/healthfoundations/    LaLeche ann [facility] holding virtual meetings the first Tuesday of each month, 8-9 pm, and the   Third Saturday, 10 - 11 am.  Go to [facility] FB page; message to get link [email or web address]://www.[email or web address]/HarlanfGDeepali/?hc_location=[facility]    [facility] offers a Lactation Lounge every Friday 12pm - 1pm, run by Eugenio Aden Leader   Sign up via link at " [employer]cbe-lactation   https://www.[employer]./cbe-lactation    [facility] is offering virtual support groups every Monday, 10:30 am - 12 pm, run by nurse DOYLE   Https://www.facebook.com/events/575908017915129/    Prenatal Breastfeeding Classes:        [employer] is offering virtual breastfeeding and  care classes:  https://www.[employer]./education-workshops    BirthEd childbirth and breastfeeding education offering virtual prenatal breastfeeding classes  https://www.[employer]n.com/workshops

## 2021-06-16 NOTE — PROGRESS NOTES
"  32 week OB    S: Feels well.  More fatigued, however.  Notes that her hips are sore more with this pregnancy than with the others.  Recommended daily stretching and chiropractic care.  Discussed that overall her weight gain is excessive, however it is trending more toward recommended levels.  Given water birth information to review.  Also notes that on occasion she will see stars flash through her vision, but brief.  She will take her blood pressure at home or work and it is always normal.  Advised to keep an eye on this and notify CNM if this is prolonged or blood pressure is elevated.  Baby active.  Denies uterine cramping, vaginal bleeding or leaking of fluid.   O: Vitals: /76   Pulse 92   Ht 5' 5.5\" (1.664 m)   Wt 207 lb 12.8 oz (94.3 kg)   LMP 09/01/2020 (Exact Date)   BMI 34.05 kg/m    BMI= Body mass index is 34.05 kg/m .  Exam:  Constitutional: healthy, alert and no distress  Respiratory: respirations even and unlabored  Gastrointestinal: Abdomen soft, non-tender. Fundus measures appropriate for gestational age. Fetal heart tones hear without difficulty and within normal limits  : Deferred  Psychiatric: mentation appears normal and affect normal/bright  A:     ICD-10-CM    1. Encounter for supervision of other normal pregnancy in second trimester  Z34.82    2. Hx of preeclampsia, prior pregnancy, currently pregnant, first trimester  O09.291      P: Discussed plans for labor. Discussed patient options for postpartum contraception. Patient is planning vasectomy  Reviewed 32 week checklist and updated Details list  Encouraged patient to call with any questions or concerns.  Discuss AMTSL at next visit  Return to clinic 2 weeks    Lottie Wade CNM   "

## 2021-06-16 NOTE — PATIENT INSTRUCTIONS - HE
"[facility] Nurse Mid[facility]  Contact information:  Appointment line and to get a hold of CNM in clinic Monday-Friday 8 am - 5 pm:  [phone].  There are some clinics with early start times (1st appointment 7:40 am) and others with evening hours (last appointment 6:20 pm).  Most are typically open from 8 am to 5 pm.    CNM on call answering service: [phone].  Specify your hospital of choice and leave a brief message for CNM;  will then page CNM who is on call at your specified hospital and you should receive a call back with 15 minutes.  Be sure that your ringer is audible and that you can accept blocked calls so that we can get back in touch with you! This number should be reserved for urgent needs if during the day, before 8 am, after 5 pm, weekends, holidays.    Contact the on-call CNM with warning signs, such as:    vaginal bleeding     Vaginal discharge and itching or pain and burning during urination    Leg/calf pain or swelling on one side    severe abdominal pain    nausea and vomiting more than 4-5 times a day, or if you are unable to keep anything down    fever more than 100.4 degrees F.          Meet the Midwives from [facility]  You are invited to an informational meet and greet with [facility] Certified Nurse-Midwives. Our free \"Meet the Midwives\" event is a great opportunity to learn about our midwives' philosophy and experience, the hospitals where we can assist with your birth, and answer questions you may have. Partners, friends, and family are welcome to attend. Currently, this is a virtual event.  Date  First Tuesday of every month at 7 pm.    Link to next (live) meeting  https://[email or web address]/classes-and-[email or web address]/meet-the-midwives-from-[facility]  To Join by Telephone (audio only) Call:   [phone] Phone Conference ID: [id number]#          Touring the Maternity Care Center  At this time we are offering a " "virtual tour of the Maternity Care Centers at both [facility] and [facility]:   [facility]: [email or web address]://www.[email or web address]/Locations/[facility]/Maternity-Care-Center  [facility]:   [email or web address]/[facility]/the-birthplace/tours  https://www.[facility].org/Locations/[facility]/Maternity-Care-Center/#virtual_tour  When in person tours become available, registrations is required. To schedule a tour at either [facility] or [facility], please do so online using the following links:  [facility] - [email or web address]&[email or web address]  [facility] - [email or web address]&[email or web address]&[email or web address]&[email or web address]&[email or web address]&tgi=341      Pre-registration for Hospital Stay:  Sometime betweeen 30-37 weeks, it is recommended that you \"pre-register\" for your upcoming hospital stay on our website:    [email or web address]    Breastfeeding and Birth Control  How do I decide what birth control method is best for me while I am breastfeeding?  Choosing a method of birth control is very personal. First, answer the following questions:      Do you want to have more children?    How much spacing between births do you want for your children?    Do you smoke or have you had any health problems, such as liver disease or a blood clot?  Talk about the answers to each of these questions with your health care provider to help you choose the best method for you.    Can I use breastfeeding as my birth control?  Using breastfeeding as your birth control (the lactational amenorrhea method) can be a good way to keep from getting pregnant in the first months after the baby is born. Each time your baby nurses, your body releases a hormone called prolactin, which stops your body from making the hormones that cause you to ovulate (release an egg). If you are not ovulating, you " cannot get pregnant.    The lactational amenorrhea method works only if:    you have not started your period yet.    you are breastfeeding only and not giving your baby any other food or drink.    you are breastfeeding at least every 4 hours during the day and every 6 hours at night.    your baby is less than 6 months old.  When any 1 of these 4 things is not happening, you no longer have good protection from getting pregnant, and you should use another form of birth control.    What birth control methods are safe for me to use while I breastfeed?    Methods without hormones  Methods without hormones do not affect you, your baby, or your breastfeeding.  Methods without hormones that are the most effective    The copper intrauterine contraceptive device (IUD) (ParaGard) is a small, T-shaped device that is in- serted into your uterus (womb) through the vagina and cervix. The copper IUD lasts for 10 years.    Sterilization (getting your tubes tied or your partner having a vasectomy) is very effective, but it is per- manent. You should choose sterilization only if you do not want to have more children.  A method without hormones that is effective    The lactational amenorrhea method described above is effective for the first 6 months.  Methods without hormones that are less effective    Natural family planning is monitoring your body for signs of ovulation and not having sex when you think you are ovulating. This method is reliable only if you are having regular periods every month.    Barrier methods (condoms, diaphragms, sponges, and spermicides) are used at the time you have sex. These methods are effective only if you use them correctly every time.    Methods with hormones  Birth control methods that use hormones can be used while you are breastfeeding. They may have a small effect on lowering the amount of milk you make. All hormones will get into your breast milk in very small amounts, but there is no known harm  to your baby from this small amount of hormone in breast milk.only methodsmethods use only 1 hormone, called progestin. You can start them right after your baby is born or wait 4 to 6 weeks to make sure your milk supply is good.     Progestin-only pills ( minipills ): If you like to take pills every day, you can use the minipill. In order forpill to work well, you have to take 1 at the same time each day. When you stop breastfeeding, you should start pills that have both estrogen and progestin because they are better at keeping you from get- ting pregnant.     Progestin IUD (Mirena): The progestin IUD is shaped and inserted into the uterus like the copper IUD. It works for up to 5 years. Both IUDs are usually inserted 4 to 6 weeks after the baby is born.    Progestin implant (Nexplanon): The progestin implant is a small matchstick-sized flexible gigi. It is placed into the fatty tissue in the back of your arm. It works for up to 3 years.    Progestin shot (Depo-Provera): The progestin shot is given every 3 months.    estrogen and progestin methods  These methods use 2 hormones, called estrogen and progestin.     These methods increase your risk of a blood clot, which is already higher than normal after you have a baby. You should not use them until your baby is at least 6 weeks old. The combined methods are not recommended as the first choice for women who are breastfeeding. If a combined method is the one that you feel will be best for you to prevent getting pregnant, these methods are okay to use while breastfeeding.     Combined birth control pills: You take a pill each day.    Vaginal ring (NuvaRing): The ring is worn in the vagina for 3 weeks then left out for 1 week before youin a new ring.    Patch (Ortho Evra): The patch is placed on your skin and changed every week for 3 weeks then left off forweek before putting a new patch on a different area of your skin.    Pediatric Care Providers at [facility]  Nurse Midwives [facility]:    Choosing the right provider is one of the most important decisions you ll make about your health care. We can help you find the right one. Remember, you re looking for a provider you can trust and work with to improve your health and well-being, so take time to think about what you need. Depending on how complicated your health care needs are, you may need to see more than one type of provider.    Primary Care Providers: You ll see a primary care provider first for most health issues. They ll work with you to get your recommended screenings, help you manage chronic conditions, and refer you to other types of providers if you need them. Your primary care provider may be called a family physician or doctor, internist, general practitioner, nurse practitioner, or physician s assistant. Your child or teenager s provider may be called a pediatrician.  Specialists: You ll see a specialist for certain services or to treat specific conditions. Specialists include cardiologists, oncologists, psychologists, allergists, podiatrists, and orthopedists. You may need a referral from your primary care provider before you go to a specialist in order for your health plan to pay for your visit.\pardHere are some tips for finding a provider where you live:  If you already have a provider you like and want to keep working with, call their office and ask if they accept your coverage.  Call your insurance company or state Medicaid and CHIP program. Look at their website or check your member handbook to find providers in your network who take your health coverage.  Ask your friends or family if they have providers they like and use these tools to compare health care providers in your area.    Family Medicine at  [facility] Nurse Midwives [facility]:     [email or web address]/[email or web address]-medicine    Many of our families enjoy all seeing the same doctor, who comes to know the whole family very  well. We base our practice on the knowledge of the patient in the context of family and community.  WHY CHOOSE A FAMILY MEDICINE PHYSICIAN?  Ability of the whole family to see the same doctor  Focus on the whole person, including physical and emotional health  A personal relationship with their doctor that is nurtured over time  Respect for individual and family beliefs and values  No need to change primary care providers when a certain age is reached  Coordination of care when other health care services are needed    Pediatrics at  [facility]:   [email or web address]://[email or web address]/[email or web address]/[email or web address]-care    Through a teaching affiliation with the [facility], [facility] staff keeps current on new developments in the field of pediatrics.     Everything we do centers around caring for children. We place special emphasis on wellness and prevention.pediatric care team includes a team of pediatricians and certified nurse practitioners who provide care to pediatric and adolescent patients ages 0 to 18, and some up to the age of 26. We offer preventive health maintenance for healthy children as well the diagnosis and treatment of common and chronic illnesses and injuries. In addition, we also offer several pediatric specialists who focus on adolescent health issues and developmental and behavioral issues.    Circumcision    Educational video:     https://www."astamuse company[employer] ltd.".[email or web address]    What is circumcision?  At birth, baby boys have loose skin that covers the head of the penis. This skin is called the foreskin. When all or part of the foreskin of the penis is cut off, this is called circumcision.  Why is circumcision done?  Circumcision is done for many reasons including Jew, cultural, looks, and health. Some Jew groups circumcise all boys as a desmond-based practice. Many people in the [place] choose to circumcise their baby  boys because they believe it is culturally normal. It is not a common practice in [place], [place], or [place].  Some parents choose circumcision so that their son will have a penis that looks like his father s if the father was also circumcised. Other people choose circumcision because they believe it is  or will protect the boy or man from infection or cancer later in life.  Does circumcision protect against infection or cancer?  Circumcision does seem to protect against some types of infection or cancer. Cancer of the penis is one type of cancer that circumcision may prevent. However, cancer of the penis is very rare. One hundred thousand circumcisions would need to be done to prevent one case of cancer of the penis. Circumcision may also decrease the chance of some sexually transmitted infections, such as HIV and human papilloma virus (HPV). See the next page for more information on the risks and benefits of circumcision.  What happens during a circumcision?  Babies born in the hospital are usually circumcised before they go home. Health care providers also perform circumcisions in their offices and clinics within a few weeks after birth.  Restorationist circumcisions are most often done at home or in a Zoroastrian.  Before the circumcision is performed, some providers give an injection (shot) of a small amount of anesthetic (numbing medicine) at the base of the penis to block the pain or put an anesthetic cream on the penis to numb the area that will be cut.  There are 2 different ways to do a circumcision. In one type, a clamp placed around the head of the penis cuts off the blood supply to the foreskin, and the foreskin above the clamp is cut off. The clamp is left on the penis until the area heals and it falls off a few days later. In another type of circumcision, the foreskin is cut off with scissors or a scalpel.  After the circumcision, petroleum jelly and sometimes gauze may be put over the area of the  penis where the skin was removed. This protects the end of the penis while it heals.  Can I keep my son s penis  if it is circumcised?  Regular washing with soap and water will keep any penis clean. Circumcision does not make the penis . Uncircumcised boys do need to be taught to clean beneath their foreskin, just like they need to be taught to wash their hands or brush their teeth.  How do I decide if I should have my son circumcised?  The American Academy of Pediatrics (AAP) says that  circumcision may have health benefits. They do not recommend circumcision for all boys as a routine procedure. The AAP recommends that you talk to your health care provider to decide if circumcision is the right choice for your family. You may also wish to discuss the question with your family or .  What are the risks and benefits of circumcision?  We do not have a lot of good scientific information about the health risks or benefits of circumcision.  Possible Risks:  Very few baby boys (less than 1 in 100) will have a problem after circumcision, such as bleeding or mild infection of the penis. These problems are usually not serious and are easy to treat.  Less common problems are:    Removal of too much or too little foreskin  Some rare problems are:    Narrowing of the opening of the penis, which can cause problems with urination    Removal of part of the penis or death of some of the other skin on the penis   Infection that spreads to other parts of the body  People used to think babies did not really feel pain. Now we know that they do. Many baby boys appear to feel a lot of pain during circumcision if anesthesia is not used.  We do not know if circumcision affects sexual function or sensation.  Possible Benefits:    Less risk for some kinds of cancers, like cancer of the penis    Fewer urinary tract (bladder or kidney) infections for babies    Less risk for some sexually transmitted  infections, such as HIV, herpes, and HPV     May protect female sexual partners from some sexually transmitted infections    For More Information  American Academy of Family Physicians: Circumcision  http://familydoctor.org/familydoctor/en/pregnancy-newborns/caring-for-newborns/infant- care/circumcision.html  MedlinePlus: Circumcision (includes a slide show on the procedure)  www.nlm.nih.gov/medlineplus/circumcision.html  American Academy of Pediatrics: Policy statement on circumcision  Http://pediatrics.aappublications.org/content/130/3/e756.abstract      Childbirth and Parenting Education:   [facility] center: http://nLife Therapeutics/   ([phone] 059-EBIP  [place]: (education, yoga & wellness) www.Silo [employer]  Enlightened Mama: www.enlightenedDaemonic Labs.Revert.IO   Childbirth collective: (Parent topic nights)  www.childbirthcollective.org/  Hypnobabies:  www.Cardoc.Revert.IO/  Hypnobirthing:  Http://hypnobirthing.Revert.IO/  The Birth Hour: https://Plastyc/online-childbirth-class/    APPS and Podcasts:   Ovia  Glow Nurture  The Birth Hour (for birth stories)   Birthful   Expectful   The Longest Shortest Time  PregnancyPodcast Michelle Hyde    Book Recommendations:   Makayla Xavi's Birthing From Within--first few chapters include a new-age tone, you may prefer to skip it and keep going, because there is good stuff later.  This book recommendation covers emotional preparation, but does cover coping with pain, and use of both pharmacological and nonpharmacological methods.    Dr. Garcia' The Pregnancy Book and The Birth Book--the pregnancy book goes month-by month    Womanly Art of Breastfeeding by La Leche League International   Bestfeeding by Lalita Gibbs--great pictures    Mothering Your Nursing Toddler, by Vidhi Enriquez.   Addresses dealing with so many of the challenging behaviors of a nursing toddler.  How Weaning Happens, by La Leche League.  Discusses weaning at all ages, from medically necessary  "weaning of an infant, all the way up to age 5 (or older), with why/why not, and strategies.  Very empowering book both for deciding to wean and deciding not to.    American College of Nurse-Midwives (ACNM) http://www.midwife.org/; look at the informational handouts at http://www.midwife.org/Share-With-Women     www.mymidwife.org    Mother to Baby (Medication and Herbal guidance in pregnancy): http://www.mothertobaby.org  Toll-Free Hotline: 153.580.4031  LactMed (Medication use while breastfeeding): http://toxnet.nlm.nih.gov/newtoxnet/lactmed.htm    Women's Health.gov:  http://www.womenshealth.gov/a-z-topics/index.html    American pregnancy association - http://americanpregnancy.org    Centering Pregnancy (group prenatal care option): http://centeringhealthcare.org    Information about doulas:  Childbirth collective: http://www.childbirthcollective.org/  Doulas of [place] (CATHERINE):  www.catherine.org  [facility]  project: http://twincitiesdoulaproject.com/     Early Childhood and Family Education (ECFE):  ECFE offers parents hands-on learning experiences that will nourish a lifetime of teachable moments.  http://ecfe.info/ecfe-home/    March of Dimes www.Identyx.Disruptive By Design     FDA - Nutrition  www.mypyramid.gov  Under \"For Consumers,\" click on \"pregnant and breastfeeding women.\"      Centers for Disease Control and Prevention (CDC) - Vaccines : http://www.cdc.gov/vaccines/       When researching information on the web, question the validity of websites.  The domains .gov, .edu and.org tend to be more reliable information.  If there are a lot of advertisements, be cautious of the information provided. Stay away from blogs and chat rooms please!             Virtual Breastfeeding Support:    During this time of isolation, breastfeeding families need even more community!  Here are some area organizations offering virtual support groups for breastfeeding:      [facility] Cafe Support Group, Tuesdays at 10:30 am   Run by " "DOYLE Pena of [employer]   Go to [employer] Facebook page and click on \"events\" for link   https://www.[employer].com/events/[email or web address]/    [facility] Milk Hour,  at 2:30 pm    Run by DOYLE Jacobson   Go to [facility] FB page and send message to get link   https://www.[employer]/Life in Hi-Fiundations/    [facility] holding virtual meetings the first Tuesday of each month, 8-9 pm, and the   Third Saturday, 10 - 11 am.  Go to [facility] FB page; message to get link [email or web address]://www.[employer]/LLLofGoldenValjacques/?hc_location=[facility] offers a Lactation Lounge every Friday 12pm - 1pm, run by Nilson Aden Fallon Leader   Sign up via link at [employer]/cbe-lactation   https://www.[employer]/cbe-lactation    [facility] is offering virtual support groups every Monday, 10:30 am - 12 pm, run by nurse IBCLC   Https://www.[employer].[email or web address]/events/[email or web address]/    Prenatal Breastfeeding Classes:        [facility] is offering virtual breastfeeding and  care classes:  https://www.Icarus Studios/education-workshops    BirthEd childbirth and breastfeeding education offering virtual prenatal breastfeeding classes  https://www.birthedmn.com/workshops    "

## 2021-06-17 NOTE — PROGRESS NOTES
Tami is here by herself today for her routine prenatal appt at 37weeks gestation. Feeling baby move, no cramping, change in vaginal discharge or concerns about anything. Advised to make appt in 1 weeks. Reviewed recommendation for daily iron supplementation for all pregnant women at this time, she is currently supplementing once daily and will continue; given list of options for supplementation  and comfort measures to prevent constipation. We discussed current recommendations for prenatal appointments. Reviewed changes to hospital policies regarding COVID-19 and the maternity unit. Reviewed how to reach CNM with non-urgent and urgent concerns between visits.

## 2021-06-17 NOTE — PROGRESS NOTES
"  38 week OB    S: Feels well.  Baby active.  Denies uterine cramping, vaginal bleeding or leaking of fluid. No headache, increase in edema, no epigastric pain.  Happy with her overall weight gain compared to previous pregnancies.  Desires Medela breast pump today.    O: Vitals: /64 (Patient Site: Right Arm, Patient Position: Sitting, Cuff Size: Adult Regular)   Pulse 72   Ht 5' 5.5\" (1.664 m)   Wt 214 lb (97.1 kg)   LMP 09/01/2020 (Exact Date)   Breastfeeding No   BMI 35.07 kg/m    BMI= Body mass index is 35.07 kg/m .  Exam:  Constitutional: healthy, alert and no distress  Respiratory: respirations even and unlabored  Gastrointestinal: Abdomen soft, non-tender. Fundus measures appropriate for gestational age. Fetal heart tones hear without difficulty and within normal limits  : FT-1/50/-3 soft mid  Psychiatric: mentation appears normal and affect normal/bright  A:     ICD-10-CM    1. Encounter for supervision of other normal pregnancy in third trimester  Z34.83    2. Hx of preeclampsia, prior pregnancy, currently pregnant, first trimester  O09.291    3. Excessive weight gain during pregnancy in third trimester  O26.03      P: Labor signs and symptoms discussed, aware of numbers to call  Discussed warning signs of PIH/preeclampsia and patient will monitor.  GBS negative.  Encouraged patient to call with any questions or concerns.  Return to clinic 1 weeks    Lottie Wade CNM   "

## 2021-06-17 NOTE — PATIENT INSTRUCTIONS - HE
"[facility] Nurse Midwives [facility]  Contact information:  Appointment line and to get a hold of CNM in clinic Monday-Friday 8 am - 5 pm:  [phone].  There are some clinics with early start times (1st appointment 7:40 am) and others with evening hours (last appointment 6:20 pm).  Most are typically open from 8 am to 5 pm.    CNM on call answering service: [phone].  Specify your hospital of choice and leave a brief message for CNM;  will then page CNM who is on call at your specified hospital and you should receive a call back with 15 minutes.  Be sure that your ringer is audible and that you can accept blocked calls so that we can get back in touch with you! This number should be reserved for urgent needs if during the day, before 8 am, after 5 pm, weekends, holidays.    Contact the on-call CNM with warning signs, such as:    vaginal bleeding     Vaginal discharge and itching or pain and burning during urination    Leg/calf pain or swelling on one side    severe abdominal pain    nausea and vomiting more than 4-5 times a day, or if you are unable to keep anything down    fever more than 100.4 degrees F.          Meet the Midwives from [facility]  You are invited to an informational meet and greet with [facility] Certified Nurse-Midwives. Our free \"Meet the Midwives\" event is a great opportunity to learn about our midwives' philosophy and experience, the hospitals where we can assist with your birth, and answer questions you may have. Partners, friends, and family are welcome to attend. Currently, this is a virtual event.  Date  First Tuesday of every month at 7 pm.    Link to next (live) meeting  [email or web address]-[facility]  To Join by Telephone (audio only) Call:   [phone] Phone Conference ID: [id number]          Touring the Maternity Care Center  At this time we are offering a " "virtual tour of the Maternity Care Centers at both [facility] and [facility]:   [facility]: https://www.[place].org/Locations/[facility]/Maternity-Care-Center  [facility]:   https://gamesGRABRorg/[facility]/the-birthplace/tours  https://www.[place]round.org/Locations/[facility]/Maternity-Care-Center/#virtual_tour  When in person tours become available, registrations is required. To schedule a tour at either [facility] or [facility], please do so online using the following links:  [facility] - [email or web address]://[email or web address] Systems/[email or web address]?[email or web address]&[email or web address]&[email or web address]&[email or web address]&[email or web address]&[email or web address]&it=[email or web address]&ntk=725  [facility] - [email or web address]://www.[employer]och[employer]/registerlist.[email or web address]?s=[email or web address]&[email or web address]&[email or web address]&p=[email or web address]&[email or web address]&[email or web address]&[email or web address]&it=1&ghn=595      Pre-registration for Hospital Stay:  Sometime betweeen 30-37 weeks, it is recommended that you \"pre-register\" for your upcoming hospital stay on our website:    [email or web address]    Breastfeeding and Birth Control  How do I decide what birth control method is best for me while I am breastfeeding?  Choosing a method of birth control is very personal. First, answer the following questions:      Do you want to have more children?    How much spacing between births do you want for your children?    Do you smoke or have you had any health problems, such as liver disease or a blood clot?  Talk about the answers to each of these questions with your health care provider to help you choose the best method for you.    Can I use breastfeeding as my birth control?  Using breastfeeding as your birth control (the lactational amenorrhea method) can be a good way to keep from getting pregnant in the first months after the baby is born. Each time your baby nurses, your body releases a hormone called prolactin, which stops your body from making the hormones that cause you to ovulate (release an egg). If you are not ovulating, you " cannot get pregnant.    The lactational amenorrhea method works only if:    you have not started your period yet.    you are breastfeeding only and not giving your baby any other food or drink.    you are breastfeeding at least every 4 hours during the day and every 6 hours at night.    your baby is less than 6 months old.  When any 1 of these 4 things is not happening, you no longer have good protection from getting pregnant, and you should use another form of birth control.    What birth control methods are safe for me to use while I breastfeed?    Methods without hormones  Methods without hormones do not affect you, your baby, or your breastfeeding.  Methods without hormones that are the most effective    The copper intrauterine contraceptive device (IUD) (ParaGard) is a small, T-shaped device that is in- serted into your uterus (womb) through the vagina and cervix. The copper IUD lasts for 10 years.    Sterilization (getting your tubes tied or your partner having a vasectomy) is very effective, but it is per- manent. You should choose sterilization only if you do not want to have more children.  A method without hormones that is effective    The lactational amenorrhea method described above is effective for the first 6 months.  Methods without hormones that are less effective    Natural family planning is monitoring your body for signs of ovulation and not having sex when you think you are ovulating. This method is reliable only if you are having regular periods every month.    Barrier methods (condoms, diaphragms, sponges, and spermicides) are used at the time you have sex. These methods are effective only if you use them correctly every time.    Methods with hormones  Birth control methods that use hormones can be used while you are breastfeeding. They may have a small effect on lowering the amount of milk you make. All hormones will get into your breast milk in very small amounts, but there is no known harm  to your baby from this small amount of hormone in breast milk.only methodsmethods use only 1 hormone, called progestin. You can start them right after your baby is born or wait 4 to 6 weeks to make sure your milk supply is good.     Progestin-only pills ( minipills ): If you like to take pills every day, you can use the minipill. In order forpill to work well, you have to take 1 at the same time each day. When you stop breastfeeding, you should start pills that have both estrogen and progestin because they are better at keeping you from get- ting pregnant.     Progestin IUD (Mirena): The progestin IUD is shaped and inserted into the uterus like the copper IUD. It works for up to 5 years. Both IUDs are usually inserted 4 to 6 weeks after the baby is born.    Progestin implant (Nexplanon): The progestin implant is a small matchstick-sized flexible gigi. It is placed into the fatty tissue in the back of your arm. It works for up to 3 years.    Progestin shot (Depo-Provera): The progestin shot is given every 3 months.    estrogen and progestin methods  These methods use 2 hormones, called estrogen and progestin.     These methods increase your risk of a blood clot, which is already higher than normal after you have a baby. You should not use them until your baby is at least 6 weeks old. The combined methods are not recommended as the first choice for women who are breastfeeding. If a combined method is the one that you feel will be best for you to prevent getting pregnant, these methods are okay to use while breastfeeding.     Combined birth control pills: You take a pill each day.    Vaginal ring (NuvaRing): The ring is worn in the vagina for 3 weeks then left out for 1 week before youin a new ring.    Patch (Ortho Evra): The patch is placed on your skin and changed every week for 3 weeks then left off forweek before putting a new patch on a different area of your skin.    Pediatric Care Providers at [facility]  Nurse Midwives [facility]:    Choosing the right provider is one of the most important decisions you ll make about your health care. We can help you find the right one. Remember, you re looking for a provider you can trust and work with to improve your health and well-being, so take time to think about what you need. Depending on how complicated your health care needs are, you may need to see more than one type of provider.    Primary Care Providers: You ll see a primary care provider first for most health issues. They ll work with you to get your recommended screenings, help you manage chronic conditions, and refer you to other types of providers if you need them. Your primary care provider may be called a family physician or doctor, internist, general practitioner, nurse practitioner, or physician s assistant. Your child or teenager s provider may be called a pediatrician.  Specialists: You ll see a specialist for certain services or to treat specific conditions. Specialists include cardiologists, oncologists, psychologists, allergists, podiatrists, and orthopedists. You may need a referral from your primary care provider before you go to a specialist in order for your health plan to pay for your visit.\pardHere are some tips for finding a provider where you live:  If you already have a provider you like and want to keep working with, call their office and ask if they accept your coverage.  Call your insurance company or state Medicaid and CHIP program. Look at their website or check your member handbook to find providers in your network who take your health coverage.  Ask your friends or family if they have providers they like and use these tools to compare health care providers in your area.    Family Medicine at  [facility] Nurse Midwives [facility]:     [email or web address]://[email or web address]/[email or web address]/family-medicine    Many of our families enjoy all seeing the same doctor, who comes to know the whole family very  well. We base our practice on the knowledge of the patient in the context of family and community.  WHY CHOOSE A FAMILY MEDICINE PHYSICIAN?  Ability of the whole family to see the same doctor  Focus on the whole person, including physical and emotional health  A personal relationship with their doctor that is nurtured over time  Respect for individual and family beliefs and values  No need to change primary care providers when a certain age is reached  Coordination of care when other health care services are needed    Pediatrics at  [facility]:   [email or web address]/[email or web address]-care    Through a teaching affiliation with the [facility], [facility] staff keeps current on new developments in the field of pediatrics.     Everything we do centers around caring for children. We place special emphasis on wellness and prevention.pediatric care team includes a team of pediatricians and certified nurse practitioners who provide care to pediatric and adolescent patients ages 0 to 18, and some up to the age of 26. We offer preventive health maintenance for healthy children as well the diagnosis and treatment of common and chronic illnesses and injuries. In addition, we also offer several pediatric specialists who focus on adolescent health issues and developmental and behavioral issues.    Circumcision    Educational video:     https://[email or web address].Reflexis [email or web address]    What is circumcision?  At birth, baby boys have loose skin that covers the head of the penis. This skin is called the foreskin. When all or part of the foreskin of the penis is cut off, this is called circumcision.  Why is circumcision done?  Circumcision is done for many reasons including Jew, cultural, looks, and health. Some Jew groups circumcise all boys as a desmond-based practice. Many people in the [place] choose to circumcise their baby  boys because they believe it is culturally normal. It is not a common practice in [place], [place], or [place].  Some parents choose circumcision so that their son will have a penis that looks like his father s if the father was also circumcised. Other people choose circumcision because they believe it is  or will protect the boy or man from infection or cancer later in life.  Does circumcision protect against infection or cancer?  Circumcision does seem to protect against some types of infection or cancer. Cancer of the penis is one type of cancer that circumcision may prevent. However, cancer of the penis is very rare. One hundred thousand circumcisions would need to be done to prevent one case of cancer of the penis. Circumcision may also decrease the chance of some sexually transmitted infections, such as HIV and human papilloma virus (HPV). See the next page for more information on the risks and benefits of circumcision.  What happens during a circumcision?  Babies born in the hospital are usually circumcised before they go home. Health care providers also perform circumcisions in their offices and clinics within a few weeks after birth.  Taoism circumcisions are most often done at home or in a Jehovah's witness.  Before the circumcision is performed, some providers give an injection (shot) of a small amount of anesthetic (numbing medicine) at the base of the penis to block the pain or put an anesthetic cream on the penis to numb the area that will be cut.  There are 2 different ways to do a circumcision. In one type, a clamp placed around the head of the penis cuts off the blood supply to the foreskin, and the foreskin above the clamp is cut off. The clamp is left on the penis until the area heals and it falls off a few days later. In another type of circumcision, the foreskin is cut off with scissors or a scalpel.  After the circumcision, petroleum jelly and sometimes gauze may be put over the area of the  penis where the skin was removed. This protects the end of the penis while it heals.  Can I keep my son s penis  if it is circumcised?  Regular washing with soap and water will keep any penis clean. Circumcision does not make the penis . Uncircumcised boys do need to be taught to clean beneath their foreskin, just like they need to be taught to wash their hands or brush their teeth.  How do I decide if I should have my son circumcised?  The American Academy of Pediatrics (AAP) says that  circumcision may have health benefits. They do not recommend circumcision for all boys as a routine procedure. The AAP recommends that you talk to your health care provider to decide if circumcision is the right choice for your family. You may also wish to discuss the question with your family or .  What are the risks and benefits of circumcision?  We do not have a lot of good scientific information about the health risks or benefits of circumcision.  Possible Risks:  Very few baby boys (less than 1 in 100) will have a problem after circumcision, such as bleeding or mild infection of the penis. These problems are usually not serious and are easy to treat.  Less common problems are:    Removal of too much or too little foreskin  Some rare problems are:    Narrowing of the opening of the penis, which can cause problems with urination    Removal of part of the penis or death of some of the other skin on the penis   Infection that spreads to other parts of the body  People used to think babies did not really feel pain. Now we know that they do. Many baby boys appear to feel a lot of pain during circumcision if anesthesia is not used.  We do not know if circumcision affects sexual function or sensation.  Possible Benefits:    Less risk for some kinds of cancers, like cancer of the penis    Fewer urinary tract (bladder or kidney) infections for babies    Less risk for some sexually transmitted  infections, such as HIV, herpes, and HPV     May protect female sexual partners from some sexually transmitted infections    For More Information  American Academy of Family Physicians: Circumcision  http://familydoctor.org/familydoctor/en/pregnancy-newborns/caring-for-newborns/infant- care/circumcision.html  MedlinePlus: Circumcision (includes a slide show on the procedure)  www.nlm.nih.gov/medlineplus/circumcision.html  American Academy of Pediatrics: Policy statement on circumcision  Http://pediatrics.aappublications.org/content/130/3/e756.abstract      Childbirth and Parenting Education:     Free Video Series from [facility]: https://www.nursing.[facility]/laborandbirthvideos  [facility]: http://Crux BiomedicalBlythedale Children's HospitalMojostreet/   [phone] 542-WTLR  [place]: (education, yoga & wellness) www.HighRoads  Enlightened Mama: www.RightCare Solutionsenedmama.Gigathlete   Childbirth collective: (Parent topic nights)  www.childbirthcollective.org/  Hypnobabies:  www.hypnobabiestwincities.Gigathlete/  Hypnobirthing:  Http://hypnobirthing.com/  The Birth Hour: https://Shoutitout/online-childbirth-class/    APPS and Podcasts:   Ovia  Glow Nurture  The Birth Hour (for birth stories)   Birthful   Expectful   The Longest Shortest Time  PregnancyPodcast Michelle Hyde    Book Recommendations:   Makayla Ona's Birthing From Within--first few chapters include a new-age tone, you may prefer to skip it and keep going, because there is good stuff later.  This book recommendation covers emotional preparation, but does cover coping with pain, and use of both pharmacological and nonpharmacological methods.    Dr. Garcia' The Pregnancy Book and The Birth Book--the pregnancy book goes month-by month    Womanly Art of Breastfeeding by La Leche League International   Bestfeeding by Lalita Gibbs--great pictures    Mothering Your Nursing Toddler, by Vidhi Enriquez.   Addresses dealing with so many of the challenging behaviors of a nursing  "toddler.  How Weaning Happens, by La Leche League.  Discusses weaning at all ages, from medically necessary weaning of an infant, all the way up to age 5 (or older), with why/why not, and strategies.  Very empowering book both for deciding to wean and deciding not to.    American College of Nurse-Midwives (ACNM) http://www.midwife.org/; look at the informational handouts at http://www.midwife.org/Share-With-Women     www.mymidwife.org    Mother to Baby (Medication and Herbal guidance in pregnancy): http://www.mothertobaby.org  Toll-Free Hotline: 445.183.5309  LactMed (Medication use while breastfeeding): http://toxnet.nlm.nih.gov/newtoxnet/lactmed.htm    Women's Health.gov:  http://www.womenshealth.gov/a-z-topics/index.html    American pregnancy association - http://americanpregnancy.org    Centering Pregnancy (group prenatal care option): http://centeringhealthcare.org    Information about doulas:  Childbirth collective: http://www.childbirthcollective.org/  Doulas of [place] (CATHERINE):  www.catherine.org  [facility]  project: http://twincitiesdoulaproject.com/     Early Childhood and Family Education (ECFE):  ECFE offers parents hands-on learning experiences that will nourish a lifetime of teachable moments.  http://ecfe.info/ecfe-home/    March of Dimes www.Worldrat.com     FDA - Nutrition  www.mypyramid.gov  Under \"For Consumers,\" click on \"pregnant and breastfeeding women.\"      Centers for Disease Control and Prevention (CDC) - Vaccines : http://www.cdc.gov/vaccines/       When researching information on the web, question the validity of websites.  The domains .gov, .edu and.org tend to be more reliable information.  If there are a lot of advertisements, be cautious of the information provided. Stay away from blogs and chat rooms please!             Virtual Breastfeeding Support:    During this time of isolation, breastfeeding families need even more community!  Here are some area organizations " "offering virtual support groups for breastfeeding:      [facility] Support Group,  at 10:30 am   Run by DOYLE Pena of [employer]   Go to [employer] [facility] Facebook page and click on \"events\" for link   [email or web address]://[email or web address]/    [facility] Milk Hour,  at 2:30 pm    Run by DOYLE Jacobson   Go to [facility] FB page and send message to get link   https://www.Wevod.com/Mansfield HospitalArchiturnundations/    [facility]/[place] holding virtual meetings the first Tuesday of each month, 8-9 pm, and the   Third Saturday, 10 - 11 am.  Go to [facility] and [place] FB page; message to get link [email or web address]://www.[email or web address].[employer]/LLLofGoldenValjacques/?hc_location=[facility]    Judy offers a Lactation Lounge every Friday 12pm - 1pm, run by Nilson Aden Fallon Leader   Sign up via link at Picitup/cbe-lactation   https://www.Picitup/cbe-lactation    [facility] is offering virtual support groups every Monday, 10:30 am - 12 pm, run by nurse IBCLC   Https://[email or web address]    Prenatal Breastfeeding Classes:        [employer] is offering virtual breastfeeding and  care classes:  https://www.Picitup/education-workshops    BirthEd childbirth and breastfeeding education offering virtual prenatal breastfeeding classes  https://www.birthedmn.com/workshops    "

## 2021-06-17 NOTE — PROGRESS NOTES
Assessment/Plan:     1. Routine health maintenance  Healthy female exam    2. Screening for malignant neoplasm of cervix  - Gynecologic Cytology (PAP Smear)    3. Mood swing  Discussed in length that a lot of these symptoms are normal postpartum.  It does not appear that patient is acutely depressed.  We discussed the importance of self-care, adequate sleep, and a good support system.  She may benefit from therapy.  Patient would like to trial a medication.  Prescribed sertraline, 50 mg once daily.  Discussed proper use and possible side effects of this medication.  It is safe with breast-feeding.  Patient will follow-up in 4-6 weeks.  - sertraline (ZOLOFT) 50 MG tablet; Take 1 tablet (50 mg total) by mouth daily.  Dispense: 30 tablet; Refill: 1    Subjective:   Tami Malik  is a 23 y.o. female who presents for an annual exam.  Patient is approximately 6 months postpartum with her first child; a daughter.  Patient works full-time in the mental health field.  Patient is due for a Pap; last Pap showed LGSIL.  She is breast-feeding and on Depo-Provera for birth control.  Patient is not planning on continuing on Depo-Provera.  She has been having trouble losing weight since being on this.    Patient has some concerns regarding her mood.  No history of depression or anxiety.  She does not feel acutely depressed, but her moods are very labile.  States 1 minute she will be feeling happy and content, then the next minute she will be very angry at her .  She is having a hard time controlling her emotions.  Her daughter makes her feel incredibly happy.  She denies any suicidal thoughts or ideations.  Patient tells me her mom had similar symptoms after she had children, and was started on fluoxetine.      Healthy Habits:   Regular Exercise: yes, walks and weight training  Sunscreen Use: yes  Healthy Diet: yes  Dental Visits Regularly: no  Seat Belt: yes  Sexually active: yes  Self Breast Exam Monthly:  yes  Hemoccults: na  Flex Sig: na  Colonoscopy: na  Lipid Profile: na  Glucose Screen: 2017   Prevention of Osteoporosis: na  Last Dexa: na    Immunization History   Administered Date(s) Administered     MMR 2005     Td, Adult, Absorbed 2005     Tdap 2015, 2017       Immunization status: up to date and documented. Declines HPV vaccinations.    Gynecologic History  LMP: recent pregnancy  Contraception: depo   Last Pap: 2017 Results were: abnormal  Last mammogram: na Results were: na    OB History      Para Term  AB Living    1 1 1   1    SAB TAB Ectopic Multiple Live Births       0 1          Current Outpatient Prescriptions   Medication Sig Dispense Refill     OMEGA-3/DHA/EPA/FISH OIL (FISH OIL-OMEGA-3 FATTY ACIDS) 300-1,000 mg capsule Take 1 g by mouth daily.       prenat.vits,nafisa,min-iron-folic (PRENATAL VITAMIN) Tab Take 1 tablet by mouth daily.       sertraline (ZOLOFT) 50 MG tablet Take 1 tablet (50 mg total) by mouth daily. 30 tablet 1     Current Facility-Administered Medications   Medication Dose Route Frequency Provider Last Rate Last Dose     medroxyPROGESTERone injection 150 mg (DEPO-PROVERA)  150 mg Intramuscular Q3 Months Lottie Wade, MARNIE,CNM   150 mg at 18 1708       Past Medical History:   Diagnosis Date     Abnormal Pap smear of cervix      Allergic     seasonal     Chlamydia     5 years ago     Urinary tract infection     x1     Varicella     as child     Past Surgical History:   Procedure Laterality Date     MOUTH SURGERY      had 4 molars removed     WISDOM TOOTH EXTRACTION      + 4 other molars      No Known Allergies  Family History   Problem Relation Age of Onset     Asthma Mother      Drug abuse Father      No Medical Problems Sister      Mental illness Brother      No Medical Problems Maternal Grandmother      Hypertension Maternal Grandfather      Social History     Social History     Marital status:      Spouse name: N/A      "Number of children: N/A     Years of education: N/A     Occupational History     Not on file.     Social History Main Topics     Smoking status: Never Smoker     Smokeless tobacco: Never Used     Alcohol use No     Drug use: No      Comment: Quit 3 years ago- MDMA, Marijuana     Sexual activity: Yes     Partners: Male     Birth control/ protection: Injection     Other Topics Concern     Not on file     Social History Narrative        Review of Systems  Fourteen point review of systems negative except as mentioned in HPI    Objective:      Vitals:    05/08/18 1606   BP: 110/66   Patient Site: Right Arm   Patient Position: Sitting   Cuff Size: Adult Regular   Pulse: 80   Weight: 180 lb 12.8 oz (82 kg)   Height: 5' 4.5\" (1.638 m)     Body mass index is 30.55 kg/(m^2).    Physical Exam:  General Appearance: Alert, cooperative, no distress, appears stated age  Head: Normocephalic, without obvious abnormality, atraumatic  Eyes: PERRL, conjunctiva/corneas clear, EOM's intact. Wearing corrective lenses.  Ears: Normal TM's and external ear canals, both ears  Throat: Lips, mucosa, and tongue normal; teeth and gums normal  Neck: Supple, symmetrical, trachea midline, no adenopathy;  thyroid: not enlarged, symmetric, no tenderness/mass/nodules  Lungs: Clear to auscultation bilaterally, respirations unlabored  Breasts: No breast masses, tenderness, asymmetry, or nipple discharge.  Heart: Regular rate and rhythm, S1 and S2 normal, no murmur, rub, or gallop  Abdomen: Soft, non-tender, bowel sounds active all four quadrants,  no masses, no organomegaly  Pelvic:Normally developed genitalia with no external lesions or eruptions. Vagina and cervix show no lesions, inflammation, discharge or tenderness. No cystocele, No rectocele. No adnexal mass or tenderness.  Extremities: Extremities normal, atraumatic, no cyanosis or edema  Skin: Skin color, texture, turgor normal, no rashes or lesions  Lymph nodes: Cervical, supraclavicular, and " axillary nodes normal  Neurologic: Normal   Psychologic: appropriate affective, answers all of my questions appropriately. No hallucinations, delusion, or suicidal ideations.    PHQ-9 Total Score: 10 (5/8/2018  4:00 PM)  JULIANO-7 Total: 11 (5/8/2018  4:00 PM)      JEAN CARLOS Zaldivar

## 2021-06-18 NOTE — PATIENT INSTRUCTIONS - HE
Patient Instructions by Katerine Feliz NP at 2/21/2020 10:00 AM     Author: Katerine Feliz NP Service: -- Author Type: Nurse Practitioner    Filed: 2/21/2020 10:05 AM Encounter Date: 2/21/2020 Status: Signed    : Katerine Feliz NP (Nurse Practitioner)       Patient Education     Prevention Guidelines, Women Ages 18 to 39  Screening tests and vaccines are an important part of managing your health. A screening test is done to find possible disorders or diseases in people who don't have any symptoms. The goal is to find a disease early so lifestyle changes can be made and you can be watched more closely to reduce the risk of disease, or to detect it early enough to treat it most effectively. Screening tests are not considered diagnostic, but are used to determine if more testing is needed. Health counseling is essential, too. Below are guidelines for these, for women ages 18 to 39. Talk with your healthcare provider to make sure youre up-to-date on what you need.  Screening Who needs it How often   Alcohol misuse All women in this age group At routine exams   Blood pressure All women in this age group Yearly checkup if your blood pressure is normal  Normal blood pressure is less than 120/80 mm Hg  If your blood pressure reading is higher than normal, follow the advice of your healthcare provider   Breast cancer All women in this age group should talk with their healthcare providers about the need for clinical breast exams (CBE)1 Clinical breast exam every 3 years1   Cervical cancer Women ages 21 and older Women between ages 21 and 29 should have a Pap test every 3 years; women between ages 30 and 65 are advised to have a Pap test plus an HPV test every 5 years   Chlamydia Sexually active women ages 25 and younger, and women at increased risk for infection (such as having multiple sex partners) Every year if you're at risk or have symptoms   Depression All women in this age group At routine exams   Type 2  diabetes, prediabetes All women with no symptoms who are overweight or obese and have 1 or more other risk factors for diabetes At least every 3 years. Also, testing for diabetes during pregnancy after the 24th week.    Type 2 diabetes, prediabetes All women diagnosed with gestational diabetes Lifelong testing every 3 years   Type 2 diabetes All women with prediabetes Every year   Gonorrhea Sexually active women at increased risk for infection At routine exams   Hepatitis C Anyone at increased risk At routine exams   HIV All women should be tested at least once for HIV between the ages of 13 and 64 At routine exams. Those with risk factors for HIV should be tested at least annually.    Obesity All women in this age group At routine exams   Syphilis Women at increased risk for infection should talk with their healthcare provider At routine exams   Tuberculosis Women at increased risk for infection should talk with their healthcare provider Ask your healthcare provider   Vision All women in this age group At least 1 complete exam in your 20s, and 2 in your 30s   Vaccine2 Who needs it How often   Chickenpox (varicella) All women in this age group who have no record of this infection or vaccine 2 doses; the second dose should be given 4 to 8 weeks after the first dose   Hepatitis A Women at increased risk for infection should talk with their healthcare provider 2 doses given at least 6 months apart   Hepatitis B Women at increased risk for infection should talk with their healthcare provider 3 doses over 6 months; second dose should be given 1 month after the first dose; the third dose should be given at least 2 months after the second dose and at least 4 months after the first dose   Haemophilus influenzae Type B (HIB) Women at increased risk for infection should talk with their healthcare provider 1 to 3 doses   Human papillomavirus (HPV) All women in this age group up to age 26 3 doses; the second dose should be  given 1 to 2 months after the first dose and the third dose given 6 months after the first dose   Influenza (flu) All women in this age group Once a year   Measles, mumps, rubella (MMR) All women in this age group who have no record of these infections or vaccines 1 or 2 doses   Meningococcal Women at increased risk for infection should talk with their healthcare provider 1 or more doses   Pneumococcal conjugate vaccine (PCV13) and pneumococcal polysaccharide vaccine (PPSV23) Women at increased risk for infection should talk with their healthcare provider PCV13: 1 dose ages 19 to 65 (protects against 13 types of pneumococcal bacteria)  PPSV23: 1 to 2 doses through age 64, or 1 dose at 65 or older (protects against 23 types of pneumococcal bacteria)      Tetanus/diphtheria/pertussis (Td/Tdap) booster All women in this age group Td every 10 years, or a one-time dose of Tdap instead of a Td booster after age 18, then Td every 10 years   Counseling Who needs it How often   BRCA gene mutation testing for breast and ovarian cancer susceptibility Women with increased risk for having gene mutation When your risk is known   Breast cancer and chemoprevention Women at high risk for breast cancer When your risk is known   Diet and exercise Women who are overweight or obese When diagnosed, and then at routine exams   Domestic violence Women at the age in which they are able to have children At routine exams   Sexually transmitted infection prevention Women who are sexually active At routine exams   Skin cancer Prevention of skin cancer in fair-skinned adults At routine exams   Use of tobacco and the health effects it can cause All women in this age group Every visit   1 According to the ACS, women ages 20 to 39 years should have a clinical breast exam (CBE) as part of their routine health exam every 3 years. Breast self-exams are an option for women starting in their 20s. But the USPSTF does not recommend CBE.  Date Last  Reviewed: 10/1/2017    0925-2591 [employer]. [address] [place] [address], [place] [address]. All rights reserved. This information is not intended as a substitute for professional medical care. Always follow your healthcare professional's instructions.

## 2021-06-18 NOTE — PROGRESS NOTES
Assessment/Plan:     1. Mood swing  Mood lability has greatly improved with the sertraline.  Patient is tolerating the medication well without any side effects.  Will continue medication at current dose.  Refills placed.  Follow-up in 1 year or sooner with any significant mood changes.  - sertraline (ZOLOFT) 50 MG tablet; Take 1 tablet (50 mg total) by mouth daily.  Dispense: 90 tablet; Refill: 3        Subjective:     Tami Malik is a 23 y.o. female who presents for follow-up regarding medication.  Patient was started on sertraline about 1 month ago due to mood changes.  Patient was experiencing a difficult time controlling her emotions.  She would feel happy and content 1 minute, but sad or angry the next.  Patient states that the lability in her moods have greatly improved.  She continues to have some highs and lows, but much less significant.  She feels like she has more energy and handling stress/anxiety much better.  Patient's  has noticed a positive change in her.  Patient denies any noticeable side effects of the medication.  She is sleeping well at night.  Not experiencing any significant fatigue.  Overall, patient is happy with the medication and would like to continue.      The following portions of the patient's history were reviewed and updated as appropriate: allergies, current medications, past family history, past medical history, past social history, past surgical history and problem list.    Review of Systems  A comprehensive review of systems was performed and was otherwise negative    Objective:     /74  Pulse 72  Wt 178 lb 1.6 oz (80.8 kg)  Breastfeeding? Yes  BMI 30.1 kg/m2    General Appearance: Alert, cooperative, no distress, appears stated age  Psychologic: appropriate affective, answers all of my questions appropriately. No hallucinations, delusion, or suicidal ideations.    PHQ-9 Total Score: 6 (6/12/2018  4:00 PM)      JEAN CARLOS Zaldivar

## 2021-06-20 NOTE — PROGRESS NOTES
Assessment/Plan:     1. Viral pharyngitis  Rapid strep negative.  Symptoms likely viral.  Recommend rest and plenty of fluids.  Tylenol and/or ibuprofen as needed for pain.  Will notify if culture is positive.    2. Sore throat  - Rapid Strep A Screen- Throat Swab  - Group A Strep, RNA Direct Detection, Throat        Subjective:     Tami Malik is a 23 y.o. female who presents with complaints of a sore throat times 1 week.  Symptoms worsened last night.  Patient reports pain with swallowing and some white exudate on her tonsils.  Denies any fever, sinus congestion, ear pain, or cough.  She did have a fever about 1 week ago, but this preceded her sore throat symptoms.  Patient is drinking plenty of tea, otherwise no other treatments tried.  No known strep contacts.      The following portions of the patient's history were reviewed and updated as appropriate: allergies, current medications, past family history, past medical history, past social history, past surgical history and problem list.    Review of Systems  A comprehensive review of systems was performed and was otherwise negative    Objective:     /70 (Patient Site: Right Arm, Patient Position: Sitting, Cuff Size: Adult Regular)  Pulse 70  Temp 99.1  F (37.3  C) (Oral)   Wt 184 lb 4.8 oz (83.6 kg)  LMP 09/08/2018 (Approximate)  BMI 31.15 kg/m2    General Appearance: Alert, cooperative, no distress, appears stated age  Ears: Normal TM's and external ear canals, both ears  Nose: Nares normal, septum midline,mucosa normal, no drainage  Throat: Lips, mucosa, and tongue normal; teeth and gums normal. Slight erythema to posterior pharynx. No tonsillary hypertrophy or exudate.   Neck: Supple, symmetrical, trachea midline, no adenopathy  Lungs: Clear to auscultation bilaterally, respirations unlabored  Heart: Regular rate and rhythm, S1 and S2 normal, no murmur, rub, or gallop    Katerine Feliz, NP-C

## 2021-06-23 NOTE — PROGRESS NOTES
PRENATAL VISIT   FIRST OBSTETRICAL EXAM - OB    Assessment / Impression     First prenatal visit at 10w1d    Hx ASCUS with + HPV, negative colposcopy  Hx preeclampsia  Hx postpartum hemorrhage    Plan:       -IOB labs drawn. Declines GC/CT, pap smear today  -Pt is low risk and declines lead level screening today.  -Patient is interested in waterbirth.  Hep C was drawn today.  -Recommend low dose aspirin 81mg daily beginning at 13 weeks gestation  -Reviewed prenatal care schedule  -Optimal nutrition and weight gain discussed. Pregnancy weight gain of 11-20 lbs (BMI 30.0 or greater) encouraged.   -Anticipatory guidance for common pregnancy questions and concerns reviewed.   -Danger s/sx for this trimester reviewed with patient. And contact information given.  -Reviewed genetic screening options with patient, patient will check with insurance and notify us if they desire first trimester or NIPT screening.  -Ordered early US    -IOB packet given and reviewed with patient.  -CNM services and hospital options reviewed; emergency and scheduling phone numbers given to patient.  -Return to clinic 4 weeks    Total time spent with patient: 40 minutes, >50% time spent counseling and coordinating care.    Subjective:    Tami Malik is a 23 y.o.  here today for her First Obstetrical Exam.  This was not a planned pregnancy, however they had been discussing another baby so are happy about the pregnancy.  She had been on depo provera for 3 injections worth then stopped using BCM.  Patient's last menstrual period was 2018 (exact date).; Menses cycle typically 27 days, lasting 4-5 days, and light flow. LMP was normal, however she only has had two cycles since stopping her depo provera.  She has had nausea and vomiting of pregnancy 1-4x/day and is nauseated most of the day.  Took zofran with good success with last pregnancy.  We discussed most recent studies do not show cardiac defects a/w zofran use in early pregnancy.   Desires RX for that today.  Has been trying small frequent meals, lemon, johnie, tea.  Has not tried vitamin B6/unisom.  She has a hx of preE and PPH.  Had IOL due to preE with first.  Used NO for pain relief.  When feeling well eats a lot of fruits/vegetables, meat.  Exercises 3x/week though less with illness.  We discussed hx of preE and recommendation for low dose aspirin 81mg daily starting at 13 weeks to help reduce reoccurrence.  Discussed she is at a higher risk for preE and PPH with this pregnancy but most do not experience reoccurrence.        Education level: college grad  Occupation: mental health advocate  Partners name: Donovan  Partners occupation: property maintenance FT    OB History    Para Term  AB Living   2 1 1     1   SAB TAB Ectopic Multiple Live Births         0 1      # Outcome Date GA Lbr Stanislaw/2nd Weight Sex Delivery Anes PTL Lv   2 Current            1 Term 17 38w2d 01:38 / 00:54 6 lb (2.722 kg) F Vag-Spont Inhalation, Local N IVAN      Complications: Other Excessive Bleeding,Preeclampsia      Birth Comments: IOL for preE        Pap smear: 2018 ASCUS with + HPV, colposcopy 2018 negative at [facility], recommended repeat testing in 6mo  Expected Date of Delivery: 2019, by Last Menstrual Period    Past Medical History:   Diagnosis Date     Abnormal Pap smear of cervix     2018     Allergic     seasonal     Chlamydia     5 years ago     Hypertension     preeclampsia first pregnancy     Hypertension 2017    preE     Urinary tract infection     x1     Varicella     as child     Past Surgical History:   Procedure Laterality Date     COLPOSCOPY      2018 neg     MOUTH SURGERY      had 4 molars removed     WISDOM TOOTH EXTRACTION      + 4 other molars     Social History     Tobacco Use     Smoking status: Never Smoker     Smokeless tobacco: Never Used   Substance Use Topics     Alcohol use: No     Drug use: No     Comment: Quit 3 years ago- MDMA, Marijuana     Current  "Outpatient Medications   Medication Sig Dispense Refill     OMEGA-3/DHA/EPA/FISH OIL (FISH OIL-OMEGA-3 FATTY ACIDS) 300-1,000 mg capsule Take 1 g by mouth daily.       prenat.vits,nafisa,min-iron-folic (PRENATAL VITAMIN) Tab Take 1 tablet by mouth daily.       ondansetron (ZOFRAN ODT) 4 MG disintegrating tablet Take 1 tablet (4 mg total) by mouth every 8 (eight) hours as needed for nausea. 30 tablet 1     sertraline (ZOLOFT) 50 MG tablet Take 1 tablet (50 mg total) by mouth daily. 90 tablet 3     No current facility-administered medications for this visit.      No Known Allergies       Pregnancy Risk Factors: Hx preE and PPH, obesity    Review of Systems  General:  Denies problem  Eyes: Denies problem  Ears/Nose/Throat: Denies problem  Cardiovascular: Denies problem  Respiratory:  Denies problem  Gastrointestinal:  Nausea and vomiting of pregnancy  Genitourinary: Denies problem  Musculoskeletal:  Denies problem  Skin: Denies problem  Neurologic: Denies problem  Psychiatric: Denies problem  Endocrine: Denies problem  Heme/Lymphatic: Denies problem   Allergic/Immunologic: Denies problem       Objective:   Objective    Vitals:    01/23/19 1703   BP: 112/68   Pulse: 84   Weight: 173 lb 1.6 oz (78.5 kg)   Height: 5' 4.5\" (1.638 m)     Physical Exam:  General Appearance: Alert, cooperative, no distress, appears stated age  Head: Normocephalic, without obvious abnormality, atraumatic  Eyes: Conjunctiva/corneas clear, does wear corrective lenses  Neck: Supple, symmetrical, trachea midline, no adenopathy  Thyroid: not enlarged, symmetric, no tenderness/mass/nodules  Back: Symmetric, no curvature, ROM normal, no CVA tenderness  Lungs: Clear to auscultation bilaterally, respirations unlabored  Heart: Regular rate and rhythm, S1 and S2 normal, no murmur, rub, or gallop  Breasts: Declines exam today  Abdomen: Soft, non-tender, no masses.   FHT: not assessed, going down for US after visit   Vulva:  no sign of lesions or condyloma, " normal hair distribution  Vagina: pink, normal rugae, no abnormal discharge  Cervix:  long/thick/closed, no lesions or inflammation noted, negative CMT with exam, mild ectropion noted  Uterus: mid position, non tender  Adnexa: no masses appreciated, non-tender with introduction of speculum     Extremities: Extremities normal, atraumatic, no cyanosis or edema  Skin: Skin color, texture, turgor normal, no rashes or lesions, tattoo on left top of foot and ankle and right forearm.  Several moles on back without signs of malignancy.  Lymph nodes: Cervical, supraclavicular, and axillary nodes normal  Neurologic: Alert and oriented x 3.    Lab:   Results for orders placed or performed in visit on 01/23/19   HM1 (CBC with Diff)   Result Value Ref Range    WBC 9.5 4.0 - 11.0 thou/uL    RBC 4.46 3.80 - 5.40 mill/uL    Hemoglobin 13.4 12.0 - 16.0 g/dL    Hematocrit 39.1 35.0 - 47.0 %    MCV 88 80 - 100 fL    MCH 30.0 27.0 - 34.0 pg    MCHC 34.3 32.0 - 36.0 g/dL    RDW 13.2 11.0 - 14.5 %    Platelets 315 140 - 440 thou/uL    MPV 10.1 8.5 - 12.5 fL    Neutrophils % 72 (H) 50 - 70 %    Lymphocytes % 22 20 - 40 %    Monocytes % 6 2 - 10 %    Eosinophils % 1 0 - 6 %    Basophils % 0 0 - 2 %    Neutrophils Absolute 6.8 2.0 - 7.7 thou/uL    Lymphocytes Absolute 2.1 0.8 - 4.4 thou/uL    Monocytes Absolute 0.6 0.0 - 0.9 thou/uL    Eosinophils Absolute 0.1 0.0 - 0.4 thou/uL    Basophils Absolute 0.0 0.0 - 0.2 thou/uL

## 2021-06-23 NOTE — PATIENT INSTRUCTIONS - HE
Welcome to [facility] and thank you for choosing us for your maternity care provider!  Congratulations!    [facility] Nurse Midwives - Contact information:  Appointment line and to get a hold of CNM in clinic Monday-Friday 8 am - 5 pm:  [phone].  There are some clinics with early start times (1st appointment 7:40 am) and others with evening hours (last appointment 6:20 pm).  Most are typically open from 8 am to 5 pm.    CNM on call answering service: [phone].  Specify your hospital of choice and leave a brief message for CNM;  will then page CNM who is on call at your specified hospital and you should receive a call back with 15 minutes.  Be sure that your ringer is audible and that you can accept blocked calls so that we can get back in touch with you! This number should be reserved for urgent needs if during the day, before 8 am, after 5 pm, weekends, holidays.      Pregnancy: Body Changes  From conception (fertilization) until after the birth of your child, you and your baby will change every day. To help you understand what is happening, we ve outlined how pregnancy begins and some of the changes you may notice.  How Pregnancy Begins  Conception is the union of a sperm and an egg. When it occurs, your baby s genetic makeup is complete, even its sex. Fertilization takes place in the fallopian tube. The fertilized egg then travels down this tube to the uterus (womb). The egg attaches to the lining of the uterus about a week later. There it grows and is nourished.    Your Changing Body  Pregnancy affects almost every part of your body. You may notice some of the following physical and emotional changes:    Your uterus expands outward and upward as your baby grows. You may feel pressure on your bladder, stomach, and other organs.    You may notice skin color changes on your forehead, nose, and cheeks. A dark line may form from your bellybutton down to your pubic area. The skin color around your  nipples and thighs may also change.    Pink stretch marks may appear on your abdomen, breasts, or hips.    Your hair may seem thicker. You lose less hair during pregnancy.    You may feel fine one day and weepy the next. This is caused by changes in your body, such as increased hormones (chemicals that affect the function of certain organs and also your moods).      Adapting to Pregnancy: First Trimester  As your body adjusts, you may have to change or limit your daily activities. You ll need more rest. You may also need to use the energy you have more wisely.  Eat stomach-friendly foods like cottage cheese, crackers, or bread throughout the day.    Your Changing Body  Almost every part of your body is affected as you adapt to pregnancy. The uterus and cervix will begin to soften right away. You may not look very pregnant during the first three months. But you are likely to have some common signs of early pregnancy:    Nausea    Fatigue    Frequent urination    Mood swings    Bloating of the abdomen    Missed or light periods (first trimester bleeding)    Nipple or breast tenderness, breast swelling      It s Not Too Late to Start Good Habits  What matters most is protecting your baby from this moment on. If you smoke, drink alcohol, or use drugs, now is the time to stop. If you need help, talk with your health care provider.    Smoking increases the risk of stillbirth or having a low-birth-weight baby. If you smoke, quit now.    Alcohol and drugs have been linked with miscarriage, birth defects, intellectual disability, and low birth weight. Do not drink alcohol or take drugs.    Tips to Relieve Nausea  Although nausea can occur at any time of the day, it may be worse in the morning. To help prevent nausea:    Eat small, light meals at frequent intervals.    Get up slowly. Eat a few unsalted crackers before you get out of bed.    Drink water with lemon slices.    Eat an ice pop in your favorite flavor.    Ask your  health care provider about taking johnie or vitamin B6 for nausea and vomiting.    Talk with your health care provider if you take vitamins that upset your stomach.    Work Concerns  The end of the first trimester is a good time to discuss working during pregnancy with your employer. Follow your health care provider s advice if your job requires you to stand for a long time, work with hazardous tools, or even sit at a desk all day. Your workspace, workload, or scheduled hours may need to be adjusted. Perhaps you can change body postures more often or take an extra break.  Advice for Travel  Talk to your health care provider first, but the second trimester may be the best time for any travel. You may be advised to avoid certain trips while you re pregnant. Food and water can be concerns in developing countries. Travel by car is a good choice, as you can stop, get out, and stretch. Bring snacks and water along. Fasten the lap belt below your belly, low over your hips. Also be sure to wear the shoulder harness.  Intimacy  Unless your health care provider tells you to, there is no reason to stop having sex while you re pregnant. You or your partner may notice changes in desire. Desire may be less in the first trimester, due to nausea and fatigue. In the second trimester, sex may be very enjoyable. The third trimester can be a challenge comfort-wise. Try different positions and see what s best for you both.      Pregnancy: Your First Trimester Changes  The first trimester is a time of rapid development for your baby. Because your baby is growing so quickly, it is important that you start a healthy lifestyle right away. By the end of the first trimester, your baby has formed all of its major body organs and weighs just over an ounce.    Month 1 (Weeks 1-4)  The placenta (the organ that nourishes your baby) begins to form. The heart and lungs begin to develop. Your baby is about 1/4 inch long by the end of the first  month.    Month 2 (Weeks 5-8)  All of your baby s major body organs form. The face, fingers, toes, ears, and eyes appear. By the end of the month, your baby is about 1 inch long.    Month 3 (Weeks 9-12)  Your baby can open and close its fists and mouth. The sexual organs begin to form. As the first trimester ends, your baby is about 4 inches long.      Pregnancy: Your Weight  Being a healthy weight is important for both you and your baby. The weight you gain now is not just extra fat. It is also the weight of your baby. And it is the increased blood and fluids to support the baby. A slow, steady rate of gain is best. How much you should gain depends on your weight before getting pregnant. Check with your health care provider to find out what is right for you.    If You Gain Too Much  Gaining too much weight might cause you to feel tired or you could have a harder pregnancy or birth. If you and your health care provider decide you re gaining too much:    Eat fewer fats and sugars. Instead, eat fruit, vegetables, and whole-grain foods.    Drink plenty of water between meals.    Get at least 20 minutes of light exercise, such as walking, each day.    Don t diet. You might not get enough of the nutrients you or your baby needs.    Keep a diet diary to help you gauge what and how much you are eating .    If You re Not Gaining Enough  If you don t gain enough, your baby could be too small or have health problems. Women tend to gain most of their weight in the second and third trimesters. For now:    Eat many types of foods. Make sure you get enough calcium, protein, and carbohydrates.    Don t skip meals.    Eat healthy snacks.    Pick nutrient-dense, high calorie healthy food like trail mix or protein shakes.    See a dietitian for help.    Talk to your healthcare provider if you have had an eating disorder or problems with certain foods.      Pregnancy: Common Questions  There are plenty of myths and  old wives  tales   surrounding pregnancy. You may need help  fact from fiction. On this sheet, you ll find answers to a few common questions. If you have other questions, talk with a midwife.    Will Working Harm My Baby?  In most cases, working throughout your pregnancy is not harmful at all. There may be concerns if the job involves dangerous machinery or chemicals, lifting, or standing for very long periods of time. Talk to your health care provider and employer about your particular job and pregnancy.  Why Can t I Change the Cat Litter Box?  Cats carry a disease called toxoplasmosis. In adult humans, it shows up as a mild infection of the blood and organs. If you are infected during pregnancy, the baby s brain and eyes could be damaged. To be safe, have someone else change the litter. If you must handle it, wear a paper mask over your nose and mouth. Also, wear gloves and wash your hands afterward.  Which Medications Are Safe?  No prescription or over-the-counter drug is safe for everyone all of the time. But sometimes medications are needed. Be sure your health care provider knows you are pregnant. Then use only the medications he or she advises you to take. Please refer to the below resources for further information and discuss concern and questions with your midwife.  Is It True That I Can Overheat My Baby?  Yes. To avoid making your baby too warm:    Don t sit in a jacuzzi. A long, warm bath is fine, but not in water over 100 F.    Exercise less intensely if you feel fatigued. Base your workout on how you feel, not your heart rate. Heart rates aren t a good way to measure effort during pregnancy.  Can I Lift and Carry Safely?  Yes, if your health care provider doesn t tell you otherwise. Learn to lift and carry safely to avoid injury and reduce back pain during pregnancy. To protect your back:    Bend at the knees to bring the load nearer.    Get a good . Test the weight of the load.    Tighten your abdomen.  Exhale as you lift.    Lift with your legs, not with your back.    Carry the load close to your body.    Hold the load so you can see where you are going.  What If I Get Sick?  Most women get sick at least once during pregnancy. Talk with your health care provider if you do. Most likely it will not affect your pregnancy. Get plenty of rest and fluids, and eat what you can. Talk to your health care provider before taking any medications.        HEALTHY PREGNANCY CARE: 10-14 WEEKS PREGNANT     By weeks 10 to 14 of your pregnancy, the placenta has formed inside your uterus. It may be possible to hear your baby's heartbeat with a doppler ultrasound device. Your baby's eyes can and do move. The arms and legs can bend.    GENETIC SCREENING OPTIONS AT [facility]                All testing is optional. We don t recommend or discourage any test; it is totally up to you and your partner. Some couples wish to know their risk of having a baby with a genetic defect and others do not. We will support your decision. Abnormal results may lead to a discussion of options for further testing.    Accurate dating of your pregnancy is important for all testing so an ultrasound may be done prior to referral or testing.    No testing provides certainty; there are false positives and negatives associated with all testing, some more than others.    Most genetic testing is non-invasive (requires only a blood sample and sometimes an ultrasound or both).    It is always wise to check with your insurance carrier before proceeding.    Some testing can be done at our lab and some require a referral.    If you decide to do no testing, the 20 week ultrasound scan, which is a routine or standard ultrasound, has some ability to detect abnormalities in the baby, and identifies obstetric problems.    If you are over 35, you will have the option of a Level II ultrasound, which is a more detailed and targeting scan, that helps detect fetal anomalies as  well as obstetric problems.      If you have a more complex family history of chromosomal abnormalities, a referral to a genetic counselor and/or a Maternal Fetal Medicine specialist, to help identify available tests, may be recommended.    TYPES OF GENETIC TESTING AVAILABLE INCLUDE:    Carrier Screening/Testing for Genetic Conditions    There are many inherited conditions for which testing or carrier testing is available. Carrier screening can test for conditions like Cystic Fibrosis, Thalassemia, Roland Sachs, Sickle Cell, Hemophilia, Muscular Dystrophy, Seth s disease and many others.     Cystic Fibrosis (CF) affects both males and females and people from all racial and ethnic groups. However, the disease is most common among Caucasians of Northern  descent. CF is also common among Latinos and American Indians. The disease is less common among  Americans and  Americans. More than 10 million Americans are carriers of a faulty CF gene and many of them don't know that they are CF carriers. One or both parents can be tested any time before or during pregnancy.    Talk to your care provider about your family history and whether you should be screened. A referral to a genetic counselor at [facility] or [facility] can be made by your care provider at any time.    This is also tested for in the [place] Metabolic Screen that your infant receives 24 hours after birth.     Fetal DNA cell Testing: [place] or [facility] (Non-Invasive Prenatal Testing)    At 10 wk or greater, a blood sample can be drawn here at clinic or at any of our referral offices. It will provide highly accurate results with low false positive rates for trisomy 18, trisomy 21 (Down Syndrome), and trisomy 13 (> 99% trisomy detection rate at a false positive rate of <0.1%). Gender identification can also be obtained if desired (98% accuracy).  Can also detect some sex-linked chromosomal abnormalities (80-90% accuracy).   This is often done if one of the other screening tests is abnormal.        1st Trimester Screening:     Everyone has an age related risk of having a baby with a genetic abnormality. This testing provides a risk profile which is better than assigning risk based solely on your age.    Refines your risk of having a baby with a chromosomal abnormality such as Trisomy 21 & 18.    This test with detect a fetus with one of these disorders about 85% of the time.    A thickened nuchal fold can also be associated with cardiac defects.    This test requires a blood collection combined with ultrasound to obtain a measurement of fluid at back of baby s neck (nuchal translucency).    False positive results occur approximately 5% of cases.    An additional blood sample may be necessary in order to calculate your risk of having a baby with a neural tube defect (e.g. spina bifida).  This is called the AFP test (alpha fetal protein).  An ultrasound should be able to  any spinal defect as well.    Follow-up of an abnormal test may include a more extensive ultrasound study and you may be offered an amniocentesis (a small sample of amniotic fluid is withdrawn and studied) for a definitive diagnosis    Quad Screen (4-marker screen)    Between 15 and 21 weeks, a sample of blood can be drawn at our lab to assess your risk of having a baby with Down Syndrome, Trisomy 18 and neural tube defects. Such testing is able to detect these conditions in 80% of cases and the false-positive rate is approximately 5%.     Follow-up of an abnormal test may include a more extensive ultrasound study and you may be offered an amniocentesis (a small sample of amniotic fluid is withdrawn and studied) for a definitive diagnosis      Referrals opportunities include:    [facility],  [facility]  o Offers nuchal translucency ultrasound; does not offer genetic counseling.    [place]  [facility] and [facility]  [place] ([facility]):   o Approximately an hour long visit includes 30 min with genetic counselor who discusses all testing available and which ones might be beneficial to you based on age, personal and family history.    o Blood will be drawn and the nuchal translucency ultrasound will be discussed and performed if desired. The Free Fetal DNA testing ([place]/[employer]) can be drawn also.  o Targeted or detailed Level II ultrasounds are also available with these perinatology groups.        Breastfeeding: a Healthy Option for You and Your Baby  Consider breastfeeding for the healthiest way to feed your baby. Ask your midwife or physician for more information.     The choice of how you will feed your baby is important.  Before your baby s birth, you ll want to learn about the benefits of breastfeeding.  [facility] have been designated Baby Friendly; an initiative that was created by the World Health Organization and UNICEF.  This helps give you and your baby the best start in feeding their baby.    Why should I breastfeed my baby?    Babies are less likely to develop childhood obesity or diabetes    Babies are less likely to suffer from recurrent ear infections    Babies are less likely to be hospitalized for respiratory conditions    Breast milk is rich in nutrients and antibodies-it is easy to digest    How does it benefit me?    Lowers the risk for diabetes, breast and ovarian cancer and postpartum depression    Moms can lose  baby weight  more quickly    Cost savings - formula can cost well over $1,500 per year    Convenient - no bottles and nipples to sterilize, no measuring and mixing formula    The physical contact with breastfeeding can make babies feel secure, warm and comforted     What about formula?  While you and your baby are staying with us at [facility], we will support whatever feeding choice you make for your baby.    Some important considerations:      The American Academy of  Pediatrics, the World Health Organization, and many more organizations recommend exclusive breastfeeding for 6 months and continued breastfeeding while adding other foods for the first 1-2 years.      Any amount of breastmilk has benefits to both baby and mother.    Giving formula in replacement of breastfeeding can affect mother s milk supply.  If formula is needed, hospital staff will work with you on a plan to help develop your milk supply.    Formula alters the natural growth of good bacteria in the  stomach.     Research has found that first time mothers who offer formula in the hospital have a shorter duration of breastfeeding.    How can I start to prepare?     Start by having a conversation with your medical provider.     Talk with your partner, family and friends.     Attend a prenatal class that includes breastfeeding preparation. Birth and breastfeeding classes are offered by [employer]. Visit [employer] for class information.     After your baby s birth, hospital staff and lactation consultants will help you and your baby get off to a great start with breastfeeding.    As your center of gravity and weight changes, use good body mechanics when changing positions and lifting. For example, use a straight back and your legs for support when lifting instead of bending over. Maintain good posture to prevent straining your muscles. Now is a good time to continue or restart your exercise program. Walking 30-60 minutes daily is an excellent way to keep fit. Yoga and swimming also offer many benefits.    The nausea and fatigue of early pregnancy have usually started to let up, so this is a good time to focus on nutrition. Consider attending a nutrition class. A healthy diet includes about 60 grams of protein each day (3-4 servings of dairy, 2-3 servings of meat/fish/poultry/nuts), 4-6 servings of whole grain foods, and 5-6 servings of fruits and vegetables. Remember to drink 6-8  glasses of water daily.    Watch for warning signs, such as     vaginal bleeding    fluid leaking from your vagina    severe abdominal pain    nausea and vomiting more than 4-5 times a day, or if you are unable to keep anything down    fever more than 100.4 degrees F.       RESOURCES   You can refer to the Starting Out Right book or find it online at http://www.[facility].org/images/stories/maternity/[facility]-Starting-Out-Right.pdf or http://www.[facility].org/images/stories/flipbooks/[facility]-starting-out-right/[facility]-starting-out-right.html#p=8    You can sign up for a weekly parenting e-mail that gives support, tips and advice from health care professionals that starts with pregnancy and continues through the toddler years. To register, go to [email or web address] at any time during your pregnancy.    Breastfeeding:    OUTPATIENT LACTATION RESOURCES    [facility] and [facility]: [email or web address]       -Schedule an appointment with a [facility] CNM who is also a Lactation Consultant by calling [phone]     -Schedule an appointment with a [facility] CNM who is also a Lactation Consultant by calling [phone]. We see women for breastfeeding visits at [facility] and [facility] Tuesday - Thursday.     -Schedule an outpatient appointment with one of the [facility] Lactation Consultants at [facility], or [facility] by calling [phone]    -Attend a baby weigh in at [facility].  Lactation consultants are available to answer questions  [place]: Tuesdays 1:00 - 2:00  [facility]: Mondays 1:00 - 2:00   [email or web address].[email or web address]    -Attend one of the New Mama groups at [facility] in [facility].  [facility] also offers one-on-one in home and in office lactation consults.   [email or web address]    -Attend a LeLeche League meeting.  Multiple groups in several locations throughout  [facility] and Home. The meetings are no-cost and always informative breastfeeding education session through [employer] La Leche League  Www.lllofmndas.org/    Childbirth and Parenting Education:   [facility]: http://[facility] Hospital[facility] [employer]/   [phone] [phone]  [place]: (education, yoga & wellness) www.[employer].Ygle  Enlightened Mama: www.enlightenedmama.Ygle   Childbirth collective: (Parent topic nights)  www.childbirthcollective.org/  Hypnobabies:  www.hypnobabiestwincities.com/  Hypnobirthing:  Http://hypnobirthing.com/    Book Recommendations:   Makayla Xavi's Birthing From Within--first few chapters include a new-age tone, you may prefer to skip it and keep going, because there is good stuff later.  This book recommendation covers emotional preparation, but does cover coping with pain, and use of both pharmacological and nonpharmacological methods.    Dr. Garcia' The Pregnancy Book and The Birth Book--the pregnancy book goes month-by month    Womanly Art of Breastfeeding by La Leche League International   Bestfeeding by Lalita Gibbs--great pictures    Mothering Your Nursing Toddler, by Vidhi Enriquez.   Addresses dealing with so many of the challenging behaviors of a nursing toddler.  How Weaning Happens, by La Leche League.  Discusses weaning at all ages, from medically necessary weaning of an infant, all the way up to age 5 (or older), with why/why not, and strategies.  Very empowering book both for deciding to wean and deciding not to.    American College of Nurse-Midwives (ACNM) http://www.midwife.org/; look at the informational handouts at http://www.midwife.org/Share-With-Women     www.mymidwife.org    Mother to Baby (Medication and Herbal guidance in pregnancy): http://www.mothertobaby.org  Toll-Free Hotline: 766.981.2065  LactMed (Medication use while breastfeeding): http://toxnet.nlm.nih.gov/newtoxnet/lactmed.htm    Women's Health.gov:  http://www.womenshealth.gov/a-z-topics/index.html    [facility]  "pregnancy association - http://americanpregnancy.org    Centering Pregnancy (group prenatal care option): http://centeringhealthcare.org    Information about doulas:  Childbirth collective: http://www.childbirthcollective.org/  Doulas of [place] (CATHERINE):  www.catherine.org  [facility]  project: http://twincitiesdoulaproject.com/     Early Childhood and Family Education (ECFE):  ECFE offers parents hands-on learning experiences that will nourish a lifetime of teachable moments.  http://ecfe.info/ecfe-home/    March of Dimes www.uParts.Retrace     FDA - Nutrition  www.mypyramid.gov  Under \"For Consumers,\" click on \"pregnant and breastfeeding women.\"      Centers for Disease Control and Prevention (CDC) - Vaccines : http://www.cdc.gov/vaccines/       When researching information on the web, question the validity of websites.  The XD Nutrition .gov, Cloudmach andAxial Healthcareorg tend to be more reliable information.  If there are a lot of advertisements, be cautious of the information provided. Stay away from blogs and chat rooms please!      Nutrition & supplements:     Prenatal vitamin (those with 600-1000 mcg folic acid and 27 mg of iron are enough).  Take with food or Juice     4-5 servings of dairy or other calcium rich foods (fish, leafy greens, soy) per day - if not, take 500-1000 mg additional calcium (Tums, pills, chews). Take with dairy     Vitamin D3 5596-7195 IU geltab daily.  Take with fattiest meal.  Look for fortified foods also (Dairy, Juice)     2-3 (4) oz servings of fish, seafood, nuts (walnuts & almonds), oils, avocado per week - if not, take Omega 3 Fatty acids: DHA & WINTER 5188-3540 mg per day.  Other names: cod liver oil, fish oil. Take with fattiest meal.  Some prenatals have DHA, but typically not a sufficient dose.    Fish: Do not eat shark, swordfish, iesha mackerel, or tilefish when you are pregnant or breastfeeding.  They contain high levels of mercury.  Limit white (albacore) tuna to no more than 6 ounces " per week. Http://www.fda.gov/downloads/ForConsumers/ConsumerUpdates/OYQ672724.pdf         Touring the [facility]  To schedule a tour of [facility], call [phone]    To schedule a tour of [facility], call [phone]     You are invited to  Meet the [facility] Nurse-Midwives  A way to tour the hospital Labor and Delivery unit and meet the midwives that attend births since you may not have the opportunity to meet them during your prenatal care.  Some sessions are informal meet and greet type social hours, others address a specific concern or topic.    Tuesday, Februrary 12, 2019 7-8pm  [facility]    Wednesday, April 17, 2019 7-8pm  [facility], Sidorium A    Thursday, August 15, 2019 7-8pm  [facility]    Wednesday November 13, 2019 7-8 pm  [facility], Auditorum A    Please call [phone] to register

## 2021-06-24 NOTE — PATIENT INSTRUCTIONS - HE
Visit actually [facility] Nurse Midwives - Contact information:  Appointment line and to get a hold of CNM in clinic Monday-Friday 8 am - 5 pm:  [phone].  There are some clinics with early start times (1st appointment 7:40 am) and others with evening hours (last appointment 6:20 pm).  Most are typically open from 8 am to 5 pm.    CNM on call answering service: [phone].  Specify your hospital of choice and leave a brief message for CNM;  will then page CNM who is on call at your specified hospital and you should receive a call back with 15 minutes.  Be sure that your ringer is audible and that you can accept blocked calls so that we can get back in touch with you! This number should be reserved for urgent needs if during the day, before 8 am, after 5 pm, weekends, holidays.    Contact the on-call CNM with warning signs, such as:    vaginal bleeding     Vaginal discharge and itching or pain and burning during urination    Leg/calf pain or swelling on one side    severe abdominal pain    nausea and vomiting more than 4-5 times a day, or if you are unable to keep anything down    fever more than 100.4 degrees F.         Touring the [facility] Center  To schedule a tour of [facility], call [phone]    To schedule a tour of [facility], call [phone]         You are invited to  Meet the [facility] Nurse-Midwives  A way to tour the hospital Labor and Delivery unit and meet the midwives that attend births since you may not have the opportunity to meet them during your prenatal care.  Some sessions are informal meet and greet type social hours, others address a specific concern or topic.    Tuesday, Februrary 12, 2019 7-8pm  [facility]    Wednesday, April 17, 2019 7-8pm  [facility], Auditorium A    Thursday, August 15, 2019 7-8pm  [facility]    Wednesday November 13, 2019 7-8 pm  [facility]  [place], Auditorum A    Please call [phone] to register      Ultrasound Appointment:   Don't forget to schedule your ultrasound appointment around 20 weeks into your pregnancy. Your midwife will order the exam for you to schedule at [phone] with [facility] radiology locations or at the independent radiology clinic of your preference.      GENETIC SCREENING OPTIONS AT [facility]          All testing is optional. We don t recommend or discourage any test; it is totally up to you and your partner. Some couples wish to know their risk of having a baby with a genetic defect and others do not. We will support your decision. Abnormal results may lead to a discussion of options for further testing.    Accurate dating of your pregnancy is important for all testing so an ultrasound may be done prior to referral or testing.    No testing provides certainty; there are false positives and negatives associated with all testing, some more than others.    Most genetic testing is non-invasive (requires only a blood sample and sometimes an ultrasound or both).    It is always wise to check with your insurance carrier before proceeding.    Some testing can be done at our lab and some require a referral.    If you decide to do no testing, the 20 week ultrasound scan has some ability to detect abnormalities in the baby.    Marianna or Verifi    At 10 wk or greater, a blood sample can be drawn here at clinic or at any of our referral offices. It will provide highly accurate results with low false positive rates for trisomy 18, trisomy 21 (Down Syndrome), and trisomy 13 (> 99% trisomy detection rate at a false positive rate of <0.1%). Gender identification can also be obtained if desired.    Quad Screen (4-marker screen)    Between 15 and 21 weeks, a sample of blood can be drawn at our lab to assess your risk of having a baby with Down Syndrome, Trisomy 18 and neural tube defects. Such testing is able to detect these conditions in  80-90% of cases and the false-positive rate is approximately 5%.     Why is dental care in pregnancy important?  During pregnancy, you are more likely to have problems with your teeth or gums. If you have an infection in your teeth or gums, the chance of your baby being premature (born early) or having low birth weight may be slightly higher than if your teeth and gums are healthy  Dental care is safe during pregnancy and important for the health of you and your baby.   What should you know before you see the dentist?    Make sure your dentist knows that you are pregnant.    If medications for infection or for pain are needed, your dentist can prescribe ones that are safe for you and your baby.    Tell your dentist about any changes you have noticed since you became pregnant and about any medications r or supplements you are taking.    Routine x-rays should be avoided in pregnancy, but it may be necessary if there is a problem or an emergency.     Your body should be covered with a lead apron to protect you and your baby.    Dental work can be done safely at any point in pregnancy. If possible, it is best to delay treatments and pro- cedures until after the first trimester.    For more information on dental health in pregnancy: http://onlinelibrary.lan.com/store/10.1111/jmwh.92141/asset/qpdd60527.pdf?v=1&t=bqqx5d39&s=[email or web address]     Quickening:   Your Baby in the Second Trimester of Pregnancy  ? At the start of the second trimester, you will feel your baby's movements, which get stronger as the baby grows bigger. At the end of the fourth month, your baby weighs about five ounces. Her kidneys begin to produce urine. During visits to your health care provider, you will be able to hear your baby's heartbeat more clearly. Your baby can move and hear your voice.   ? By the end of the fifth month, you'll be able to feel light movements (called quickening) of your fetus. Your baby is sleeping  and waking at regular intervals, and is more active than before. At this point, she is about nine inches long and weighs about one-half to one pound. During the sixth month, your baby's features become clearer. Eyebrows, eyelashes, and hair are developing. She also has finger and toe prints, and may be kicking strongly.  ? By the end of the second trimester, your baby weighs as much as two pounds and is about 11 inches long.         Gestational diabetes  Gestational diabetes develops during pregnancy (gestation). Like other types of diabetes, gestational diabetes affects how your cells use sugar (glucose). Gestational diabetes causes high blood sugar that can affect your pregnancy and your baby's health.  Any pregnancy complication is concerning, but there's good news. Expectant moms can help control gestational diabetes by eating healthy foods, exercising and, if necessary, taking medication. Controlling blood sugar can prevent a difficult birth and keep you and your baby healthy.  In gestational diabetes, blood sugar usually returns to normal soon after delivery. But if you've had gestational diabetes, you're at risk for type 2 diabetes. You'll continue working with your health care team to monitor and manage your blood sugar.    Who is at risk?  This is a list of factors that increase the risk of developing gestational diabetes for women during pregnancy:        Overweight prior to pregnancy (20% or more over ideal body weight)        High risk ethnic group: , , ,         Impaired glucose tolerance or traces of glucose in the urine        Family history of diabetes        Previously giving birth to a baby over 9 lbs. or stillborn        Previous pregnancy with gestational diabetes    Prevention:  There are no guarantees when it comes to preventing gestational diabetes -- but the more healthy habits you can adopt before pregnancy, the better. If you've had gestational  diabetes, these healthy choices may also reduce your risk of having it in future pregnancies or developing type 2 diabetes down the road.  Eat healthy foods. Choose foods high in fiber and low in fat and calories. Focus on fruits, vegetables and whole grains. Strive for variety to help you achieve your goals without compromising taste or nutrition. Watch portion sizes.   Keep active. Exercising before and during pregnancy can help protect you from developing gestational diabetes. Aim for 30 minutes of moderate activity on most days of the week. Take a brisk daily walk. Ride your bike. Swim laps.  If you can't fit a single 30-minute workout into your day, several shorter sessions can do just as much good. Park in the distant lot when you run errands. Get off the bus one stop before you reach your destination. Every step you take increases your chances of staying healthy.  Lose excess pounds before pregnancy. Doctors don't recommend weight loss during pregnancy. But if you're planning to get pregnant, losing extra weight beforehand may help you have a healthier pregnancy.  Focus on permanent changes to your eating habits. Motivate yourself by remembering the long-term benefits of losing weight, such as a healthier heart, more energy and improved self-esteem.    Preventing Diabetes after Pregnancy:  It is estimated that 35-60 percent of women that have had gestational diabetes will develop type 2 diabetes in the future. It is also thought that children from these pregnancies have a greater chance of developing obesity and type 2 diabetes.    If you do have prediabetes or have risk factors for having diabetes, research shows that doing just two things can help you prevent or delay type 2 diabetes: Lose 5% to 7% of your body weight, which would be 10 to 14 pounds for a 200-pound person; and get at least 150 minutes each week of physical activity, such as brisk walking.    RESOURCES   You can refer to the Starting Out  Right book or find it online at http://www.[facility].org/images/stories/maternity/[facility]-Starting-Out-Right.pdf or http://www.[facility].org/images/stories/flipbooks/[facility]-starting-out-right/[facility]-starting-out-right.html#p=8    You can sign up for a weekly parenting e-mail that gives support, tips and advice from health care professionals that starts with pregnancy and continues through the toddler years. To register, go to www.[email or web address] at any time during your pregnancy.    Breastfeeding Information:  OUTPATIENT LACTATION RESOURCES    -Schedule a clinic appointment with a [facility] CN with dedicated clinic hours for breastfeeding assistance by calling [phone]. Breastfeeding clinic visits are at [facility] on Wednesdays, [facility] on Tuesdays and [facility] on Thursdays.     -Schedule an outpatient appointment with one of the [facility] Lactation Consultants at [facility], [facility], or [facility] by calling [phone]    -Attend a baby weigh in at [facility].  Lactation consultants are available to answer questions  [place]: Tuesdays 1:00 - 2:00  [facility], [facility]: Mondays 1:00 - 2:00   www.[employer]light [employer]ward    -Attend one of the New Mama groups at [facility] in [facility].  [facility] also offers one-on-one in home and in office lactation consults.   www."[employer]    -Attend a LeLeche League meeting.  Multiple groups in several locations throughout the [facility]. The meetings are no-cost and always informative breastfeeding education session through [employer] La Leche League  Www.lllofmndas.org/  Medication use while breastfeeding: http://toxnet.nlm.nih.gov/newtoxnet/lactmed.htm     Childbirth and Parenting Education:   [facility]: http://Flight Steward/   [phone]  [place]: (education, yoga & wellness) www.Ooshot  [facility]: www.Industry WeaponmaInSample   Childbirth collective: (Parent  topic nights)  www.childbirthcollective.org/  Hypnobabies:  www.hypnobabiestwincities.com/  Hypnobirthing:  Http://hypnobirthing.com/    Book Recommendations:   Makayla Xavi's Birthing From Within--first few chapters include a new-age tone, you may prefer to skip it and keep going, because there is good stuff later.  This book recommendation covers emotional preparation, but does cover coping with pain, and use of both pharmacological and nonpharmacological methods.    Dr. Garcia' The Pregnancy Book and The Birth Book--the pregnancy book goes month-by month    Womanly Art of Breastfeeding by La Leche League International   Bestfeeding by Lalita Gibbs--great pictures    Mothering Your Nursing Toddler, by Vidhi Enriquez.   Addresses dealing with so many of the challenging behaviors of a nursing toddler.  How Weaning Happens, by La Leche League.  Discusses weaning at all ages, from medically necessary weaning of an infant, all the way up to age 5 (or older), with why/why not, and strategies.  Very empowering book both for deciding to wean and deciding not to.    American College of Nurse-Midwives (ACNM) http://www.midwife.org/; look at the informational handouts at http://www.midwife.org/Share-With-Women     www.mymidwife.org    Mother to Baby (Medication and Herbal guidance in pregnancy): http://www.mothertobaby.org  Toll-Free Hotline: 405.940.3052  LactMed (Medication use while breastfeeding): http://toxnet.nlm.nih.gov/newtoxnet/lactmed.htm    Women's Health.gov:  http://www.womenshealth.gov/a-z-topics/index.html    American pregnancy association - http://americanpregnancy.org    Centering Pregnancy (group prenatal care option): http://centeringhealthcare.org    Information about doulas:  Childbirth collective: http://www.childbirthcollective.org/  Doulas of [place] (CATHERINE):  www.catherine.org  [facility]  project: http://twincitiesdoulaproject.com/     Early Childhood and Family Education (ECFE):  ECFE  "offers parents hands-on learning experiences that will nourish a lifetime of teachable moments.  http://ecfe.info/ecfe-home/    March of Dimes www.RunAlong.Oddslife     FDA - Nutrition  www.mypyramid.gov  Under \"For Consumers,\" click on \"pregnant and breastfeeding women.\"      Centers for Disease Control and Prevention (CDC) - Vaccines : http://www.cdc.gov/vaccines/       When researching information on the web, question the validity of websites.  The Get Smart Content .gov, .edu and.org tend to be more reliable information.  If there are a lot of advertisements, be cautious of the information provided. Stay away from blogs and chat rooms please!  "

## 2021-06-24 NOTE — PROGRESS NOTES
Tami is here with Donovan and Christiano today.  Feeling better with nausea, however still vomits almost daily.  Reviewed prenatal labs.  Will get UC today as she did not do with IOB.  She did start taking low dose aspirin last week to help prevent preE.  Donovan had a job change which they are happy about.  Fetal anatomy screen ordered for 18-20 weeks.  Thinks she is feeling occasional FM.

## 2021-06-25 NOTE — PROGRESS NOTES
Tami is here alone today.  She is feeling better in terms of nausea.  She only gets nausea and vomiting in the mornings if she has to wake up early and this does not occur every day.  Discussed eating a snack with high-protein the night before and possibly eating a snack if she wakes up during the night to see if that would help reduce her nausea in the mornings.  Did refill Zofran today.  Noticing some lower back tightness.  Does yoga and will take warm baths which is helpful.  She and her  have decided not to do any genetic testing.  She had her fetal anatomy survey today and we reviewed these results.  There are inadequate views of the heart and outflow tracts, so an limited OB ultrasound was ordered to be done in 2-3 weeks to evaluate that.  Donovan has been out of town since last week so she is excited to have him home soon.  She is feeling daily fetal movement.  Reviewed comfort measures for round ligament pain as well.

## 2021-06-25 NOTE — PATIENT INSTRUCTIONS - HE
Visit actually [facility] Nurse Midwives - Contact information:  Appointment line and to get a hold of CNM in clinic Monday-Friday 8 am - 5 pm:  [phone].  There are some clinics with early start times (1st appointment 7:40 am) and others with evening hours (last appointment 6:20 pm).  Most are typically open from 8 am to 5 pm.    CNM on call answering service: [phone].  Specify your hospital of choice and leave a brief message for CNM;  will then page CNM who is on call at your specified hospital and you should receive a call back with 15 minutes.  Be sure that your ringer is audible and that you can accept blocked calls so that we can get back in touch with you! This number should be reserved for urgent needs if during the day, before 8 am, after 5 pm, weekends, holidays.    Contact the on-call CNM with warning signs, such as:    vaginal bleeding     Vaginal discharge and itching or pain and burning during urination    Leg/calf pain or swelling on one side    severe abdominal pain    nausea and vomiting more than 4-5 times a day, or if you are unable to keep anything down    fever more than 100.4 degrees F.         Touring the Maternity Care Center  To schedule a tour of [facility], call [phone]    To schedule a tour of [facility], call [phone]         You are invited to  Meet the [facility] Nurse-Midwives  A way to tour the hospital Labor and Delivery unit and meet the midwives that attend births since you may not have the opportunity to meet them during your prenatal care.  Some sessions are informal meet and greet type social hours, others address a specific concern or topic.    Tuesday, Februrary 12, 2019 7-8pm  [facility]    Wednesday, April 17, 2019 7-8pm  [facility], Auditorium [facility]    Thursday, August 15, 2019 7-8pm  [facility]    Wednesday November 13, 2019 7-8 pm  [facility]  [place], Aud[facility]    Please call [phone] to register      Ultrasound Appointment:   Don't forget to schedule your ultrasound appointment around 20 weeks into your pregnancy. Your midwife will order the exam for you to schedule at [phone] with [facility] radiology locations or at the independent radiology clinic of your preference.      GENETIC SCREENING OPTIONS AT [facility]          All testing is optional. We don t recommend or discourage any test; it is totally up to you and your partner. Some couples wish to know their risk of having a baby with a genetic defect and others do not. We will support your decision. Abnormal results may lead to a discussion of options for further testing.    Accurate dating of your pregnancy is important for all testing so an ultrasound may be done prior to referral or testing.    No testing provides certainty; there are false positives and negatives associated with all testing, some more than others.    Most genetic testing is non-invasive (requires only a blood sample and sometimes an ultrasound or both).    It is always wise to check with your insurance carrier before proceeding.    Some testing can be done at our lab and some require a referral.    If you decide to do no testing, the 20 week ultrasound scan has some ability to detect abnormalities in the baby.    [place] or Verifi    At 10 wk or greater, a blood sample can be drawn here at clinic or at any of our referral offices. It will provide highly accurate results with low false positive rates for trisomy 18, trisomy 21 (Down Syndrome), and trisomy 13 (> 99% trisomy detection rate at a false positive rate of <0.1%). Gender identification can also be obtained if desired.    Quad Screen (4-marker screen)    Between 15 and 21 weeks, a sample of blood can be drawn at our lab to assess your risk of having a baby with Down Syndrome, Trisomy 18 and neural tube defects. Such testing is able to detect these conditions in  80-90% of cases and the false-positive rate is approximately 5%.     Why is dental care in pregnancy important?  During pregnancy, you are more likely to have problems with your teeth or gums. If you have an infection in your teeth or gums, the chance of your baby being premature (born early) or having low birth weight may be slightly higher than if your teeth and gums are healthy  Dental care is safe during pregnancy and important for the health of you and your baby.   What should you know before you see the dentist?    Make sure your dentist knows that you are pregnant.    If medications for infection or for pain are needed, your dentist can prescribe ones that are safe for you and your baby.    Tell your dentist about any changes you have noticed since you became pregnant and about any medications r or supplements you are taking.    Routine x-rays should be avoided in pregnancy, but it may be necessary if there is a problem or an emergency.     Your body should be covered with a lead apron to protect you and your baby.    Dental work can be done safely at any point in pregnancy. If possible, it is best to delay treatments and pro- cedures until after the first trimester.    For more information on dental health in pregnancy: http://onlinelibrary.lan.com/store/10.1111/jmwh.77386/asset/elcu83497.pdf?v=1&t=fwsk5a93&t=8435[email or web address]     Quickening:   Your Baby in the Second Trimester of Pregnancy  ? At the start of the second trimester, you will feel your baby's movements, which get stronger as the baby grows bigger. At the end of the fourth month, your baby weighs about five ounces. Her kidneys begin to produce urine. During visits to your health care provider, you will be able to hear your baby's heartbeat more clearly. Your baby can move and hear your voice.   ? By the end of the fifth month, you'll be able to feel light movements (called quickening) of your fetus. Your baby is sleeping  and waking at regular intervals, and is more active than before. At this point, she is about nine inches long and weighs about one-half to one pound. During the sixth month, your baby's features become clearer. Eyebrows, eyelashes, and hair are developing. She also has finger and toe prints, and may be kicking strongly.  ? By the end of the second trimester, your baby weighs as much as two pounds and is about 11 inches long.         Gestational diabetes  Gestational diabetes develops during pregnancy (gestation). Like other types of diabetes, gestational diabetes affects how your cells use sugar (glucose). Gestational diabetes causes high blood sugar that can affect your pregnancy and your baby's health.  Any pregnancy complication is concerning, but there's good news. Expectant moms can help control gestational diabetes by eating healthy foods, exercising and, if necessary, taking medication. Controlling blood sugar can prevent a difficult birth and keep you and your baby healthy.  In gestational diabetes, blood sugar usually returns to normal soon after delivery. But if you've had gestational diabetes, you're at risk for type 2 diabetes. You'll continue working with your health care team to monitor and manage your blood sugar.    Who is at risk?  This is a list of factors that increase the risk of developing gestational diabetes for women during pregnancy:        Overweight prior to pregnancy (20% or more over ideal body weight)        High risk ethnic group: , , ,         Impaired glucose tolerance or traces of glucose in the urine        Family history of diabetes        Previously giving birth to a baby over 9 lbs. or stillborn        Previous pregnancy with gestational diabetes    Prevention:  There are no guarantees when it comes to preventing gestational diabetes -- but the more healthy habits you can adopt before pregnancy, the better. If you've had gestational  diabetes, these healthy choices may also reduce your risk of having it in future pregnancies or developing type 2 diabetes down the road.  Eat healthy foods. Choose foods high in fiber and low in fat and calories. Focus on fruits, vegetables and whole grains. Strive for variety to help you achieve your goals without compromising taste or nutrition. Watch portion sizes.   Keep active. Exercising before and during pregnancy can help protect you from developing gestational diabetes. Aim for 30 minutes of moderate activity on most days of the week. Take a brisk daily walk. Ride your bike. Swim laps.  If you can't fit a single 30-minute workout into your day, several shorter sessions can do just as much good. Park in the distant lot when you run errands. Get off the bus one stop before you reach your destination. Every step you take increases your chances of staying healthy.  Lose excess pounds before pregnancy. Doctors don't recommend weight loss during pregnancy. But if you're planning to get pregnant, losing extra weight beforehand may help you have a healthier pregnancy.  Focus on permanent changes to your eating habits. Motivate yourself by remembering the long-term benefits of losing weight, such as a healthier heart, more energy and improved self-esteem.    Preventing Diabetes after Pregnancy:  It is estimated that 35-60 percent of women that have had gestational diabetes will develop type 2 diabetes in the future. It is also thought that children from these pregnancies have a greater chance of developing obesity and type 2 diabetes.    If you do have prediabetes or have risk factors for having diabetes, research shows that doing just two things can help you prevent or delay type 2 diabetes: Lose 5% to 7% of your body weight, which would be 10 to 14 pounds for a 200-pound person; and get at least 150 minutes each week of physical activity, such as brisk walking.    RESOURCES   You can refer to the Starting Out  Right book or find it online at http://www.[facility].org/images/stories/maternity/[facility]-Starting-Out-Right.pdf or http://www.[facility].org/images/stories/flipbooks/[facility]-starting-out-right/[facility]-starting-out-right.html#p=8    You can sign up for a weekly parenting e-mail that gives support, tips and advice from health care professionals that starts with pregnancy and continues through the toddler years. To register, go to [email or web address] at any time during your pregnancy.    Breastfeeding Information:  OUTPATIENT LACTATION RESOURCES    -Schedule a clinic appointment with a [facility] CN with dedicated clinic hours for breastfeeding assistance by calling [phone]. Breastfeeding clinic visits are at [facility] on Wednesdays, [facility] on Tuesdays and [facility] on Thursdays.     -Schedule an outpatient appointment with one of the [facility] Lactation Consultants at [facility], [facility], or [facility] by calling [phone]    -Attend a baby weigh in at [facility].  Lactation consultants are available to answer questions  [place]: Tuesdays 1:00 - 2:00  [facility], [facility] Mondays 1:00 - 2:00   www.Selftrade    -Attend one of the New Mama groups at [facility] in [facility]  [facility] also offers one-on-one in home and in office lactation consults.   www.Population Diagnostics    -Attend a LeLeche League meeting.  Multiple groups in several locations throughout the [facility]. The meetings are no-cost and always informative breastfeeding education session through [employer] La Leche League  Www.lllofmndas.org/  Medication use while breastfeeding: http://toxnet.nlm.nih.gov/newtoxnet/lactmed.htm     Childbirth and Parenting Education:   [facility]: http://Selftrade/   ([phone] 462-BABY  [place]: (education, yoga & wellness) www.Vignani  [facility]: www.[employer] GmbHmaRSens   Childbirth collective: (Parent  topic nights)  www.childbirthcollective.org/  Hypnobabies:  www.hypnobabiestwincities.com/  Hypnobirthing:  Http://hypnobirthing.com/    Book Recommendations:   Makayla Xavi's Birthing From Within--first few chapters include a new-age tone, you may prefer to skip it and keep going, because there is good stuff later.  This book recommendation covers emotional preparation, but does cover coping with pain, and use of both pharmacological and nonpharmacological methods.    Dr. Garcia' The Pregnancy Book and The Birth Book--the pregnancy book goes month-by month    Womanly Art of Breastfeeding by La Leche League International   Bestfeeding by Lalita Gibbs--great pictures    Mothering Your Nursing Toddler, by Vidhi Enriquez.   Addresses dealing with so many of the challenging behaviors of a nursing toddler.  How Weaning Happens, by La Leche League.  Discusses weaning at all ages, from medically necessary weaning of an infant, all the way up to age 5 (or older), with why/why not, and strategies.  Very empowering book both for deciding to wean and deciding not to.    American College of Nurse-Midwives (ACNM) http://www.midwife.org/; look at the informational handouts at http://www.midwife.org/Share-With-Women     www.mymidwife.org    Mother to Baby (Medication and Herbal guidance in pregnancy): http://www.mothertobaby.org  Toll-Free Hotline: 907.375.4895  LactMed (Medication use while breastfeeding): http://toxnet.nlm.nih.gov/newtoxnet/lactmed.htm    Women's Health.gov:  http://www.womenshealth.gov/a-z-topics/index.html    American pregnancy association - http://americanpregnancy.org    Centering Pregnancy (group prenatal care option): http://centeringhealthcare.org    Information about doulas:  Childbirth collective: http://www.childbirthcollective.org/  Doulas of [place] (CATHERINE):  www.catherine.org  [facility]  project: http://twincitiesdoulaproject.com/     Early Childhood and Family Education (ECFE):  ECFE  "offers parents hands-on learning experiences that will nourish a lifetime of teachable moments.  http://ecfe.info/ecfe-home/    March of Dimes www.[employer].Mail'Inside     FDA - Nutrition  www.mypyramid.gov  Under \"For Consumers,\" click on \"pregnant and breastfeeding women.\"      Centers for Disease Control and Prevention (CDC) - Vaccines : http://www.cdc.gov/vaccines/       When researching information on the web, question the validity of websites.  The [place] .gov, .edu and.org tend to be more reliable information.  If there are a lot of advertisements, be cautious of the information provided. Stay away from blogs and chat rooms please!  "

## 2021-06-25 NOTE — PROGRESS NOTES
Tami is here with Donovan today for her routine prenatal appt at 39 weeks gestation. History of pre eclampsia and is on daily ASA supplement, BP WDL today. Talking about cutting back at work, has off loaded much of her workload already. Discussed option of membrane sweeping with exam and reviewed risks and benefits.Unable to sweep membranes with exam today due to presenting part high in pelvis. Vertex by [place] but will obtain limited OB ultrasound to conirm position. Feeling baby move, Irreg contractions, some painful. Advised to make appt in 1 weeks. Briefly discussed post dates management. Reviewed changes to hospital policies regarding COVID-19 and the maternity unit. Reviewed how to reach CNM with non-urgent and urgent concerns between visits.

## 2021-06-26 NOTE — PATIENT INSTRUCTIONS - HE
"[facility] Nurse Midwives - Contact information:  Appointment line and to get a hold of CNM in clinic Monday-Friday 8 am - 5 pm:  [phone].  There are some clinics with early start times (1st appointment 7:40 am) and others with evening hours (last appointment 6:20 pm).  Most are typically open from 8 am to 5 pm.    CNM on call answering service: [phone].  Specify your hospital of choice and leave a brief message for CNM;  will then page CNM who is on call at your specified hospital and you should receive a call back with 15 minutes.  Be sure that your ringer is audible and that you can accept blocked calls so that we can get back in touch with you! This number should be reserved for urgent needs if during the day, before 8 am, after 5 pm, weekends, holidays.    Contact the on-call CNM with warning signs, such as:    vaginal bleeding     Vaginal discharge and itching or pain and burning during urination    Leg/calf pain or swelling on one side    severe abdominal pain    nausea and vomiting more than 4-5 times a day, or if you are unable to keep anything down    fever more than 100.4 degrees F.       Meet the Midwives from [facility]  You are invited to an informational meet and greet with [facility] Certified Nurse-Midwives. Our free \"Meet the Midwives\" event is a great opportunity to learn about our midwives' philosophy and experience, the hospitals where we can assist with your birth, and answer questions you may have. Partners, friends, and family are welcome to attend. Currently, this is a virtual event.  Date  First Tuesday of every month at 7 pm.    Link to next (live) meeting  https://[email or web address].org/classes-and-events/meet-the-midwives-from-[facility]-clinics  To Join by Telephone (audio only) Call:   [phone] Phone Conference ID: [phone]#          Touring the Maternity Care Center  At this time we are offering a virtual tour of the " Maternity Care Centers at both [facility] and [facility]:   [facility]: https://www.Leadore.org/Locations/[facility]/Maternity-Care-Center  [place]:   https://[facility] Hospital[facility]ricHQ.org/[facility]arch[facility]on [facility]-care/the-birthplace/tours  https://www.[place].org/Locations/[facility]/Maternity-Care-Center/#virtual_tour  When in person tours become available, registrations is required. To schedule a tour at either [place] or [facility], please do so online using the following links:  [facility] - [email or web address]&[email or web address]&[email or web address]&[email or web address]&[email or web address]&it=1&hlp=383  [facility] - [email or web address]://[email or web address].[email or web address]/[email or web address]?[email or web address]&[email or web address]&[email or web address]&[email or web address]&[email or web address]&[email or web address]&it=1&gsk=769    Childbirth and Parenting Education:     Free Video Series from [facility]: https://www.nursing.[facility]/laborandbirthvideos  [facility]: http://[facility] Health[facility]AM-IT.Kites/   [phone]  [place]: (education, yoga & wellness) www.Yanado  Enlightened Mama: www.University of Hawaiienedmama.Kites   Childbirth collective: (Parent topic nights)  www.childbirthcollective.org/  Hypnobabies:  www.hypnobabiestwincities.com/  Hypnobirthing:  Http://hypnobirthing.com/  The Birth Hour: https://the[employer]/online-childbirth-class/    APPS and Podcasts:   Silvia Pantoja  The Birth Hour (for birth stories)   Birthful   Expectful   The Longest Shortest Time  PregnancyPodcast Michelle Hyde    Breastfeeding Information:  An excellent 15-minute video on preparing for a good breastfeeding experience, including how to ensure you have a good milk supply and a comfortable latch, can be found here:  https://firstdroplets.com/      Book Recommendations:   Makayla Lakeland's Birthing From Within--first few chapters include a new-age tone, you may prefer to skip it and keep going, because there is good stuff later.  This book recommendation covers emotional preparation, but does cover  "coping with pain, and use of both pharmacological and nonpharmacological methods.    Dr. Garcia' The Pregnancy Book and The Birth Book--the pregnancy book goes month-by month    Womanly Art of Breastfeeding by La Leche League International   Breastfeeding by Lalita Gibbs--great pictures    Mothering Your Nursing Toddler, by Vidhi Enriquez.   Addresses dealing with so many of the challenging behaviors of a nursing toddler.  How Weaning Happens, by La Leche League.  Discusses weaning at all ages, from medically necessary weaning of an infant, all the way up to age 5 (or older), with why/why not, and strategies.  Very empowering book both for deciding to wean and deciding not to.    American College of Nurse-Midwives (ACNM) http://www.midwife.org/; look at the informational handouts at http://www.midwife.org/Share-With-Women     www.mymidwife.org    Mother to Baby (Medication and Herbal guidance in pregnancy): http://www.mothertobaby.org  Toll-Free Hotline: 199.703.7172  LactMed (Medication use while breastfeeding): http://toxnet.nlm.nih.gov/newtoxnet/lactmed.htm    Women's Health.gov:  http://www.womenshealth.gov/a-z-topics/index.html    American pregnancy association - http://americanpregnancy.org    Centering Pregnancy (group prenatal care option): http://centeringhealthcare.org    Information about doulas:  Childbirth collective: http://www.childbirthcollective.org/  Doulas of [place] (CATHERINE):  www.catherine.org  [facility]  project: http://twincitiesdoulaproject.com/     Early Childhood and Family Education (ECFE):  ECFE offers parents hands-on learning experiences that will nourish a lifetime of teachable moments.  http://ecfe.info/ecfe-home/    March of Dimes www.LoLo - Nutrition  www.mypyramid.gov  Under \"For Consumers,\" click on \"pregnant and breastfeeding women.\"      Centers for Disease Control and Prevention (CDC) - Vaccines : http://www.cdc.gov/vaccines/       When " researching information on the web, question the validity of websites.  [employer] .gov, .edu and.org tend to be more reliable information.  If there are a lot of advertisements, be cautious of the information provided. Stay away from blogs and chat rooms please!     [place] Screening Program  Http://www.health.[facility].[place].us/newbornscreening/  [place] newborns are tested soon after birth for more than 50 hidden, rare disorders, including hearing loss and critical congenital heart disease (CCHD). This site provides resources and information for families and providers.    HEALTHY PREGNANCY CARE: 37 to 41 WEEKS PREGNANT    Talk with your midwife or physician about when to call with signs of labor    Regular uterine contractions that are getting closer together and/or stronger    If you think your water has broken or is leaking    Bleeding from the vagina like a period (bloody vaginal discharge is normal)    If you are not feeling your baby move    Make plans for transportation and  as needed for when you are going to the hospital.    Your midwife or physician may offer to check your cervix for changes.     Ask your health care provider about vaccinations you may need following delivery. By now, you should have received a Tdap immunization to protect against pertussis or whooping cough. Fathers and family members who will be in close contact with the baby should also receive a Tdap shot at least two weeks before the expected birth of the baby if they have not had a Td (tetanus) shot for at least two years.    If you are past your due date, discuss the next steps leading to delivery with your midwife or physician. If you don't start labor on your own by 41 or 42 weeks, your midwife or physician may recommend giving you medicines to ripen your cervix and start labor.  Induction of labor:  http://onlinelibrary.lan.com/store/10.1016/j.jmwh.2008.04.018/asset/j.jmwh.2008.04.018.pdf?v=1&t=nwsj4muq&t=86us394x5ep12u46f0j4mp5y959410p3ta6fk117    Preparing for your baby: Tell your midwife or physician how you plan to feed your baby (breast or bottle), who you have chosen to do pediatric care for your baby, and if you have a boy, whether you have chosen to have him circumcised. You will need a car seat correctly installed in your vehicle to bring your baby home. As you start to set up the nursery at home for your baby, make sure the crib is safe. The mattress needs to fit snugly against the edges of the crib. If you can fit a soda can between the bars, they are too far apart and can allow the baby's head to caught between them.    Learn about infant care and feeding, including information about infant CPR. We recommend that you put your baby to sleep on his or her back to reduce the chance of Sudden Infant Death Syndrome (SIDS). To maintain a healthy environment in which your child can grow, it's best to keep your home smoke-free. By preparing ahead, your transition into parenthood will go smoothly for you and your baby.    Your midwife or physician will want to see you for a checkup 2 to 6 weeks after delivery.    If you have questions about any symptoms you are experiencing or any other concerns, call your provider or their clinic staff at [facility] [place] at Phone: [phone]. If it's after clinic hours, physician patients should call the Care Connection at [phone] ([phone]); midwife patients should call their answering service at [phone].    How can you care for yourself at home?   You can refer to the Starting Out Right book or find it online at http://www.healtheast.org/images/stories/maternity/[facility]-Starting-Out-Right.pdf or http://www.healtheast.org/images/stories/flipbooks/[facility]-starting-out-right/[facility]-starting-out-right.html#p=8     You can sign  up for a weekly parenting e-mail that gives support, tips and advice from health care professionals that starts with pregnancy and continues through the toddler years. To register, go to www.[facility].org/baby at any time during your pregnancy.        Making Plans for Feeding My Baby    By this point, you probably have read a lot about feeding your baby.  Breastfeed or formula? Each mother s decision is her own and [facility] respects you and your choices. We ve gathered information on both breastfeeding and formula feeding to help with your decision. Talking with your physician or nurse-midwife can also help in your decision.  However you plan to feed your baby, [facility] Maternity Care Centers encourage rooming in with your baby, skin-to-skin contact and feeding your baby based on his or her cues.    Skin-to-skin contact  Being close to mom helps your baby adjust to life outside of the womb.  It helps your baby regulate their body temperature, heart rate, and breathing.  Your baby will usually be placed skin-to-skin immediately following birth or as soon as possible, if medical intervention is needed.    Rooming-In  Having your baby stay with you in your room is called  rooming-in .  Keeping your baby in your room helps you to learn how to care for your baby by getting to know your baby s cues, body rhythms and sleep cycle.       Cue-based feeding  Cues (signals) are baby s way of telling you what he or she wants.  When you learn your infant s cues, you know how to care for and feed your baby.   Feeding cues are the licking and smacking of lips, bringing their fist to their mouth, and a reflex called  rooting - where baby turns and opens his or her mouth, searching for the breast or bottle.  Crying is a late feeding cue.  Babies can feed frequently, often at least 8 times in 24 hours.    Breastfeeding facts  Breast milk is the best source of nutrition for your baby and is available at birth. In the first couple  of days, your milk volume is already starting to increase, though it may not be noticeable. Breastfeed frequently to increase your milk supply. Within three to five days, you will begin to notice larger milk volumes. An increase in breast size, heaviness and firmness are often described as the milk  coming in.  Frequent breastfeeding can help breasts from getting overly firm and painful. You will know the baby is getting enough milk if your baby is having wet and dirty diapers and gaining weight.     If your goal is to exclusively breastfeed, it is important to not use any formula or artificial nipples (including bottles and pacifiers) while your baby is learning to breastfeed.  While it may seem like an  easy  option to give your baby a bottle, formula should only be given if there is a medical reason for your baby to have it.      Positioning and attachment   Get comfortable.  Use pillows as needed to support your arms and baby.  Hold baby close at the level of your breast, facing you in a tummy to tummy position.  Skin to skin helps with this.  Position the baby with his or her nose by the nipple.  There should be a straight line from baby s ear to shoulder to hips.  Tickle your baby s lips or wait for baby to open mouth wide, bring baby to breast by leading with the chin.  Aim the nipple at the roof of baby s mouth.  A rapid sucking pattern is followed by longer, drawing pattern with occasional swallows heard.  When baby is correctly latched, your nipple and much of the areola are pulled well into baby s mouth.      Returning to work or school  Focus on a good start to breastfeeding.  Many women continue to provide breastmilk for their baby when they return to work or school.  Making plans about where to pump and store milk can make the transition go well.  Talk with other mothers who have also returned to work or school for tips and support.  Your employer s Human Resource department may be a resource as  well.     Returning to work or school: (continued)     babies can mean fewer  sick  days for you.    A quality breast pump will also save time and add comfort.  Check with your insurance prior to giving birth for breast pump coverage.  Many insurance companies include a pump within your benefits.    Wait until your baby is at least three weeks old to introduce a bottle for the first time.  Have someone besides you give the bottle.    Breastfeed when you are with your baby. Reserve your bottles of breast milk for when you are away.     Your breasts will need to be  emptied  either by your baby or a pump.  Plan to pump at least twice in an eight hour day.    If you cannot pump at work, continue breastfeeding at home. Any amount of breast milk is worth giving to your baby.    Formula feeding facts  If you are planning to use formula to feed your baby, you will want to make some preparations ahead of time. Talk to your doctor or nurse-midwife about what type of formula to use. Some are iron-fortified, meaning they have extra iron in them. You will want to purchase formula and bottles before your baby is born to be sure you are ready after you return from the hospital. The [facility] do not provide formula samples to take home.    Be sure to follow formula mixing directions closely. Regular milk in the dairy case at the grocery store should not be given to babies under 1 year old. Baby formula is sold in several forms including:    Ready-to-use. This is the most expensive, but no mixing is necessary.    Concentrated liquid. This is less expensive than ready-to-use and you mix with water.    Powder. This is the least expensive. You mix one level scoop of powdered formula with two ounces of water and stir well.    Most babies need 2.5 ounces of formula per pound of body weight each day. This means an 8-pound baby may drink about 20 ounces of formula a day; however, this is just an estimate. The most  important thing is to pay attention to your baby s cues.  If your baby is always fussy, needs more iron or has certain food allergies, your physician may suggest you change your baby s formula to a different kind.     How do I warm my baby s bottles?  You may feed your baby a bottle without warming it first. It is OK for the breast milk or formula to be cool or room temperature. If your baby seems to prefer it warmed, you can put the filled bottle in a container of warm water and let it stand for a few minutes. Check the temperature of the liquid on your skin before feeding it to your baby; to be sure it isn t too hot. Do not heat bottles in the microwave. Microwaves heat food and liquids unevenly, and this can cause hot spots that can burn your baby.    How do I clean and sterilize bottles?  Sterilize bottles and nipples before you use them for the first time. You can do this by putting them in boiling water for 5 minutes. After that first time, you can wash them in hot and soapy water. Rinse them carefully to be sure there is no soap left on them. You can also wash them in the .    [facility] [phone] ([phone])    Baby Café    Pregnant and interested in breastfeeding?  Need answers to breastfeeding questions?  Want to help breastfeeding moms?  Already breastfeeding and want to meet other moms?    Join us at the Baby Café!    Baby Cafe is a free, drop-in service offering breast-feeding support for pregnant women, breast-feeding mothers and their families.  Come share tips and socialize with other mothers.  Babies and siblings are welcome (no childcare available).    Starting April 2018, Baby Café will be at 4 locations.  Please see below for the Baby Café closest to you!      [facility]  [address] [place] [address]  1st Wednesday: 10am-12pm    [address] [address]  3rd Wednesday 4-6pm    [facility]  [address]  "[place] [address]   10am-12:30pm    [facility]  [address] [place] [address]  : 4-6pm      Hmong, Luxembourgish, and Bangladeshi which is may be available at some sites.    For more information, please contact: Sunitha [email or web address].[email or web address]. or [phone]      Virtual Breastfeeding Support:    During this time of isolation, breastfeeding families need even more community!  Here are some area organizations offering virtual support groups for breastfeeding:      [facility] Support Group,  at 10:30 am   Run by DOYLE Pena of [employer]   Go to [employer] [facility] [employer] Facebook page and click on \"events\" for link   [email or web address]    [facility] Milk Hour,  at 2:30 pm    Run by DOYLE Jacobson   Go to [facility] FB page and send message to get link   [email or web address]://[email or web address]/[email or web address]    [facility] holding virtual meetings the first Tuesday of each month, 8-9 pm, and the   Third Saturday, 10 - 11 am.  Go to [facility] and [place] FB page; message to get link [email or web address]://[email or web address].[email or web address].Yeexoo/LLLofGoldenValley/?hc_location=[facility]    [facility] offers a Lactation Lounge every Friday 12pm - 1pm, run by Nilson [facility] Leader   Sign up via link at [employer]/cbe-lactation   [email or web address]://www.PeptiVir/cbe-lactation    [facility] is offering virtual support groups every Monday, 10:30 am - 12 pm, run by nurse IBCLC   Https://www.[email or web address]    Prenatal Breastfeeding Classes:        [facility] is offering virtual breastfeeding and  care classes:  https://www.Pepti[employer]/education-workshops    [employer]irth[employer] childbirth and breastfeeding education offering virtual prenatal breastfeeding " classes  https://www.[employer].com/workshops

## 2021-06-26 NOTE — PROGRESS NOTES
Tami is here by herself today for her routine prenatal appt at 40 weeks gestation.Feeling baby move, irreg contractions, increased mucous discharge. No bleeding or gush of fluid. Pt would like to scheduled IOL at 41 weeks, hoping to deliver before then. If unable to be induced next Tuesday will make appt for BPP and NST. Scheduled for IOL at [facility], will need cervical ripening discussed ripening options. Advised to make appt in 1 week.Reviewed changes to hospital policies regarding COVID-19 and the maternity unit. Reviewed how to reach CNM with non-urgent and urgent concerns between visits.

## 2021-07-03 NOTE — ADDENDUM NOTE
Addendum Note by Abbey Rosario RT (R) at 11/13/2017  5:34 PM     Author: Abbey Rosario RT (R) Service: -- Author Type: Radiologic Technologist    Filed: 11/13/2017  5:34 PM Encounter Date: 11/13/2017 Status: Signed    : Abbey Rosario RT (R) (Radiologic Technologist)    Addended by: ABBEY ROSARIO on: 11/13/2017 05:34 PM        Modules accepted: Orders

## 2021-07-06 VITALS
BODY MASS INDEX: 34.72 KG/M2 | SYSTOLIC BLOOD PRESSURE: 118 MMHG | HEIGHT: 66 IN | WEIGHT: 216 LBS | DIASTOLIC BLOOD PRESSURE: 70 MMHG | HEART RATE: 92 BPM

## 2021-07-06 VITALS
HEIGHT: 66 IN | WEIGHT: 215 LBS | DIASTOLIC BLOOD PRESSURE: 64 MMHG | BODY MASS INDEX: 34.55 KG/M2 | HEART RATE: 72 BPM | SYSTOLIC BLOOD PRESSURE: 116 MMHG

## 2021-07-06 VITALS
HEART RATE: 72 BPM | HEIGHT: 66 IN | BODY MASS INDEX: 34.39 KG/M2 | SYSTOLIC BLOOD PRESSURE: 112 MMHG | DIASTOLIC BLOOD PRESSURE: 64 MMHG | WEIGHT: 214 LBS

## 2021-07-14 PROBLEM — Z34.90 PREGNANT: Status: RESOLVED | Noted: 2017-11-17 | Resolved: 2017-11-22

## 2021-07-14 PROBLEM — Z34.03 ENCOUNTER FOR SUPERVISION OF NORMAL FIRST PREGNANCY IN THIRD TRIMESTER: Status: RESOLVED | Noted: 2017-07-31 | Resolved: 2017-12-20

## 2021-07-14 PROBLEM — O21.9 NAUSEA AND VOMITING IN PREGNANCY: Status: RESOLVED | Noted: 2020-11-04 | Resolved: 2021-05-24

## 2021-07-14 PROBLEM — O14.93 PRE-ECLAMPSIA IN THIRD TRIMESTER: Status: RESOLVED | Noted: 2019-08-16 | Resolved: 2019-09-25

## 2021-07-14 PROBLEM — Z34.90 PREGNANT: Status: RESOLVED | Noted: 2019-08-15 | Resolved: 2019-08-16

## 2021-07-14 PROBLEM — Z34.90 PREGNANT: Status: RESOLVED | Noted: 2021-06-12 | Resolved: 2021-06-12

## 2021-07-14 PROBLEM — O26.00 EXCESSIVE WEIGHT GAIN AFFECTING PREGNANCY: Status: RESOLVED | Noted: 2017-11-16 | Resolved: 2019-09-24

## 2021-07-14 PROBLEM — Z37.9 NORMAL LABOR: Status: RESOLVED | Noted: 2019-08-16 | Resolved: 2019-08-16

## 2021-07-14 PROBLEM — O14.03 MILD PREECLAMPSIA, THIRD TRIMESTER: Status: RESOLVED | Noted: 2017-11-15 | Resolved: 2017-12-19

## 2021-07-14 PROBLEM — Z37.9 NORMAL LABOR: Status: RESOLVED | Noted: 2021-06-12 | Resolved: 2021-06-12

## 2021-07-14 PROBLEM — O26.03 EXCESSIVE WEIGHT GAIN DURING PREGNANCY IN THIRD TRIMESTER: Status: RESOLVED | Noted: 2021-04-27 | Resolved: 2021-06-12

## 2021-07-14 PROBLEM — O14.00 MILD PREECLAMPSIA: Status: RESOLVED | Noted: 2017-11-18 | Resolved: 2017-12-19

## 2021-07-19 ENCOUNTER — MEDICAL CORRESPONDENCE (OUTPATIENT)
Dept: HEALTH INFORMATION MANAGEMENT | Facility: CLINIC | Age: 26
End: 2021-07-19

## 2021-07-27 NOTE — PROGRESS NOTES
"Midwife Postpartum 6 Week Visit    Tami Malik is a 26 year old here for a postpartum checkup. Tami is feeling good overall, she had a very enjoyable birth experience and was happy that she could have a waterbirth for her last baby. 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     Delivery date was 2021. She had a  Waterbirth of a viable boy- Elvira, weight 7 pounds 2 oz., with no complications      Since delivery, she has been breast feeding.  She has not had any signs of infection, her lochia stopped after 1, 2 weeks.  She has not had other complications.     She is voiding and having bowel movements without difficulty.  She does have some leaking of urine, she is doing Kegel exercises and is interested in pelvic floor PT.      Contraception was discussed, her  is going to have a vasectomy this week and we discussed using a back up method until he has completed all of the post op testing.   She  has not had intercourse since delivery.   She complains of no perineal discomfort.     Mood is Stable, anxiety is improving.   Patient screened for postpartum depression.   EPDS score 4, \"Never\" to question 10.     ROS:  Negative except for what is mentioned above.     Last pap: 2019 NIL    Next pap due: 2022- declined having pap collected today.   Hgb in hospital was 13.1 on 21    EXAM:/80   Pulse 76   Ht 1.664 m (5' 5.5\")   Wt 92.1 kg (203 lb)   Breastfeeding Yes   BMI 33.27 kg/m    Exam:  Constitutional: healthy, alert, no distress and smiling  Head: Normocephalic. No masses, lesions, tenderness or abnormalities  Neck: Neck supple. No adenopathy. Thyroid symmetric, normal size,  Cardiovascular: PMI normal. No lifts, heaves, or thrills. RRR. No murmurs, clicks gallops or rub  Respiratory: Good diaphragmatic excursion. Lungs clear  Breasts: self exam is  encouraged. Deferred as patient is " lactating  Gastrointestinal: Abdomen soft, non-tender. BS normal. No masses, organomegaly    Musculoskeletal: extremities normal- no gross deformities noted, gait normal and normal muscle tone  Skin: no suspicious lesions or rashes  Neurologic: Gait normal. Reflexes normal and symmetric. Sensation grossly WNL.  Psychiatric: affect normal/bright  Hematologic/Lymphatic/Immunologic: Normal cervical lymph nodes  PELVIC EXAM:  Vulva: No lesions, well healed 1 degree laceration, BUS WNL, no  tenderness  Vagina: Moist, pink, discharge normal at introitus  Uterus: Involuted to normal size, non-tender, no masses palpated externally    ASSESSMENT:   Normal postpartum recovery   S/p normal spontaneous vaginal delivery, waterbirth   S/p 1st degree laceration- well healed   Negative depression screen     PLAN:   -Pap due 1/2022, she prefers to wait and have new provider do that after her move  -Family Planning:vasectomy  -Encourage Kegels and abdominal exercise.  -Continue a multivitamin/prenatal supplement, especially if breastfeeding.  -RTC as needed   -Referral for pelvic floor PT placed      Patient was seen with student, TANYA Mchugh who was present for learning. I personally assessed, examined and made clinical decisions reflected in the documentation.    Lottie Wade CNM

## 2021-07-28 ENCOUNTER — PRENATAL OFFICE VISIT (OUTPATIENT)
Dept: MIDWIFE SERVICES | Facility: CLINIC | Age: 26
End: 2021-07-28
Payer: MEDICAID

## 2021-07-28 VITALS
DIASTOLIC BLOOD PRESSURE: 80 MMHG | SYSTOLIC BLOOD PRESSURE: 110 MMHG | WEIGHT: 203 LBS | HEART RATE: 76 BPM | HEIGHT: 66 IN | BODY MASS INDEX: 32.62 KG/M2

## 2021-07-28 DIAGNOSIS — N39.3 STRESS INCONTINENCE OF URINE: ICD-10-CM

## 2021-07-28 PROCEDURE — 99207 PR POST PARTUM EXAM: CPT | Performed by: ADVANCED PRACTICE MIDWIFE

## 2021-07-28 ASSESSMENT — MIFFLIN-ST. JEOR: SCORE: 1659.61

## 2021-07-28 NOTE — PATIENT INSTRUCTIONS
POSTPARTUM INFORMATION AND RESOURCES:    Congratulations on the birth of your baby. We have gathered together some information on staying healthy in the postpartum time including information on exercise, nutrition and mental health. We have also listed some local and national resources for lactation support and mental health support.    If you have questions or concerns and need to speak with a midwife immediately, please call the on-call midwife at [phone].    If you have a non-emergent question or would like to schedule a follow up appointment, please call the clinic midwife at [phone].    Thank you for sharing your birth experience with the [facility].  Congratulations on the birth of your baby!    ---------------------------------------------------------------------------------------------------------  EXERCISE & NUTRITION:     Getting and Staying Active: A Healthy You!   -Why should I exercise? Exercise, being physically active, is very important for all women. Being active can help you:   Lose or maintain weight   Have more energy   Sleep better   Enjoy sex more   Relieve stress and think better   Lower the chance you will have heart disease, high blood pressure, and diabetes   Strengthen your bones and muscles   Have fewer hot flashes if you are older   -How active do I have to be to get the bene?ts of exercise?  Studies show that as little as 15 minutes of moderate exercise--like fast walking or dancing--3 times a week can improve the health of your heart. Ifyou want to get the best benefits from exercise, try to increase your physical activity to at least 30 minutes, 5 times a week. If you have a serious health problem,be sure to talk with your health care provider before starting an exercise program.   Https://IXcellerate/  Http://www.Simpler/    @PelvicGuru1  @MyPelvicFloorMuscles  @The.Vagina.Whseda    Taking Care of your Health: Health Care Maintenance Screening  recommendations   In all the things women do, taking care of everything and everybody else, they sometimes forget to take care of themselves. This handout reviews the health screenings and vaccines that are recommended for women of all ages. Talk with yourhealth care provider about which tests and vaccines you need. The chart below lists the screening tests and vaccinations recommended for healthy women who do not have a high risk for most diseases.   The recommendations in thischart are guidelines only. For some tests and vaccines, the chart says you should talk to your health care provider.This is because the best recommendations for you depend on your personal health history. Your health care provider may suggest more frequent testing or additional tests ifyou havea higher chance of getting some diseases     Planning Your Family: Developing a Reproductive Life Plan   Planning ahead can help you avoid getting pregnant when you don t want to be pregnant and also be in good health if and when you do decide to become pregnant. Many women have at least one pregnancy in their lives, even if it was not planned. In fact, about half of all pregnancies are not planned. Getting pregnant when you did not plan it can be a problem, or it can turn into a happy event. Planning pregnancy leads to healthier pregnancies, healthier mothers, and healthier families.   Although many women want to have a family, not everyone wants to have children. More and more women are childless by choice (also known as childfree). Whether to have children is a personal choice that only you can make. It s okay not to want children! If you never want to get pregnant, it is important to make sure you always use very effective birth control, such as an intrauterine device, the birth control implant, female sterilization (having your tubes tied), or your partner having a vasectomy.     Reliable resources:   ?   American College of Nurse-Midwives (ACNM)  "http://www.midwife.org/; look at the informational handouts at http://www.midwife.org/Share-With-Women   ??   Women's Health.gov:   http://www.womenshealth.gov/a-z-topics/index.html   FDA - Nutrition ?www.mypyramid.gov? Under \"For Consumers,\" click on \"pregnant and breastfeeding women.\"   ?   Vaccines : Centers for Disease Control and Prevention (CDC) http://www.cdc.gov/vaccines/   ?   [facility] www.HoustonGiritech.IndiaIdeas   ?   When researching information on the web, question the validity of websites.? The Intelen .gov, Growish and.org tend to be more reliable information.? If there are a lot of advertisements, be cautious of the information provided. Stay away from blogs and chat rooms please!   ?   ?   Nutrition and supplements:   Daily multivitamin vitamin (with 400 - 1000 mcg folic acid).? Take with food as needed.   ?   4-5 servings of dairy or other calcium rich foods (fish, leafy greens, soy)?per day - if not, take 500-1000 mg additional calcium (Tums, pills, chews). Take with dairy.   ?   Vitamin D3 4000 IU geltab daily. Vitamin D rich foods: Cod Liver Oil (1Tbsp), [place], Mackerel, Tuna, fortified milk and yogurt, Beef and liver, sardines, egg, fortified cereals, swiss cheese.? Take supplements with fattiest meal.?   ?   2-3 (4) oz servings of fish, seafood, nuts (walnuts & almonds), oils, avocado per week - if not, take Omega 3 Fatty acids: DHA & WINTER 7940-3322 mg per day. ?Other names: cod liver oil, fish oil. Take with fattiest meal.   ?   ?---------------------------------------------------------------------------------------------------------  POSTPARTUM & LACTATION RESOURCES :    Virtual Breastfeeding Support:    During this time of isolation, breastfeeding families need even more community!  Here are some area organizations offering virtual support groups for breastfeeding:      [facility] Cafe Support Group, Tuesdays at 10:30 am   Run by Hope Kaufman IBMANJULA of The Baby [facility] Lactation Consultants   Go to The " "Baby Whisperer Lactation Consultants Facebook page and click on \"events\" for link   [email or web address]    [facility] Milk Hour,  at 2:30 pm    Run by Vince Shearer IBCLC   Go to [facility] FB page and send message to get link   https://www.Careland.Allocab/healthfoundations/    [facility]/[place] holding virtual meetings the first Tuesday of each month, 8-9 pm, and the   Third Saturday, 10 - 11 am.  Go to [facility] and [place] FB page; message to get link [email or web address] offers a Lactation Lounge every Friday 12pm - 1pm, run by Nilson Aden Fallon Leader   Sign up via link at [employer]/cbe-lactation   https://www.CycloMedia Technology/cbe-lactation    [facility] is offering virtual support groups every Monday, 10:30 am - 12 pm, run by nurse IBCLC   Https://[email or web address]/    Prenatal Breastfeeding Classes:        [employer] is offering virtual breastfeeding and  care classes:  https://www.CycloMedia Technology/education-workshops    BirthEd childbirth and breastfeeding education offering virtual prenatal breastfeeding classes  https://www.birthedmn.com/workshops    Breastfeeding:   OUTPATIENT LACTATION RESOURCES   -Schedule an appointment with a [facility] QUAN who is also a Lactation Consultant by calling [phone]. Lottie Delarosa IBMANJULA, CNM at [facility] on Monday, Rebeka Malloy CNM at [facility] on  and [facility] on .   [facility] Lactation Clinics located at [facility]   Call: [phone].     Inpatient support     Outpatient appointments     Telephone consultation     Breast-feeding classes available through [facility]     [facility]/WIC/[facility] health improvement partnership: "       Baby Café    Pregnant and interested in breastfeeding?  Need answers to breastfeeding questions?  Want to help breastfeeding moms?  Already breastfeeding and want to meet other moms?    Join us at the Baby Café!    Baby Cafe is a free, drop-in service offering breast-feeding support for pregnant women, breast-feeding mothers and their families.  Come share tips and socialize with other mothers.  Babies and siblings are welcome (no childcare available).    Starting April 2018, Baby Café will be at 4 locations.  Please see below for the Baby Café closest to you!      [facility]  [address] [place] [address]  1st Wednesday: 10am-12pm    [facility]  [address] [place] [address]  3rd Wednesday 4-6pm    [facility]  [address] [place] [address]  4th Wednesday 10am-12:30pm    [facility]  [address] [place] [address]  4th Wednesdays: 4-6pm      Hmong, Uruguayan, and German which is may be available at some sites.    For more information, please contact: [email or web address]. or [phone]    -[facility]:  www.[facility] HospitalarentingCommunity Memorial Hospitaler.TrekkSoft   -Attend a baby weigh in at [place]. Lactation consultants are available to answer questions   [facility]: Tuesdays 1:00 - 2:00   [facility]: Mondays 1:00 - 2:00   -Attend a New Mamma group or a Second Time Mama group at [place].     -Enlightened Mama: www.enlightenedmama.com   -Attend one of the New Mama groups at [facility] in [facility]. [facility] also offers one-on-one in home and in office lactation consults.     -Sayra: www.madina.org/   -Attend a Brandon League meeting. Multiple groups in several locations throughout the [facility]. The meetings are no-cost and always informative breastfeeding education session through [employer] La Leche League    Held at [facility] the second Thursday of each month at 7pm    - Information on  "medication use while breastfeeding: http://toxnet.nlm.nih.gov/newtoxnet/lactmed.htm     Other Online Breastfeeding Resources:     [facility].org/baby sign up for free online weekly e-mail     [facility].org/maternity     Breastfeedingmadesimple.com     Nancyli.org (La Leche League)     Normalfed.com     Womenshealth.gov/breastfeeding     Workandpump.com     Classiphix    https://ezzai - how to arabia/abcs/   Click on \"Learn More About Attachment\"    -New Parent Connection Drop-In:  In collaboration with [employer], Early Childhood Family Education (ECFE), a program of [address], offers ongoing classes for new parents in their infants.  The connection classes offer support and resources to parents during the exciting and challenging period of transition following the birth of her baby.  Parents and babies (birth to 6 months of age) may join the group at any time.  Baby's birth to 3 months meet , from 1230 to 1:30 PM  Babies 3-6 months meet , from 4 to 5 PM    -[employer] New Mama Group: www.DataSync/free-new-mama-group  -Attend Haozu.coms Plair New Mama. Groups located at three locations:  [place], [place] and [place]. Sign up online.       Additional Resources:?   -American College of Nurse-Midwives (ACNM) http://www.midwife.org/; look at the informational handouts at http://www.midwife.org/Share-With-Women  www.mymidwife.org   ?   -Women's Health.gov:   http://www.womenshealth.gov/a-z-topics/index.html   ?   -Early Childhood and Family Education (ECFE):   ECFE offers parents hands-on learning experiences that will nourish a lifetime of teachable moments.   http://ecfe.info/ecfe-home/       -----------------------------------------------------------------------------------------------------------   (Before, during and after pregnancy)   MOOD DISORDER RESOURCES :    Are you feeling sad or depressed?     Do you feel more irritable or angry with those around you? "     Are you having difficulty bonding with your baby?     Do you feel anxious or panicky?     Are you having problems with eating or sleeping?     Are you having upsetting thoughts that you can t get out of your mind?     Do you feel as if you are  out of control  or  going crazy ?     Do you feel like you never should have become a mother?     Are you worried that you might hurt your baby or yourself?    Any of these symptoms, and many more, could indicate that you have a form of  mood or anxiety disorder, such as postpartum depression. While many women experience some mild mood changes during or after the birth of a child, 15 to 20% of women experience more significant symptoms of depression or anxiety. Please know that with informed care you can prevent a worsening of these symptoms and can fully recover. There is no reason to continue to suffer.  Women of every culture, age, income level and race can develop  mood and anxiety disorders. Symptoms can appear any time during pregnancy and the first 12 months after childbirth. There are effective and well-researched treatment options to help you recover. Although the term  postpartum depression  is most often used, there are actually several forms of illness that women may experience.    Resources:    -Pregnancy and Postpartum Support [place]: www.[facility] Hospitalupportmn.org  Who We Are: We are a group of mental health &  practitioners, service organizations, and mother volunteers who provides services to those struggling with a pregnancy, loss, or postpartum mood disorder through the Helpline, professional training, our resource list and website.  What We Do: We provide support, advocacy, awareness, and training about  mental health in [place].     Community Resource List:   This is a list of  resources within our community.   http://[facility] Hospitalupportmn.org/communityresourcelist    PSYCHOTHERAPISTS/CONSULTANTS   This is a list  "of licensed mental health professionals who have advanced clinical skills in the treatment of postpartum mood and anxiety disorders (PMADs).   Http://[facility].org/mentalhealthproviderresourcelist   INTEGRATIVE MEDICINE PRACTITIONERS:   This is a list of licensed providers who practice Integrative Medicine: a form of treatment that combines alternative medicine with evidence based medicine in an effort to treat the  whole person  (the person, not just the disease).   http://[facility].org/integrativemedicineproviders    PSYCHIATRIC CARE   This is a list of licensed Psychiatrists who have advanced clinical skills in the treatment and medication management for PMADs and lactating mothers.   Http://[facility].org/psychiatryproviderresroucelist   Click on the links below to find detailed profiles and contact information of providers who have been screened and approved as having advanced training in the area of PMADs. Please note: [facility] does not endorse a specific provider.   The list is in alphabetical order by city. You can also search for providers by city or zip code using the search box. Please click on the \"Show\" box to the upper right to advance to the next page. You may also call our Helpline at [phone] if you would like for our Helpline volunteer to find a provider for you.    -Postpartum Depression Support Group:   Weekly groups at no cost through [facility], , 1:30-3:00 p.m., at [facility], [address] [facility]. [place], , 1:30-3:00 p.m., at [facility], [address]. To register for the group or get more information, call [phone].   -Postpartum Depression Support Group:   Outpatient Intensive Treatment program for women with  mood disorders [facility]     -[facility]  Resource Guide     Women s Mental Health at [place] " [facility]  This website provides a range of current information including discussion of new research findings in women s mental health and how such investigations inform day-to-day clinical practice. Despite the growing number of studies being conducted in women s health, the clinical implications of such work are frequently controversial, leaving patients with questions regarding the most appropriate path to follow. Providing these resources to patients and their doctors so that individual clinical decisions can be made in a thoughtful and collaborative fashion dovetails with the mission of our Center.    https://womenRed [facility] Behavioral Health System.org/      If you re having thoughts of harming yourself or your baby, it is vital to get support immediately. Call 911 or go to the nearest E.R.     TOLL-FREE / NATIONWIDE EMERGENCY ASSISTANCE   Immediate Emergency:  911   National Suicide Prevention Lifeline:   1-282.403.6780   Suicide Prevention Hotline:   4-067-ZYEJCBF   National Postpartum Depression Hotline:   1-800-PPD-MOMS   Postpartum Support International (PSI)   PPD Helpline: (not a 24-hour hotline)   1-595.567.6669

## 2021-09-27 NOTE — PROGRESS NOTES
Assessment:   1.  3 1/ 2 month old infant with recurrent thrush, now improving on second course of Nystatin  2. Baby gaining weight well on exclusive breastfeeding  3  Good latch and suck in office today  4.  Mother with some symptoms of breast thrush, will treat due to infant with recurrent concern  5. Mother with good milk supply    Plan:   1.  Continue with Nystatin for Elvira as ordered by Dr. Resendiz.  2.  Change to miconazole cream on your nipples;   You can buy this over the counter with products for vaginal yeast.  3.  Once yeast is resolving, run the things that go into his mouth through a hot-water boil.  Also wash your breast pads in hot water.  4.  If you find that it is not resolving, or if breast pain is getting worse, follow up with lactation and could consider an oral prescription medication (Diflucan).  5.  Given written info on managing yeast infection.      Subjective: Tami is here today because baby Elvira was been diagnosed with thrush and did a course of Nystatin while Tami used clotrimazole on her nipples.  This resolved the issue for about 3 weeks, but this week has recurred, with baby having some white areas on tongue and Tami experiencing some pain through her right breast. No nipple excoriation, itching or pain.  Baby being treated again with Nystatin, which Tami reports is already being effective, but she would like to know how to treat herself.     Hospital Course: Uncomplicated waterbirth.    Mother's Relevant Med/Surg History: noncontributory    Breast Surgery: none    Breastfeeding Goals: continued exclusive breastfeedin    Previous Breastfeeding Experience:   two previous children    Infant's name: Elvira  Infant's bday: [birth year]  Gestational age: 40w4d  Infant's birth weight: 7 # 2 oz    Mode of delivery: vaginal waterbirth  Pediatric Provider: Dr. Nancy Resendiz  Most recent weight: 12# 1.6 oz on 8/24/21      Frequency and duration of feedings: every 3-4 hours, for 20-30  min  Swallows audible per mother: yes  Numbers of feedings in 24 hours: 6-7  Number urines per day: 7  Number of stools per day and their color: 4,  Yellow liquid    Supplementation: rarely, with pumped milk  Pumping: rarely    Objective/Physical exam:   Mother: Noticed breasts grew larger and areolas darkened during pregnancy and she noticed primary engorgement when her milk came in.     Her nipples are everted, the areola is compressible, the breast is soft and full.     Sore nipples: no  EPDS: 2    Assessment of infant:   Not weighed today--growth has been good and mother unconcerned    Age today: 3 1/2 months      Baby has full flexion of arms and legs, normal tone, behavior is alert and active, respirations are normal, skin is normal, hydration is normal, jaw is normal size and alignment.     Baby thrush: thin coating of yellow-white stain on tongue;  No patches seen on cheeks or inner surfaces of lips   Jaundice: none     Feeding assessment: Baby can hold suction with tongue while at the breast.     Alignment: The baby was flex relaxed. Baby's head was aligned with its trunk. Baby did face mother. Baby was in cradle position today.     Areolar Grasp: Baby was able to open mouth wide. Baby's lips were not pursed. Baby's lips did flange outward. Tongue was visible over bottom gum. Baby had complete seal.     Areolar Compression: Baby made rhythmic motion. There were no clicking or smacking sounds. There was no severe nipple discomfort.     Audible swallowing: Baby made quiet sounds of swallowing: There was an increase in frequency after milk ejection reflex. The milk ejection reflex is normal and milk supply is normal.     /72 (BP Location: Right arm, Patient Position: Sitting, Cuff Size: Adult Regular)   Pulse 80   SpO2 97%   OB History    Para Term  AB Living   3 3 3 0 0 3   SAB TAB Ectopic Multiple Live Births   0 0 0 0 3      # Outcome Date GA Lbr Stanislaw/2nd Weight Sex Delivery Anes PTL Lv    3 Term 21 40w4d 00:30 / 00:02 3.232 kg (7 lb 2 oz) M Waterbirth None N IVAN      Name: ST DOMINGOMALECHRIS      Apgar1: 8  Apgar5: 9   2 Term 19 39w3d 03:55 / 00:05 2.835 kg (6 lb 4 oz) F Vag-Spont Nitrous N IVAN      Complications: Fetal Intolerance, Hemorrhage, Preeclampsia/Hypertension      Name: ST DOMINGOFEMALE-NONI      Apgar1: 9  Apgar5: 9   1 Term 17 38w2d 01:38 / 00:54 2.722 kg (6 lb) F Vag-Spont Nitrous, Local N IVAN      Birth Comments: IOL for preE      Complications: Hemorrhage, Preeclampsia/Hypertension      Name: POONAM MASON-NONI      Apgar1: 8  Apgar5: 9       Current Outpatient Medications:      Omega 3-6-9 Fatty Acids (OMEGA 3-6-9 COMPLEX PO), , Disp: , Rfl:      Prenatal Vit-Fe Fumarate-FA (PRENATAL MULTIVITAMIN  PLUS IRON) 27-0.8 MG TABS per tablet, Take 1 tablet by mouth daily, Disp: , Rfl:   Past Medical History:   Diagnosis Date     Abnormal Pap smear of cervix     2018     Allergic     seasonal     Chlamydia     5 years ago     Hx of preeclampsia, prior pregnancy, currently pregnant, first trimester 2017    Without severe features 2019 recommended 81mg ASA daily beginning at week 13     Hypertension     preeclampsia first pregnancy     Hypertension 2017    preE     Postpartum hemorrhage      Urinary tract infection     x1     Varicella     as child     Past Surgical History:   Procedure Laterality Date     COLPOSCOPY      2018 neg     ENT SURGERY       MOUTH SURGERY      had 4 molars removed     WISDOM TOOTH EXTRACTION      + 4 other molars     Family History   Problem Relation Age of Onset     Asthma Mother      Substance Abuse Father      No Known Problems Sister      Mental Illness Brother         anxiety     Depression Brother      No Known Problems Maternal Grandmother      Hypertension Maternal Grandfather      No Known Problems Daughter      No Known Problems Paternal Grandmother        Time spent:  Chart review/prechartin min prior to day of  service  Face-to-face visit:   14 min   Documentation:  9 min   Total time spent on day of service: 23 min    MARNIE Serna, QUAN, IBCLC

## 2021-09-28 ENCOUNTER — ALLIED HEALTH/NURSE VISIT (OUTPATIENT)
Dept: MIDWIFE SERVICES | Facility: CLINIC | Age: 26
End: 2021-09-28
Payer: MEDICAID

## 2021-09-28 VITALS — DIASTOLIC BLOOD PRESSURE: 72 MMHG | OXYGEN SATURATION: 97 % | SYSTOLIC BLOOD PRESSURE: 124 MMHG | HEART RATE: 80 BPM

## 2021-09-28 DIAGNOSIS — B37.0 THRUSH: Primary | ICD-10-CM

## 2021-09-28 PROCEDURE — 99213 OFFICE O/P EST LOW 20 MIN: CPT | Performed by: ADVANCED PRACTICE MIDWIFE

## 2021-09-28 ASSESSMENT — EDINBURGH POSTNATAL DEPRESSION SCALE (EPDS)
I HAVE BEEN SO UNHAPPY THAT I HAVE BEEN CRYING: NO, NEVER
I HAVE BEEN ABLE TO LAUGH AND SEE THE FUNNY SIDE OF THINGS: AS MUCH AS I ALWAYS COULD
I HAVE BLAMED MYSELF UNNECESSARILY WHEN THINGS WENT WRONG: NOT VERY OFTEN
I HAVE FELT SAD OR MISERABLE: NO, NOT AT ALL
I HAVE BEEN ANXIOUS OR WORRIED FOR NO GOOD REASON: HARDLY EVER
I HAVE FELT SCARED OR PANICKY FOR NO GOOD REASON: NO, NOT AT ALL
THINGS HAVE BEEN GETTING ON TOP OF ME: NO, I HAVE BEEN COPING AS WELL AS EVER
THE THOUGHT OF HARMING MYSELF HAS OCCURRED TO ME: NEVER
I HAVE BEEN SO UNHAPPY THAT I HAVE HAD DIFFICULTY SLEEPING: NOT AT ALL
I HAVE LOOKED FORWARD WITH ENJOYMENT TO THINGS: AS MUCH AS I EVER DID
TOTAL SCORE: 2

## 2021-09-28 NOTE — PATIENT INSTRUCTIONS
1.  Continue with Nystatin for Finan as ordered by Dr. Resendiz.  2.  Change to miconazole cream on your nipples;   You can buy this over the counter with products for vaginal yeast.  3.  Once yeast is resolving, run the things that go into his mouth through a hot-water boil.  Also wash your breast pads in hot water.  4.  If you find that it is not resolving, or if breast pain is getting worse, follow up with lactation and could consider an oral prescription medication (Diflucan).      ______    Breast Thrush/Yeast:   Adapted from  [facility] Protocol by Dr. Flako Rendon  ________________________________________________  Whatever the cause of sore nipples,  it is important to get the best latch possible when you have sore nipples. Even if the cause of sore nipples is Candida (yeast, thrush), improving the latch can decrease the pain. With the ?deal  latch, the baby covers more of the areola (brown or darker part of the breast) with his lower lip than the upper lip. Note also that the baby's nose does not touch the breast. Of course, it is not always easy to change the latch of the baby older than 3 or 4 months, but it is worth a try. Also see our videos showing how to latch on a baby.     Diagnosing Candida albicans (yeast)  An infection due to Candida albicans can be difficult to diagnose and mothers should not attempt to do so on their own. The pain due to Candida albicans is often confused with pain due to poor latching and/or pain due to vasospasm/Raynaud's phenomenon. Furthermore, more than one cause of sore nipples may be the source of your pain. A good practitioner will help you to differentiate between these conditions.     If Not Getting Better Add:    Gentian violet. Actually, the gentian violet can be used along with the APNO from the very first, but it should not be used alone as it is drying and often does not work when used alone. Use once a day for four to seven days. If pain is gone after four days,  stop gentian violet. If better, but not gone after four days, continue for seven days. Stop after 7 days no matter what, not because it's dangerous, but if the gentian violet hasn't helped in 7 days, it probably won't. If not better at all after four days of use, stop the gentian violet, continue with the ointment as above and seek good help. Gentian violet comes as a 1% solution in water. It also usually dissolved in 10% alcohol, as gentian violet is not soluble in pure water. This amount of alcohol is negligible, as the baby will only get a drop of gentian violet at each treatment. Apparently some pharmacists will dissolve it in glycerine instead of alcohol, if you wish. Attention [place] residents: 2% gentian violet, which seems to be the usual concentration found in the [place], should not be used. The pharmacist should dilute it to 1% for you.    And/or:   Grapefruit Seed Extract (GSE), active ingredient must be  citricidal , should be followed by, and used in conjunction with, the APNO (All Purpose Nipple Ointment). Apply diluted solution directly on the nipples. It does not need to be refrigerated. It may be covered and used until solution is finished.     Using GSE:    Mix very well 5- 10 drops in 30 ml (1 ounce) of water.   Use cotton swab to apply on both nipples and areolas after the feeding.   Let dry a few seconds, and then apply  all purpose nipple ointment .   If also using Gentian Violet, do not use GSE on that particular feed but use after all other feeds.   Use until pain is gone and then wean down slowly over the period of at least a week.   If pain is not significantly improving after two to three days, increase the concentration by 5 drops per 30 ml (ounce) of water. Can continue increasing concentration until 25 drops/ 30 ml of water   If you start to get flaking, drying, or whiteness appears on the skin, substitute pure olive oil for APNO 1-3x/day after each feeding and decrease the concentration of the  GSE drops. If the flaking does not get better, stop the GSE drops.   Laundry can be treated as well: add 15-20 drops in the rinse cycle of all wash loads   GSE may be used in conjunction with oral GSE and Probiotics    If you are not getting better and/or you have pain in the breast as well that is not responding to treatment of the nipples alone:    Add  Oral GSE: Grapefruit seed extract (not grape seed extract). The active ingredient must be  citricidal . Use tablets or capsules, 250 mg (usually 2 tablets of 125 mg each) three or four times a day orally (taken by the mother). If preferred the liquid extract can be taken orally, 10 drops in water three times per day (though this is not as effective as the pills and the taste is quite bitter). Oral GSE can be used before trying fluconazole, instead of fluconazole, or in addition to fluconazole in resistant cases.   And/or   Probiotics: Acidophilus with bifidus (with FOS--fructo-oligosaccharides--is okay). The mother should take 1-2 capsules (strength of 10 billion cells) 2-3x/day. The probiotics should be taken at least 1 hour apart from oral GSE. Baby should be treated with Probiotics 2x/day for approximately 7 days.    If Still Not Getting Better at All      Add:  Fluconazole/Diflucan:  If pain continues and it is likely the problem is Candida, or at least reasonably likely, add fluconazole 400 mg loading, then 100 mg twice daily for at least two weeks, until the mother is pain free for a week. The course of treatment with fluconazole is not two weeks. The nipple ointment should be continued and the gentian violet can be repeated. Fluconazole should not be used as a first line treatment, especially if you have sore nipples. If used, fluconazole should be added to above topical and oral treatments, not used alone. Fluconazole takes three or four days to start working, though occasionally, in some situations, it has taken 10 days to even start working. If you have  had no relief at all with 10 days of fluconazole, it is very unlikely it will work, and you should stop taking it. Other Medications: For deep breast pain, ibuprofen 400 mg every four hours may be used until definitive treatment is working (maximum daily dose is 2400 mg/day).    Written and revised (under other names) by Flako Rendon MD, FRCPC [id number]   Revised by Flako Rendon MD, FRCPC, IBCLC and Meghana Ferguson IBCLC, 2008, 2009     All of our information sheets may be copied and distributed without further permission on the condition that it is not used in ANY context that violates the   WHO International Code on the Marketing of Breastmilk Substitutes (1981)  and subsequent World Health Assembly resolutions    Yeast or thrush tips:    Boil breast pump parts, bottles, nipples and pacifiers for 5 minutes a day.    Toys that go in your baby's mouth should be washed with hot, soapy water.    You can feed your baby your milk while you are being treated for yeast and you can pump and feed your baby your milk while you are being treated for yeast. However, milk that you froze that may have had yeast could potentially re-infect you or your baby, because yeast is only deactivated with freezing, yeast is not killed by freezing.    Wash all bras, bra pads, nightgowns, etc (anything that comes in contact with your nipples) in HOT water with bleach and dry on hot in the dryer or in the sun.    Rinsing your nipples with a vinegar and water solution (1 tablespoon vinegar to 1 cup water) after every feeding is helpful. Use a fresh cotton ball for each application and mix a new solution every day.    Some women add probiotic supplements to their diet.    Some women find that reducing yeast and sugar in their diet helps.

## 2021-10-01 ENCOUNTER — E-VISIT (OUTPATIENT)
Dept: URGENT CARE | Facility: URGENT CARE | Age: 26
End: 2021-10-01
Payer: MEDICAID

## 2021-10-01 DIAGNOSIS — Z20.822 SUSPECTED COVID-19 VIRUS INFECTION: Primary | ICD-10-CM

## 2021-10-01 PROCEDURE — 99207 PR NO BILLABLE SERVICE THIS VISIT: CPT | Performed by: PHYSICIAN ASSISTANT

## 2021-10-01 NOTE — PATIENT INSTRUCTIONS
Dear Tami Malik,    Your symptoms show that you may have coronavirus (COVID-19). This illness can cause fever, cough and trouble breathing. Many people get a mild case and get better on their own. Some people can get very sick.    Will I be tested for COVID-19?  We would like to test you for Covid-19 virus. I have placed orders for this test.     To schedule: go to your Stayzilla home page and scroll down to the section that says  You have an appointment that needs to be scheduled  and click the large green button that says  Schedule Now  and follow the steps to find the next available openings.    If you are unable to complete these Stayzilla scheduling steps, please call [phone] to schedule your testing.     Return to work/school/ guidance:  Please let your workplace manager and staffing office know when your quarantine ends     We can t give you an exact date as it depends on the above. You can calculate this on your own or work with your manager/staffing office to calculate this. (For example if you were exposed on 10/4, you would have to quarantine for 14 full days. That would be through 10/18. You could return on 10/19.)      If you receive a positive COVID-19 test result, follow the guidance of the those who are giving you the results. Usually the return to work is 10 (or in some cases 20 days from symptom onset.) If you work at [facility], you must also be cleared by Employee Occupational Health and Safety to return to work.        If you receive a negative COVID-19 test result and did not have a high risk exposure to someone with a known positive COVID-19 test, you can return to work once you're free of fever for 24 hours without fever-reducing medication and your symptoms are improving or resolved.      If you receive a negative COVID-19 test and If you had a high risk exposure to someone who has tested positive for COVID-19 then you can return to work 14 days after your last contact  with the positive individual    Note: If you have ongoing exposure to the covid positive person, this quarantine period may be more than 14 days. (For example, if you are continued to be exposed to your child who tested positive and cannot isolate from them, then the quarantine of 7-14 days can't start until your child is no longer contagious. This is typically 10 days from onset of the child's symptoms. So the total duration may be 17-24 days in this case.)    Sign up for [employer].   We know it's scary to hear that you might have COVID-19. We want to track your symptoms to make sure you're okay over the next 2 weeks. Please look for an email from [employer]IP[employer]--this is a free, online program that we'll use to keep in touch. To sign up, follow the link in the email you will receive. Learn more at [email or web address]    How can I take care of myself?    Get lots of rest. Drink extra fluids (unless a doctor has told you not to)    Take Tylenol (acetaminophen) or ibuprofen for fever or pain. If you have liver or kidney problems, ask your family doctor if it's okay to take Tylenol o ibuprofen    If you have other health problems (like cancer, heart failure, an organ transplant or severe kidney disease): Call your specialty clinic if you don't feel better in the next 2 days.    Know when to call 911. Emergency warning signs include:  o Trouble breathing or shortness of breath  o Pain or pressure in the chest that doesn't go away  o Feeling confused like you haven't felt before, or not being able to wake up  o Bluish-colored lips or face    Where can I get more information?  [facility] - About COVID-19:   [email or web address]    CDC - What to Do If You're Sick:   www.cdc.gov/coronavirus/2019-ncov/about/steps-when-sick.html    October 1, 2021  RE:  Tami Malik                                                                                                                  [address] [place] [place] [address]      To whom it may concern:    I evaluated Tami Malik on October 1, 2021. Tami Malik should be excused from work/school.     They should let their workplace manager and staffing office know when their quarantine ends.    We can not give an exact date as it depends on the information below. They can calculate this on their own or work with their manager/staffing office to calculate this. (For example if they were exposed on 10/04, they would have to quarantine for 14 full days. That would be through 10/18. They could return on 10/19.)    Quarantine Guidelines:      If patient receives a positive COVID-19 test result, they should follow the guidance of those who are giving the results. Usually the return to work is 10 (or in some cases 20 days from symptom onset.) If they work at [employer], they must be cleared by Employee Occupational Health and Safety to return to work.        If patient receives a negative COVID-19 test result and did not have a high risk exposure to someone with a known positive COVID-19 test, they can return to work once they're free of fever for 24 hours without fever-reducing medication and their symptoms are improving or resolved.      If patient receives a negative COVID-19 test and if they had a high risk exposure to someone who has tested positive for COVID-19 then they can return to work 14 days after their last contact with the positive individual    Note: If there is ongoing exposure to the covid positive person, this quarantine period may be longer than 14 days. (For example, if they are continually exposed to their child, who tested positive and cannot isolate from them, then the quarantine of 7-14 days can't start until their child is no longer contagious. This is typically 10 days from onset to the child's symptoms. So the total duration may be 17-24 days in this case.)     Sincerely,  Dimitri Wiseman PA-C

## 2021-10-09 ENCOUNTER — HEALTH MAINTENANCE LETTER (OUTPATIENT)
Age: 26
End: 2021-10-09

## 2022-02-17 PROBLEM — Z34.80 SUPERVISION OF OTHER NORMAL PREGNANCY, ANTEPARTUM: Status: RESOLVED | Noted: 2019-02-20 | Resolved: 2019-09-25

## 2022-02-17 PROBLEM — Z34.83 ENCOUNTER FOR SUPERVISION OF OTHER NORMAL PREGNANCY IN THIRD TRIMESTER: Status: RESOLVED | Noted: 2020-11-20 | Resolved: 2021-07-28

## 2022-09-11 ENCOUNTER — HEALTH MAINTENANCE LETTER (OUTPATIENT)
Age: 27
End: 2022-09-11

## 2023-02-28 NOTE — PROGRESS NOTES
Impression: Presbyopia: H52.4. Plan: Educated patient about today's findings. Order refraction to determine patient's best corrected visual acuity. Recommend good lighting when reading. [email or web address]@Patient ID: Tami Malik is a 22 y.o. female.  Assessment:   Normal postpartum exam at ~6 weeks postpartum.   lactating   Hx LSIL needing repeat pap 4/2018    Plan:    1. Pap smear not done at today's visit. Due for annual Gyn exam in 4/2018   2. Desired contraception: Depo-Provera injections.  Will start today.  R/B/A discussed  3. Discussed resumption of exercise and setting a goal to return to pre-pregnancy weight in the next 6-12 months.   4.  Resumption of intercourse reviewed with possible changes in libido and vaginal lubrication while nursing.  4.  Nutrition and supplements reviewed.  Advised continuation of a prenatal or multivitamin, also Vitamin D3 5000 IU geltab daily and an omega 3 fatty acid supplement.  5. Adjustment to parenting, self care and open communication with her support system discussed. Warning signs and symptoms related to postpartum mood disorders reviewed.     Total time spent with patient 40 minutes, >50% counseling, education and coordination of care.    Subjective:     Tami Malik is a 22 y.o. female who presents for postpartum visit. She is 6 weeks postpartum following a NSVB.  I have fully reviewed the prenatal and intrapartum course. The delivery was at term gestation. Her baby girl is named Christiano and weighed 6 lbs at birth.     Postpartum course has been stable. Baby's course has been stable. Baby is feeding breast.  Lochia continues but with scant bleeding requiring changing pad 2x/day.  Bowel function is normal. Bladder function is normal. She has not resumed intercourse. Desired contraception: Depo-Provera injections. Gallup postpartum depression screening score: 4. She has not resumed regular exercise, although has done mild weights and stretches. Patient returns to work in 2 weeks.  Hemorrhoids and stooling are better with use of colace, fiber, and preparation H.    Review of Systems  General: Denies fevers, clots, excessive bleeding, vaginal  discharge, odor, irritation, UTI sxms  Eyes: Denies problem  Ears/Nose/Throat: Denies problem  Cardiovascular: Denies problem, normal BP  Respiratory:  Denies problem  Gastrointestinal:  Occ pain and bleeding with BMs, Genitourinary: Denies problem  Musculoskeletal:  Feels like pulled lower abdomen occasionally   Skin: Denies problem  Neurologic: Denies problem  Psychiatric: Denies Problem  Endocrine: Denies problem    Objective:         Physical Exam:  General Appearance: Alert, cooperative, no distress, appears stated age  Skin: Skin color, texture, turgor normal, no rashes or lesions  HEENT: grossly normal; otoscopic and opthalmic exam not performed.   Neck: Supple, symmetrical, trachea midline, no adenopathy;  thyroid: not enlarged, symmetric, no tenderness/mass/nodules  Lungs: Clear to auscultation bilaterally, respirations unlabored  Breasts: No breast masses, tenderness, asymmetry, or nipple discharge per maternal report, left seen with BF today  Heart: Regular rate and rhythm, S1 and S2 normal, no murmur, rub, or gallop  Abdomen: Soft, non-tender.  1/2 FB diastasis at umbilicus and above  Pelvic:External genitalia normal without lesions or irritation. Vagina and cervix show no lesions, inflammation, discharge or tenderness. 2nd degree vaginal laceration well approximated and well healed, one suture still visible.  Unable to remove.   No cystocele, No rectocele. Uterus fully involuted.  No adnexal mass or tenderness.  Extremities: Extremities normal without varicosities or edema       Last Pap: 4/2017. Results were: LSIL with yeast, recommend retest in 1 year  Immunization History   Administered Date(s) Administered     MMR 08/04/2005     Td, Adult, Absorbed 08/04/2005     Tdap 12/02/2015, 09/20/2017     Immunization status: up to date and documented

## 2023-10-07 ENCOUNTER — HEALTH MAINTENANCE LETTER (OUTPATIENT)
Age: 28
End: 2023-10-07